# Patient Record
Sex: MALE | HISPANIC OR LATINO | Employment: UNEMPLOYED | URBAN - METROPOLITAN AREA
[De-identification: names, ages, dates, MRNs, and addresses within clinical notes are randomized per-mention and may not be internally consistent; named-entity substitution may affect disease eponyms.]

---

## 2019-03-11 ENCOUNTER — HOSPITAL ENCOUNTER (OUTPATIENT)
Dept: LAB | Facility: MEDICAL CENTER | Age: 54
End: 2019-03-11
Attending: NURSE PRACTITIONER
Payer: COMMERCIAL

## 2019-03-11 LAB
ALBUMIN SERPL BCP-MCNC: 4.6 G/DL (ref 3.2–4.9)
ALBUMIN/GLOB SERPL: 1.5 G/DL
ALP SERPL-CCNC: 116 U/L (ref 30–99)
ALT SERPL-CCNC: 36 U/L (ref 2–50)
ANION GAP SERPL CALC-SCNC: 7 MMOL/L (ref 0–11.9)
AST SERPL-CCNC: 26 U/L (ref 12–45)
BASOPHILS # BLD AUTO: 1 % (ref 0–1.8)
BASOPHILS # BLD: 0.1 K/UL (ref 0–0.12)
BILIRUB SERPL-MCNC: 0.5 MG/DL (ref 0.1–1.5)
BUN SERPL-MCNC: 15 MG/DL (ref 8–22)
CALCIUM SERPL-MCNC: 9.6 MG/DL (ref 8.5–10.5)
CHLORIDE SERPL-SCNC: 103 MMOL/L (ref 96–112)
CHOLEST SERPL-MCNC: 273 MG/DL (ref 100–199)
CO2 SERPL-SCNC: 28 MMOL/L (ref 20–33)
CREAT SERPL-MCNC: 1.16 MG/DL (ref 0.5–1.4)
EOSINOPHIL # BLD AUTO: 0.18 K/UL (ref 0–0.51)
EOSINOPHIL NFR BLD: 1.9 % (ref 0–6.9)
ERYTHROCYTE [DISTWIDTH] IN BLOOD BY AUTOMATED COUNT: 43.2 FL (ref 35.9–50)
GLOBULIN SER CALC-MCNC: 3.1 G/DL (ref 1.9–3.5)
GLUCOSE SERPL-MCNC: 132 MG/DL (ref 65–99)
HCT VFR BLD AUTO: 52.2 % (ref 42–52)
HDLC SERPL-MCNC: 40 MG/DL
HGB BLD-MCNC: 17.6 G/DL (ref 14–18)
IMM GRANULOCYTES # BLD AUTO: 0.03 K/UL (ref 0–0.11)
IMM GRANULOCYTES NFR BLD AUTO: 0.3 % (ref 0–0.9)
LDLC SERPL CALC-MCNC: 165 MG/DL
LYMPHOCYTES # BLD AUTO: 3.33 K/UL (ref 1–4.8)
LYMPHOCYTES NFR BLD: 34.7 % (ref 22–41)
MCH RBC QN AUTO: 30.3 PG (ref 27–33)
MCHC RBC AUTO-ENTMCNC: 33.7 G/DL (ref 33.7–35.3)
MCV RBC AUTO: 89.8 FL (ref 81.4–97.8)
MONOCYTES # BLD AUTO: 0.8 K/UL (ref 0–0.85)
MONOCYTES NFR BLD AUTO: 8.3 % (ref 0–13.4)
NEUTROPHILS # BLD AUTO: 5.17 K/UL (ref 1.82–7.42)
NEUTROPHILS NFR BLD: 53.8 % (ref 44–72)
NRBC # BLD AUTO: 0 K/UL
NRBC BLD-RTO: 0 /100 WBC
PLATELET # BLD AUTO: 263 K/UL (ref 164–446)
PMV BLD AUTO: 9.7 FL (ref 9–12.9)
POTASSIUM SERPL-SCNC: 3.7 MMOL/L (ref 3.6–5.5)
PROT SERPL-MCNC: 7.7 G/DL (ref 6–8.2)
RBC # BLD AUTO: 5.81 M/UL (ref 4.7–6.1)
SODIUM SERPL-SCNC: 138 MMOL/L (ref 135–145)
TRIGL SERPL-MCNC: 338 MG/DL (ref 0–149)
WBC # BLD AUTO: 9.6 K/UL (ref 4.8–10.8)

## 2019-03-11 PROCEDURE — 85025 COMPLETE CBC W/AUTO DIFF WBC: CPT

## 2019-03-11 PROCEDURE — 80053 COMPREHEN METABOLIC PANEL: CPT

## 2019-03-11 PROCEDURE — 80061 LIPID PANEL: CPT

## 2019-03-11 PROCEDURE — 36415 COLL VENOUS BLD VENIPUNCTURE: CPT

## 2019-06-24 ENCOUNTER — HOSPITAL ENCOUNTER (OUTPATIENT)
Dept: LAB | Facility: MEDICAL CENTER | Age: 54
End: 2019-06-24
Attending: NURSE PRACTITIONER
Payer: COMMERCIAL

## 2019-06-24 LAB
CHOLEST SERPL-MCNC: 128 MG/DL (ref 100–199)
EST. AVERAGE GLUCOSE BLD GHB EST-MCNC: 131 MG/DL
HBA1C MFR BLD: 6.2 % (ref 0–5.6)
HDLC SERPL-MCNC: 43 MG/DL
LDLC SERPL CALC-MCNC: 72 MG/DL
TRIGL SERPL-MCNC: 66 MG/DL (ref 0–149)

## 2019-06-24 PROCEDURE — 80061 LIPID PANEL: CPT

## 2019-06-24 PROCEDURE — 83036 HEMOGLOBIN GLYCOSYLATED A1C: CPT

## 2019-06-24 PROCEDURE — 84080 ASSAY ALKALINE PHOSPHATASES: CPT

## 2019-06-24 PROCEDURE — 84075 ASSAY ALKALINE PHOSPHATASE: CPT

## 2019-06-24 PROCEDURE — 36415 COLL VENOUS BLD VENIPUNCTURE: CPT

## 2019-06-26 LAB
ALP BONE SERPL-CCNC: 32 U/L (ref 0–55)
ALP ISOS SERPL HS-CCNC: 0 U/L
ALP LIVER SERPL-CCNC: 147 U/L (ref 0–94)
ALP SERPL-CCNC: 179 U/L (ref 40–120)

## 2019-09-23 ENCOUNTER — HOSPITAL ENCOUNTER (OUTPATIENT)
Dept: RADIOLOGY | Facility: MEDICAL CENTER | Age: 54
End: 2019-09-23
Attending: NURSE PRACTITIONER
Payer: COMMERCIAL

## 2019-09-23 ENCOUNTER — HOSPITAL ENCOUNTER (OUTPATIENT)
Dept: LAB | Facility: MEDICAL CENTER | Age: 54
End: 2019-09-23
Payer: COMMERCIAL

## 2019-09-23 DIAGNOSIS — R74.8 ABNORMAL LEVELS OF OTHER SERUM ENZYMES: ICD-10-CM

## 2019-09-23 LAB
ALBUMIN SERPL BCP-MCNC: 4.7 G/DL (ref 3.2–4.9)
ALP SERPL-CCNC: 103 U/L (ref 30–99)
ALT SERPL-CCNC: 32 U/L (ref 2–50)
AST SERPL-CCNC: 25 U/L (ref 12–45)
BILIRUB CONJ SERPL-MCNC: <0.1 MG/DL (ref 0.1–0.5)
BILIRUB INDIRECT SERPL-MCNC: ABNORMAL MG/DL (ref 0–1)
BILIRUB SERPL-MCNC: 0.6 MG/DL (ref 0.1–1.5)
CHOLEST SERPL-MCNC: 271 MG/DL (ref 100–199)
HDLC SERPL-MCNC: 57 MG/DL
LDLC SERPL CALC-MCNC: 188 MG/DL
PROT SERPL-MCNC: 7.6 G/DL (ref 6–8.2)
TRIGL SERPL-MCNC: 128 MG/DL (ref 0–149)

## 2019-09-23 PROCEDURE — 36415 COLL VENOUS BLD VENIPUNCTURE: CPT

## 2019-09-23 PROCEDURE — 83036 HEMOGLOBIN GLYCOSYLATED A1C: CPT

## 2019-09-23 PROCEDURE — 80076 HEPATIC FUNCTION PANEL: CPT

## 2019-09-23 PROCEDURE — 80061 LIPID PANEL: CPT

## 2019-09-23 PROCEDURE — 76705 ECHO EXAM OF ABDOMEN: CPT

## 2019-09-24 LAB
EST. AVERAGE GLUCOSE BLD GHB EST-MCNC: 117 MG/DL
HBA1C MFR BLD: 5.7 % (ref 0–5.6)

## 2019-12-24 ENCOUNTER — HOSPITAL ENCOUNTER (OUTPATIENT)
Dept: LAB | Facility: MEDICAL CENTER | Age: 54
End: 2019-12-24
Attending: NURSE PRACTITIONER
Payer: COMMERCIAL

## 2019-12-24 LAB
ALBUMIN SERPL BCP-MCNC: 4.7 G/DL (ref 3.2–4.9)
ALP SERPL-CCNC: 115 U/L (ref 30–99)
ALT SERPL-CCNC: 52 U/L (ref 2–50)
AST SERPL-CCNC: 36 U/L (ref 12–45)
BILIRUB CONJ SERPL-MCNC: <0.1 MG/DL (ref 0.1–0.5)
BILIRUB INDIRECT SERPL-MCNC: ABNORMAL MG/DL (ref 0–1)
BILIRUB SERPL-MCNC: 0.5 MG/DL (ref 0.1–1.5)
CHOLEST SERPL-MCNC: 139 MG/DL (ref 100–199)
FASTING STATUS PATIENT QL REPORTED: NORMAL
GGT SERPL-CCNC: 220 U/L (ref 7–51)
HDLC SERPL-MCNC: 55 MG/DL
LDLC SERPL CALC-MCNC: 64 MG/DL
PROT SERPL-MCNC: 7.7 G/DL (ref 6–8.2)
TRIGL SERPL-MCNC: 102 MG/DL (ref 0–149)

## 2019-12-24 PROCEDURE — 82977 ASSAY OF GGT: CPT

## 2019-12-24 PROCEDURE — 36415 COLL VENOUS BLD VENIPUNCTURE: CPT

## 2019-12-24 PROCEDURE — 80061 LIPID PANEL: CPT

## 2019-12-24 PROCEDURE — 83036 HEMOGLOBIN GLYCOSYLATED A1C: CPT

## 2019-12-24 PROCEDURE — 80076 HEPATIC FUNCTION PANEL: CPT

## 2019-12-25 LAB
EST. AVERAGE GLUCOSE BLD GHB EST-MCNC: 114 MG/DL
HBA1C MFR BLD: 5.6 % (ref 0–5.6)

## 2020-12-08 ENCOUNTER — HOSPITAL ENCOUNTER (INPATIENT)
Facility: MEDICAL CENTER | Age: 55
LOS: 32 days | DRG: 871 | End: 2021-01-09
Attending: EMERGENCY MEDICINE | Admitting: HOSPITALIST
Payer: COMMERCIAL

## 2020-12-08 ENCOUNTER — APPOINTMENT (OUTPATIENT)
Dept: RADIOLOGY | Facility: MEDICAL CENTER | Age: 55
DRG: 871 | End: 2020-12-08
Attending: EMERGENCY MEDICINE
Payer: COMMERCIAL

## 2020-12-08 DIAGNOSIS — J96.01 ACUTE HYPOXEMIC RESPIRATORY FAILURE (HCC): ICD-10-CM

## 2020-12-08 DIAGNOSIS — U07.1 COVID-19: ICD-10-CM

## 2020-12-08 PROBLEM — A41.9 SEPSIS (HCC): Status: ACTIVE | Noted: 2020-12-08

## 2020-12-08 PROBLEM — E87.1 HYPONATREMIA: Status: ACTIVE | Noted: 2020-12-08

## 2020-12-08 PROBLEM — R79.89 ABNORMAL LFTS: Status: ACTIVE | Noted: 2020-12-08

## 2020-12-08 PROBLEM — J12.82 PNEUMONIA DUE TO COVID-19 VIRUS: Status: ACTIVE | Noted: 2020-12-08

## 2020-12-08 PROBLEM — E87.20 LACTIC ACIDOSIS: Status: ACTIVE | Noted: 2020-12-08

## 2020-12-08 LAB
ALBUMIN SERPL BCP-MCNC: 3.7 G/DL (ref 3.2–4.9)
ALBUMIN/GLOB SERPL: 1.1 G/DL
ALP SERPL-CCNC: 189 U/L (ref 30–99)
ALT SERPL-CCNC: 91 U/L (ref 2–50)
ANION GAP SERPL CALC-SCNC: 12 MMOL/L (ref 7–16)
ANION GAP SERPL CALC-SCNC: 22 MMOL/L (ref 7–16)
APPEARANCE UR: CLEAR
AST SERPL-CCNC: 109 U/L (ref 12–45)
BACTERIA #/AREA URNS HPF: ABNORMAL /HPF
BASOPHILS # BLD AUTO: 0.1 % (ref 0–1.8)
BASOPHILS # BLD: 0.02 K/UL (ref 0–0.12)
BILIRUB SERPL-MCNC: 1.3 MG/DL (ref 0.1–1.5)
BILIRUB UR QL STRIP.AUTO: NEGATIVE
BUN SERPL-MCNC: 11 MG/DL (ref 8–22)
BUN SERPL-MCNC: 11 MG/DL (ref 8–22)
CALCIUM SERPL-MCNC: 8.3 MG/DL (ref 8.5–10.5)
CALCIUM SERPL-MCNC: 8.5 MG/DL (ref 8.5–10.5)
CHLORIDE SERPL-SCNC: 82 MMOL/L (ref 96–112)
CHLORIDE SERPL-SCNC: 91 MMOL/L (ref 96–112)
CO2 SERPL-SCNC: 17 MMOL/L (ref 20–33)
CO2 SERPL-SCNC: 21 MMOL/L (ref 20–33)
COLOR UR: YELLOW
COVID ORDER STATUS COVID19: NORMAL
CREAT SERPL-MCNC: 0.87 MG/DL (ref 0.5–1.4)
CREAT SERPL-MCNC: 1.21 MG/DL (ref 0.5–1.4)
CREAT UR-MCNC: 44.22 MG/DL
CRP SERPL HS-MCNC: 23.15 MG/DL (ref 0–0.75)
D DIMER PPP IA.FEU-MCNC: 1.51 UG/ML (FEU) (ref 0–0.5)
D DIMER PPP IA.FEU-MCNC: 1.91 UG/ML (FEU) (ref 0–0.5)
EKG IMPRESSION: NORMAL
EOSINOPHIL # BLD AUTO: 0 K/UL (ref 0–0.51)
EOSINOPHIL NFR BLD: 0 % (ref 0–6.9)
EPI CELLS #/AREA URNS HPF: NEGATIVE /HPF
ERYTHROCYTE [DISTWIDTH] IN BLOOD BY AUTOMATED COUNT: 40.3 FL (ref 35.9–50)
FLUAV RNA SPEC QL NAA+PROBE: NEGATIVE
FLUBV RNA SPEC QL NAA+PROBE: NEGATIVE
GLOBULIN SER CALC-MCNC: 3.4 G/DL (ref 1.9–3.5)
GLUCOSE SERPL-MCNC: 184 MG/DL (ref 65–99)
GLUCOSE SERPL-MCNC: 202 MG/DL (ref 65–99)
GLUCOSE UR STRIP.AUTO-MCNC: NEGATIVE MG/DL
HCT VFR BLD AUTO: 39.1 % (ref 42–52)
HGB BLD-MCNC: 13.5 G/DL (ref 14–18)
IMM GRANULOCYTES # BLD AUTO: 0.25 K/UL (ref 0–0.11)
IMM GRANULOCYTES NFR BLD AUTO: 1.1 % (ref 0–0.9)
INR PPP: 0.94 (ref 0.87–1.13)
KETONES UR STRIP.AUTO-MCNC: NEGATIVE MG/DL
LACTATE BLD-SCNC: 1.3 MMOL/L (ref 0.5–2)
LACTATE BLD-SCNC: 1.6 MMOL/L (ref 0.5–2)
LACTATE BLD-SCNC: 8.5 MMOL/L (ref 0.5–2)
LEUKOCYTE ESTERASE UR QL STRIP.AUTO: NEGATIVE
LYMPHOCYTES # BLD AUTO: 1.59 K/UL (ref 1–4.8)
LYMPHOCYTES NFR BLD: 7.1 % (ref 22–41)
MCH RBC QN AUTO: 29.8 PG (ref 27–33)
MCHC RBC AUTO-ENTMCNC: 34.5 G/DL (ref 33.7–35.3)
MCV RBC AUTO: 86.3 FL (ref 81.4–97.8)
MICRO URNS: ABNORMAL
MONOCYTES # BLD AUTO: 1.16 K/UL (ref 0–0.85)
MONOCYTES NFR BLD AUTO: 5.2 % (ref 0–13.4)
NEUTROPHILS # BLD AUTO: 19.22 K/UL (ref 1.82–7.42)
NEUTROPHILS NFR BLD: 86.5 % (ref 44–72)
NITRITE UR QL STRIP.AUTO: NEGATIVE
NRBC # BLD AUTO: 0 K/UL
NRBC BLD-RTO: 0 /100 WBC
OSMOLALITY SERPL: 266 MOSM/KG H2O (ref 278–298)
OSMOLALITY UR: 173 MOSM/KG H2O (ref 300–900)
PH UR STRIP.AUTO: 6 [PH] (ref 5–8)
PLATELET # BLD AUTO: 278 K/UL (ref 164–446)
PMV BLD AUTO: 9.2 FL (ref 9–12.9)
POTASSIUM SERPL-SCNC: 4.1 MMOL/L (ref 3.6–5.5)
POTASSIUM SERPL-SCNC: 4.2 MMOL/L (ref 3.6–5.5)
PROCALCITONIN SERPL-MCNC: 2.12 NG/ML
PROT SERPL-MCNC: 7.1 G/DL (ref 6–8.2)
PROT UR QL STRIP: NEGATIVE MG/DL
PROTHROMBIN TIME: 12.8 SEC (ref 12–14.6)
RBC # BLD AUTO: 4.53 M/UL (ref 4.7–6.1)
RBC # URNS HPF: ABNORMAL /HPF
RBC UR QL AUTO: ABNORMAL
RSV RNA SPEC QL NAA+PROBE: NEGATIVE
SARS-COV-2 RNA RESP QL NAA+PROBE: DETECTED
SODIUM SERPL-SCNC: 121 MMOL/L (ref 135–145)
SODIUM SERPL-SCNC: 124 MMOL/L (ref 135–145)
SODIUM UR-SCNC: <20 MMOL/L
SP GR UR STRIP.AUTO: 1.02
SPECIMEN SOURCE: ABNORMAL
UROBILINOGEN UR STRIP.AUTO-MCNC: 0.2 MG/DL
WBC # BLD AUTO: 22.2 K/UL (ref 4.8–10.8)
WBC #/AREA URNS HPF: ABNORMAL /HPF

## 2020-12-08 PROCEDURE — 80053 COMPREHEN METABOLIC PANEL: CPT

## 2020-12-08 PROCEDURE — 84300 ASSAY OF URINE SODIUM: CPT

## 2020-12-08 PROCEDURE — 71045 X-RAY EXAM CHEST 1 VIEW: CPT

## 2020-12-08 PROCEDURE — 94640 AIRWAY INHALATION TREATMENT: CPT

## 2020-12-08 PROCEDURE — 85610 PROTHROMBIN TIME: CPT

## 2020-12-08 PROCEDURE — 82570 ASSAY OF URINE CREATININE: CPT

## 2020-12-08 PROCEDURE — 83520 IMMUNOASSAY QUANT NOS NONAB: CPT

## 2020-12-08 PROCEDURE — 36415 COLL VENOUS BLD VENIPUNCTURE: CPT

## 2020-12-08 PROCEDURE — 0241U HCHG SARS-COV-2 COVID-19 NFCT DS RESP RNA 4 TRGT MIC: CPT

## 2020-12-08 PROCEDURE — 99223 1ST HOSP IP/OBS HIGH 75: CPT | Performed by: HOSPITALIST

## 2020-12-08 PROCEDURE — 81001 URINALYSIS AUTO W/SCOPE: CPT

## 2020-12-08 PROCEDURE — C9803 HOPD COVID-19 SPEC COLLECT: HCPCS | Performed by: EMERGENCY MEDICINE

## 2020-12-08 PROCEDURE — 84145 PROCALCITONIN (PCT): CPT

## 2020-12-08 PROCEDURE — 87040 BLOOD CULTURE FOR BACTERIA: CPT

## 2020-12-08 PROCEDURE — 99285 EMERGENCY DEPT VISIT HI MDM: CPT

## 2020-12-08 PROCEDURE — 96367 TX/PROPH/DG ADDL SEQ IV INF: CPT

## 2020-12-08 PROCEDURE — 85379 FIBRIN DEGRADATION QUANT: CPT

## 2020-12-08 PROCEDURE — 83605 ASSAY OF LACTIC ACID: CPT

## 2020-12-08 PROCEDURE — 93005 ELECTROCARDIOGRAM TRACING: CPT | Performed by: EMERGENCY MEDICINE

## 2020-12-08 PROCEDURE — 96375 TX/PRO/DX INJ NEW DRUG ADDON: CPT

## 2020-12-08 PROCEDURE — 96365 THER/PROPH/DIAG IV INF INIT: CPT

## 2020-12-08 PROCEDURE — 770020 HCHG ROOM/CARE - TELE (206)

## 2020-12-08 PROCEDURE — 80048 BASIC METABOLIC PNL TOTAL CA: CPT

## 2020-12-08 PROCEDURE — 700105 HCHG RX REV CODE 258: Performed by: EMERGENCY MEDICINE

## 2020-12-08 PROCEDURE — 700105 HCHG RX REV CODE 258: Performed by: HOSPITALIST

## 2020-12-08 PROCEDURE — 85025 COMPLETE CBC W/AUTO DIFF WBC: CPT

## 2020-12-08 PROCEDURE — 700111 HCHG RX REV CODE 636 W/ 250 OVERRIDE (IP): Performed by: EMERGENCY MEDICINE

## 2020-12-08 PROCEDURE — 83935 ASSAY OF URINE OSMOLALITY: CPT

## 2020-12-08 PROCEDURE — 83930 ASSAY OF BLOOD OSMOLALITY: CPT

## 2020-12-08 PROCEDURE — 86140 C-REACTIVE PROTEIN: CPT

## 2020-12-08 RX ORDER — DEXAMETHASONE 4 MG/1
6 TABLET ORAL DAILY
Status: DISCONTINUED | OUTPATIENT
Start: 2020-12-08 | End: 2020-12-08

## 2020-12-08 RX ORDER — DULOXETIN HYDROCHLORIDE 30 MG/1
30 CAPSULE, DELAYED RELEASE ORAL DAILY
Status: DISCONTINUED | OUTPATIENT
Start: 2020-12-09 | End: 2020-12-08

## 2020-12-08 RX ORDER — ALPRAZOLAM 0.25 MG/1
0.25 TABLET ORAL NIGHTLY PRN
Status: DISCONTINUED | OUTPATIENT
Start: 2020-12-08 | End: 2020-12-09

## 2020-12-08 RX ORDER — GABAPENTIN 100 MG/1
100 CAPSULE ORAL 3 TIMES DAILY
Status: DISCONTINUED | OUTPATIENT
Start: 2020-12-08 | End: 2020-12-08

## 2020-12-08 RX ORDER — SODIUM CHLORIDE 9 MG/ML
500 INJECTION, SOLUTION INTRAVENOUS ONCE
Status: COMPLETED | OUTPATIENT
Start: 2020-12-08 | End: 2020-12-08

## 2020-12-08 RX ORDER — TRAMADOL HYDROCHLORIDE 50 MG/1
50-100 TABLET ORAL EVERY 4 HOURS PRN
Status: DISCONTINUED | OUTPATIENT
Start: 2020-12-08 | End: 2020-12-10

## 2020-12-08 RX ORDER — ONDANSETRON 2 MG/ML
4 INJECTION INTRAMUSCULAR; INTRAVENOUS EVERY 6 HOURS PRN
Status: DISCONTINUED | OUTPATIENT
Start: 2020-12-08 | End: 2021-01-07

## 2020-12-08 RX ORDER — SODIUM CHLORIDE 9 MG/ML
1000 INJECTION, SOLUTION INTRAVENOUS ONCE
Status: DISCONTINUED | OUTPATIENT
Start: 2020-12-08 | End: 2020-12-08

## 2020-12-08 RX ORDER — DEXAMETHASONE 6 MG/1
6 TABLET ORAL DAILY
Status: COMPLETED | OUTPATIENT
Start: 2020-12-09 | End: 2020-12-17

## 2020-12-08 RX ORDER — AZITHROMYCIN 500 MG/5ML
500 INJECTION, POWDER, LYOPHILIZED, FOR SOLUTION INTRAVENOUS EVERY 24 HOURS
Status: DISCONTINUED | OUTPATIENT
Start: 2020-12-09 | End: 2020-12-08

## 2020-12-08 RX ORDER — ONDANSETRON 4 MG/1
4 TABLET, ORALLY DISINTEGRATING ORAL EVERY 6 HOURS PRN
Status: DISCONTINUED | OUTPATIENT
Start: 2020-12-08 | End: 2021-01-07

## 2020-12-08 RX ORDER — SODIUM CHLORIDE 9 MG/ML
INJECTION, SOLUTION INTRAVENOUS CONTINUOUS
Status: DISCONTINUED | OUTPATIENT
Start: 2020-12-08 | End: 2020-12-09

## 2020-12-08 RX ORDER — DEXAMETHASONE SODIUM PHOSPHATE 4 MG/ML
6 INJECTION, SOLUTION INTRA-ARTICULAR; INTRALESIONAL; INTRAMUSCULAR; INTRAVENOUS; SOFT TISSUE ONCE
Status: COMPLETED | OUTPATIENT
Start: 2020-12-08 | End: 2020-12-08

## 2020-12-08 RX ORDER — AZITHROMYCIN 250 MG/1
500 TABLET, FILM COATED ORAL DAILY
Status: COMPLETED | OUTPATIENT
Start: 2020-12-09 | End: 2020-12-10

## 2020-12-08 RX ORDER — ACETAMINOPHEN 325 MG/1
650 TABLET ORAL EVERY 6 HOURS PRN
Status: DISCONTINUED | OUTPATIENT
Start: 2020-12-08 | End: 2021-01-09 | Stop reason: HOSPADM

## 2020-12-08 RX ORDER — AZITHROMYCIN 500 MG/1
500 INJECTION, POWDER, LYOPHILIZED, FOR SOLUTION INTRAVENOUS ONCE
Status: COMPLETED | OUTPATIENT
Start: 2020-12-08 | End: 2020-12-08

## 2020-12-08 RX ADMIN — AZITHROMYCIN MONOHYDRATE 500 MG: 500 INJECTION, POWDER, LYOPHILIZED, FOR SOLUTION INTRAVENOUS at 16:51

## 2020-12-08 RX ADMIN — SODIUM CHLORIDE 3 G: 900 INJECTION INTRAVENOUS at 16:47

## 2020-12-08 RX ADMIN — DEXAMETHASONE SODIUM PHOSPHATE 6 MG: 4 INJECTION, SOLUTION INTRAMUSCULAR; INTRAVENOUS at 16:47

## 2020-12-08 RX ADMIN — SODIUM CHLORIDE: 9 INJECTION, SOLUTION INTRAVENOUS at 21:21

## 2020-12-08 RX ADMIN — SODIUM CHLORIDE 500 ML: 9 INJECTION, SOLUTION INTRAVENOUS at 17:03

## 2020-12-08 ASSESSMENT — COGNITIVE AND FUNCTIONAL STATUS - GENERAL
DRESSING REGULAR UPPER BODY CLOTHING: A LITTLE
WALKING IN HOSPITAL ROOM: A LITTLE
SUGGESTED CMS G CODE MODIFIER DAILY ACTIVITY: CJ
HELP NEEDED FOR BATHING: A LITTLE
DRESSING REGULAR LOWER BODY CLOTHING: A LITTLE
DAILY ACTIVITIY SCORE: 21
TURNING FROM BACK TO SIDE WHILE IN FLAT BAD: A LITTLE
MOVING TO AND FROM BED TO CHAIR: A LITTLE
MOBILITY SCORE: 18
MOVING FROM LYING ON BACK TO SITTING ON SIDE OF FLAT BED: A LITTLE
STANDING UP FROM CHAIR USING ARMS: A LITTLE
CLIMB 3 TO 5 STEPS WITH RAILING: A LITTLE
SUGGESTED CMS G CODE MODIFIER MOBILITY: CK

## 2020-12-08 ASSESSMENT — ENCOUNTER SYMPTOMS
FOCAL WEAKNESS: 0
VOMITING: 1
CHILLS: 1
FEVER: 1
DOUBLE VISION: 0
SHORTNESS OF BREATH: 1
HALLUCINATIONS: 0
DIZZINESS: 0
MYALGIAS: 1
SPEECH CHANGE: 0
SENSORY CHANGE: 0
PALPITATIONS: 0
HEMOPTYSIS: 0
ABDOMINAL PAIN: 0
DIARRHEA: 1
FLANK PAIN: 0
WEAKNESS: 0
COUGH: 1
EYE DISCHARGE: 0
NAUSEA: 0
BRUISES/BLEEDS EASILY: 0
DEPRESSION: 0
HEARTBURN: 0
BLURRED VISION: 0

## 2020-12-08 ASSESSMENT — PATIENT HEALTH QUESTIONNAIRE - PHQ9
2. FEELING DOWN, DEPRESSED, IRRITABLE, OR HOPELESS: NOT AT ALL
1. LITTLE INTEREST OR PLEASURE IN DOING THINGS: NOT AT ALL
SUM OF ALL RESPONSES TO PHQ9 QUESTIONS 1 AND 2: 0

## 2020-12-08 ASSESSMENT — FIBROSIS 4 INDEX
FIB4 SCORE: 1.01
FIB4 SCORE: 2.18

## 2020-12-08 ASSESSMENT — LIFESTYLE VARIABLES: SUBSTANCE_ABUSE: 0

## 2020-12-08 ASSESSMENT — PAIN DESCRIPTION - PAIN TYPE: TYPE: ACUTE PAIN

## 2020-12-08 NOTE — ED NOTES
Lab called with critical result of Lactic 0f 8.5 at 1555. Critical lab result read back to Lab.   Dr. Gonzalez notified of critical lab result at 1556.  Critical lab result read back by Dr. Gonzalez.

## 2020-12-08 NOTE — ED TRIAGE NOTES
Chief Complaint   Patient presents with   • Shortness of Breath     pt has diarrhea, vomiting, sob, cough the last two weeks.                       Pt is on 15L non rebreather, 85% now, was a walk into triage on 50% RA. Unable to communicate well, with non rebreather, anxious. Urdu speaking only. Coughing frequently.

## 2020-12-09 PROBLEM — E11.9 TYPE 2 DIABETES MELLITUS WITHOUT COMPLICATION (HCC): Status: ACTIVE | Noted: 2020-12-09

## 2020-12-09 LAB
ALBUMIN SERPL BCP-MCNC: 3.2 G/DL (ref 3.2–4.9)
ALBUMIN SERPL BCP-MCNC: 3.3 G/DL (ref 3.2–4.9)
ALBUMIN/GLOB SERPL: 1 G/DL
ALP SERPL-CCNC: 162 U/L (ref 30–99)
ALP SERPL-CCNC: 167 U/L (ref 30–99)
ALT SERPL-CCNC: 72 U/L (ref 2–50)
ALT SERPL-CCNC: 74 U/L (ref 2–50)
ANION GAP SERPL CALC-SCNC: 11 MMOL/L (ref 7–16)
ANION GAP SERPL CALC-SCNC: 8 MMOL/L (ref 7–16)
AST SERPL-CCNC: 64 U/L (ref 12–45)
AST SERPL-CCNC: 77 U/L (ref 12–45)
BASOPHILS # BLD AUTO: 0.1 % (ref 0–1.8)
BASOPHILS # BLD: 0.02 K/UL (ref 0–0.12)
BILIRUB CONJ SERPL-MCNC: 0.3 MG/DL (ref 0.1–0.5)
BILIRUB INDIRECT SERPL-MCNC: 0.3 MG/DL (ref 0–1)
BILIRUB SERPL-MCNC: 0.6 MG/DL (ref 0.1–1.5)
BILIRUB SERPL-MCNC: 0.7 MG/DL (ref 0.1–1.5)
BUN SERPL-MCNC: 12 MG/DL (ref 8–22)
BUN SERPL-MCNC: 13 MG/DL (ref 8–22)
CALCIUM SERPL-MCNC: 8.5 MG/DL (ref 8.5–10.5)
CALCIUM SERPL-MCNC: 8.5 MG/DL (ref 8.5–10.5)
CHLORIDE SERPL-SCNC: 94 MMOL/L (ref 96–112)
CHLORIDE SERPL-SCNC: 97 MMOL/L (ref 96–112)
CO2 SERPL-SCNC: 24 MMOL/L (ref 20–33)
CO2 SERPL-SCNC: 25 MMOL/L (ref 20–33)
CREAT SERPL-MCNC: 0.84 MG/DL (ref 0.5–1.4)
CREAT SERPL-MCNC: 0.86 MG/DL (ref 0.5–1.4)
EOSINOPHIL # BLD AUTO: 0 K/UL (ref 0–0.51)
EOSINOPHIL NFR BLD: 0 % (ref 0–6.9)
ERYTHROCYTE [DISTWIDTH] IN BLOOD BY AUTOMATED COUNT: 40.5 FL (ref 35.9–50)
EST. AVERAGE GLUCOSE BLD GHB EST-MCNC: 163 MG/DL
GLOBULIN SER CALC-MCNC: 3.1 G/DL (ref 1.9–3.5)
GLUCOSE BLD-MCNC: 184 MG/DL (ref 65–99)
GLUCOSE BLD-MCNC: 278 MG/DL (ref 65–99)
GLUCOSE SERPL-MCNC: 221 MG/DL (ref 65–99)
GLUCOSE SERPL-MCNC: 221 MG/DL (ref 65–99)
HBA1C MFR BLD: 7.3 % (ref 0–5.6)
HCT VFR BLD AUTO: 37.6 % (ref 42–52)
HGB BLD-MCNC: 13 G/DL (ref 14–18)
IMM GRANULOCYTES # BLD AUTO: 0.1 K/UL (ref 0–0.11)
IMM GRANULOCYTES NFR BLD AUTO: 0.7 % (ref 0–0.9)
LACTATE BLD-SCNC: 1.4 MMOL/L (ref 0.5–2)
LACTATE BLD-SCNC: 1.7 MMOL/L (ref 0.5–2)
LACTATE BLD-SCNC: 1.7 MMOL/L (ref 0.5–2)
LACTATE BLD-SCNC: 1.8 MMOL/L (ref 0.5–2)
LACTATE BLD-SCNC: 2.1 MMOL/L (ref 0.5–2)
LACTATE BLD-SCNC: 3.4 MMOL/L (ref 0.5–2)
LYMPHOCYTES # BLD AUTO: 0.7 K/UL (ref 1–4.8)
LYMPHOCYTES NFR BLD: 4.6 % (ref 22–41)
MCH RBC QN AUTO: 29.6 PG (ref 27–33)
MCHC RBC AUTO-ENTMCNC: 34.6 G/DL (ref 33.7–35.3)
MCV RBC AUTO: 85.6 FL (ref 81.4–97.8)
MONOCYTES # BLD AUTO: 0.77 K/UL (ref 0–0.85)
MONOCYTES NFR BLD AUTO: 5.1 % (ref 0–13.4)
NEUTROPHILS # BLD AUTO: 13.63 K/UL (ref 1.82–7.42)
NEUTROPHILS NFR BLD: 89.5 % (ref 44–72)
NRBC # BLD AUTO: 0 K/UL
NRBC BLD-RTO: 0 /100 WBC
PLATELET # BLD AUTO: 261 K/UL (ref 164–446)
PMV BLD AUTO: 9.2 FL (ref 9–12.9)
POTASSIUM SERPL-SCNC: 3.8 MMOL/L (ref 3.6–5.5)
POTASSIUM SERPL-SCNC: 4.1 MMOL/L (ref 3.6–5.5)
PROT SERPL-MCNC: 6.3 G/DL (ref 6–8.2)
PROT SERPL-MCNC: 6.6 G/DL (ref 6–8.2)
RBC # BLD AUTO: 4.39 M/UL (ref 4.7–6.1)
SODIUM SERPL-SCNC: 129 MMOL/L (ref 135–145)
SODIUM SERPL-SCNC: 130 MMOL/L (ref 135–145)
WBC # BLD AUTO: 15.2 K/UL (ref 4.8–10.8)

## 2020-12-09 PROCEDURE — 700102 HCHG RX REV CODE 250 W/ 637 OVERRIDE(OP): Performed by: HOSPITALIST

## 2020-12-09 PROCEDURE — 80048 BASIC METABOLIC PNL TOTAL CA: CPT

## 2020-12-09 PROCEDURE — 94640 AIRWAY INHALATION TREATMENT: CPT

## 2020-12-09 PROCEDURE — 700105 HCHG RX REV CODE 258: Performed by: HOSPITALIST

## 2020-12-09 PROCEDURE — 82962 GLUCOSE BLOOD TEST: CPT

## 2020-12-09 PROCEDURE — XW033E5 INTRODUCTION OF REMDESIVIR ANTI-INFECTIVE INTO PERIPHERAL VEIN, PERCUTANEOUS APPROACH, NEW TECHNOLOGY GROUP 5: ICD-10-PCS | Performed by: INTERNAL MEDICINE

## 2020-12-09 PROCEDURE — A9270 NON-COVERED ITEM OR SERVICE: HCPCS | Performed by: HOSPITALIST

## 2020-12-09 PROCEDURE — 99233 SBSQ HOSP IP/OBS HIGH 50: CPT | Performed by: HOSPITALIST

## 2020-12-09 PROCEDURE — 770020 HCHG ROOM/CARE - TELE (206)

## 2020-12-09 PROCEDURE — 83036 HEMOGLOBIN GLYCOSYLATED A1C: CPT

## 2020-12-09 PROCEDURE — 36415 COLL VENOUS BLD VENIPUNCTURE: CPT

## 2020-12-09 PROCEDURE — 85025 COMPLETE CBC W/AUTO DIFF WBC: CPT

## 2020-12-09 PROCEDURE — 94760 N-INVAS EAR/PLS OXIMETRY 1: CPT

## 2020-12-09 PROCEDURE — 80053 COMPREHEN METABOLIC PANEL: CPT

## 2020-12-09 PROCEDURE — 83605 ASSAY OF LACTIC ACID: CPT | Mod: 91

## 2020-12-09 PROCEDURE — 700111 HCHG RX REV CODE 636 W/ 250 OVERRIDE (IP): Performed by: HOSPITALIST

## 2020-12-09 PROCEDURE — 700105 HCHG RX REV CODE 258: Performed by: INTERNAL MEDICINE

## 2020-12-09 PROCEDURE — 700101 HCHG RX REV CODE 250: Performed by: INTERNAL MEDICINE

## 2020-12-09 PROCEDURE — 80076 HEPATIC FUNCTION PANEL: CPT

## 2020-12-09 RX ORDER — LORAZEPAM 0.5 MG/1
0.5 TABLET ORAL 3 TIMES DAILY PRN
Status: DISCONTINUED | OUTPATIENT
Start: 2020-12-09 | End: 2020-12-11

## 2020-12-09 RX ORDER — DEXTROSE MONOHYDRATE 25 G/50ML
50 INJECTION, SOLUTION INTRAVENOUS
Status: DISCONTINUED | OUTPATIENT
Start: 2020-12-09 | End: 2020-12-17

## 2020-12-09 RX ORDER — ALPRAZOLAM 0.5 MG/1
0.5 TABLET ORAL EVERY 6 HOURS PRN
Status: DISCONTINUED | OUTPATIENT
Start: 2020-12-09 | End: 2020-12-11

## 2020-12-09 RX ADMIN — INSULIN HUMAN 3 UNITS: 100 INJECTION, SOLUTION PARENTERAL at 18:22

## 2020-12-09 RX ADMIN — AMPICILLIN AND SULBACTAM 3 G: 2; 1 INJECTION, POWDER, FOR SOLUTION INTRAVENOUS at 00:21

## 2020-12-09 RX ADMIN — DEXAMETHASONE 6 MG: 4 TABLET ORAL at 05:11

## 2020-12-09 RX ADMIN — AMPICILLIN AND SULBACTAM 3 G: 2; 1 INJECTION, POWDER, FOR SOLUTION INTRAVENOUS at 18:09

## 2020-12-09 RX ADMIN — ACETAMINOPHEN 650 MG: 325 TABLET, FILM COATED ORAL at 14:16

## 2020-12-09 RX ADMIN — ALPRAZOLAM 0.25 MG: 0.25 TABLET ORAL at 20:22

## 2020-12-09 RX ADMIN — INSULIN HUMAN 1 UNITS: 100 INJECTION, SOLUTION PARENTERAL at 21:19

## 2020-12-09 RX ADMIN — LORAZEPAM 0.5 MG: 0.5 TABLET ORAL at 19:58

## 2020-12-09 RX ADMIN — AMPICILLIN AND SULBACTAM 3 G: 2; 1 INJECTION, POWDER, FOR SOLUTION INTRAVENOUS at 05:11

## 2020-12-09 RX ADMIN — REMDESIVIR 200 MG: 100 INJECTION, POWDER, LYOPHILIZED, FOR SOLUTION INTRAVENOUS at 10:13

## 2020-12-09 RX ADMIN — LORAZEPAM 0.5 MG: 0.5 TABLET ORAL at 14:16

## 2020-12-09 RX ADMIN — LORAZEPAM 0.5 MG: 0.5 TABLET ORAL at 08:23

## 2020-12-09 RX ADMIN — ENOXAPARIN SODIUM 40 MG: 40 INJECTION SUBCUTANEOUS at 05:11

## 2020-12-09 RX ADMIN — AMPICILLIN AND SULBACTAM 3 G: 2; 1 INJECTION, POWDER, FOR SOLUTION INTRAVENOUS at 12:27

## 2020-12-09 RX ADMIN — AZITHROMYCIN MONOHYDRATE 500 MG: 250 TABLET ORAL at 05:11

## 2020-12-09 ASSESSMENT — ENCOUNTER SYMPTOMS
PALPITATIONS: 1
HEMOPTYSIS: 0
DIZZINESS: 0
HEADACHES: 0
NECK PAIN: 0
ORTHOPNEA: 0
SPUTUM PRODUCTION: 0
HEARTBURN: 0
FEVER: 1
DOUBLE VISION: 0
DIAPHORESIS: 0
SORE THROAT: 0
COUGH: 1
NAUSEA: 0
CHILLS: 1
NERVOUS/ANXIOUS: 1
SHORTNESS OF BREATH: 1
DIARRHEA: 0
BLURRED VISION: 0
VOMITING: 0
MYALGIAS: 1
ABDOMINAL PAIN: 0

## 2020-12-09 NOTE — ED PROVIDER NOTES
ED Provider Note    CHIEF COMPLAINT  Chief Complaint   Patient presents with   • Shortness of Breath     pt has diarrhea, vomiting, sob, cough the last two weeks.        HPI  Singh Hall is a 55 y.o. male who presents with shortness of breath.  Patient started getting sick about 2 weeks ago.  He had vomiting and diarrhea initially.  Then developed generalized body aches and cough congestion.  Tested for Covid but did not get the results.  Shortness of breath is gradually worsened and is now severe.  Constant no modifying factors.  No leg swelling or leg pain.    REVIEW OF SYSTEMS  As per HPI, otherwise a 10 point review of systems is negative    PAST MEDICAL HISTORY  No past medical history on file.    SOCIAL HISTORY  Social History     Tobacco Use   • Smoking status: Never Smoker   Substance Use Topics   • Alcohol use: No   • Drug use: No       SURGICAL HISTORY  No past surgical history on file.    CURRENT MEDICATIONS  Home Medications    **Home medications have not yet been reviewed for this encounter**         ALLERGIES  No Known Allergies    PHYSICAL EXAM  VITAL SIGNS: /62   Pulse (!) 111   Temp 37.4 °C (99.4 °F) (Temporal)   Resp (!) 46   Ht 1.524 m (5')   Wt 63.5 kg (140 lb)   SpO2 94%   BMI 27.34 kg/m²    Constitutional: Awake and alert.  Obvious respiratory distress  HENT:  Atraumatic, Normocephalic.Oropharynx dry mucus membranes, Nose normal inspection.   Eyes: Normal inspection  Neck: Supple  Cardiovascular: Evaded heart rate, Normal rhythm.  Symmetric peripheral pulses.   Thorax & Lungs: Tachypneic and gasping for air.  Few scattered crackles.  No focal wheezing or rhonchi  Abdomen: Bowel sounds normal, soft, non-distended, nontender, no mass  Skin: Warm, Dry, No rash.   Back: No tenderness, No CVA tenderness.   Extremities: No asymmetric swelling  Neurologic: Grossly normal   Psychiatric: Anxious appearing    RADIOLOGY/PROCEDURES  DX-CHEST-PORTABLE (1 VIEW)   Final Result      Bilateral  peripheral pulmonary airspace process highly suspicious for Covid pneumonia           Imaging is interpreted by radiologist    Labs:  Results for orders placed or performed during the hospital encounter of 12/08/20   CBC WITH DIFFERENTIAL   Result Value Ref Range    WBC 22.2 (H) 4.8 - 10.8 K/uL    RBC 4.53 (L) 4.70 - 6.10 M/uL    Hemoglobin 13.5 (L) 14.0 - 18.0 g/dL    Hematocrit 39.1 (L) 42.0 - 52.0 %    MCV 86.3 81.4 - 97.8 fL    MCH 29.8 27.0 - 33.0 pg    MCHC 34.5 33.7 - 35.3 g/dL    RDW 40.3 35.9 - 50.0 fL    Platelet Count 278 164 - 446 K/uL    MPV 9.2 9.0 - 12.9 fL    Neutrophils-Polys 86.50 (H) 44.00 - 72.00 %    Lymphocytes 7.10 (L) 22.00 - 41.00 %    Monocytes 5.20 0.00 - 13.40 %    Eosinophils 0.00 0.00 - 6.90 %    Basophils 0.10 0.00 - 1.80 %    Immature Granulocytes 1.10 (H) 0.00 - 0.90 %    Nucleated RBC 0.00 /100 WBC    Neutrophils (Absolute) 19.22 (H) 1.82 - 7.42 K/uL    Lymphs (Absolute) 1.59 1.00 - 4.80 K/uL    Monos (Absolute) 1.16 (H) 0.00 - 0.85 K/uL    Eos (Absolute) 0.00 0.00 - 0.51 K/uL    Baso (Absolute) 0.02 0.00 - 0.12 K/uL    Immature Granulocytes (abs) 0.25 (H) 0.00 - 0.11 K/uL    NRBC (Absolute) 0.00 K/uL   COMP METABOLIC PANEL   Result Value Ref Range    Sodium 121 (L) 135 - 145 mmol/L    Potassium 4.2 3.6 - 5.5 mmol/L    Chloride 82 (L) 96 - 112 mmol/L    Co2 17 (L) 20 - 33 mmol/L    Anion Gap 22.0 (H) 7.0 - 16.0    Glucose 184 (H) 65 - 99 mg/dL    Bun 11 8 - 22 mg/dL    Creatinine 1.21 0.50 - 1.40 mg/dL    Calcium 8.5 8.5 - 10.5 mg/dL    AST(SGOT) 109 (H) 12 - 45 U/L    ALT(SGPT) 91 (H) 2 - 50 U/L    Alkaline Phosphatase 189 (H) 30 - 99 U/L    Total Bilirubin 1.3 0.1 - 1.5 mg/dL    Albumin 3.7 3.2 - 4.9 g/dL    Total Protein 7.1 6.0 - 8.2 g/dL    Globulin 3.4 1.9 - 3.5 g/dL    A-G Ratio 1.1 g/dL   LACTIC ACID   Result Value Ref Range    Lactic Acid 8.5 (HH) 0.5 - 2.0 mmol/L   COVID/SARS CoV-2 PCR    Specimen: Nasopharyngeal; Respirate   Result Value Ref Range    COVID Order Status  Received    D-Dimer   Result Value Ref Range    D-Dimer Screen 1.91 (H) 0.00 - 0.50 ug/mL (FEU)   CRP QUANTITIVE (NON-CARDIAC)   Result Value Ref Range    Stat C-Reactive Protein 23.15 (H) 0.00 - 0.75 mg/dL   CoV-2, Flu A/B, And RSV by PCR   Result Value Ref Range    SARS-CoV-2 Source NP Swab    ESTIMATED GFR   Result Value Ref Range    GFR If African American >60 >60 mL/min/1.73 m 2    GFR If Non African American >60 >60 mL/min/1.73 m 2   EKG   Result Value Ref Range    Report       [facility]    Test Date:  2020  Pt Name:    WARD EUBANKS                Department: ER  MRN:        [id number]                      Room:       [facility]  Gender:     Male                         Technician: [id number]  :        1965                   Requested By:ER TRIAGE PROTOCOL  Order #:    [id number]                    Reading MD: AMY RAJPUT MD    Measurements  Intervals                                Axis  Rate:       111                          P:          16  LA:         123                          QRS:        30  QRSD:       74                           T:          19  QT:         329  QTc:        447    Interpretive Statements  Sinus tachycardia  No previous ECG available for comparison  Electronically Signed On 2020 16:45:00 PST by AMY RAJPUT MD         Medications   ampicillin/sulbactam (UNASYN) 3 g in  mL IVPB (3 g Intravenous New Bag 20)   azithromycin (ZITHROMAX) injection 500 mg (has no administration in time range)   NS infusion 500 mL (has no administration in time range)   dexamethasone (DECADRON) injection 6 mg (6 mg Intravenous Given 20)       HYDRATION: Based on the patient's presentation of Tachycardia the patient was given IV fluids. IV Hydration was used because oral hydration was not as rapid as required. Upon recheck following hydration, the patient was Improved.    COURSE & MEDICAL DECISION MAKING  Patient presents to the ER  critically ill with hypoxic respiratory failure.  Given empiric antibiotics for pneumonia.  Suspect Covid and was given Decadron IV.  Still hypoxic on 15 L nonrebreather mask.  Transition to high flow nasal cannula.    Laboratory data returns as above.  X-ray demonstrates findings compatible with COVID-19.  Patient's respiratory status appears significantly improved although he still tachypneic on high flow.  Did give him 500 cc of saline because of suspected dehydration.  Will be judicious with IV fluids.  We will recheck his lactic acid.  He will need to be admitted to the hospital.    Glo intensivist to discuss.  I spoke with Dr. Greenberg.  Felice for admission to the hospitalist.  Advised placement of OxiMax over high flow if needed.  Continue steroids.  Use procalcitonin to determine antibiotics.  Glo hospitalist for admission.  Discussed with Dr. Hardin    FINAL IMPRESSION  1.  COVID-19 pneumonia  2.  Acute hypoxic respiratory failure  3.  Leukocytosis  4.  Elevated liver function tests  5.  Lactic acidosis    CRITICAL CARE TIME 30 minutes  There was a very real possibility of deterioration of the patient's condition.  This patient required the highest level of care.  I provided critical care services which included: review of the medical record, treatment orders, ordering and reviewing test results, frequent reevaluation of the patient's condition and response to treatment, as well as discussing the case with appropriate personnel and various consultants. The critical care time associated with the care of this patient is exclusive of any procedures or specific interventions.        This dictation was created using voice recognition software. The accuracy of the dictation is limited to the abilities of the software.  The nursing notes were reviewed and certain aspects of this information were incorporated into this note.      Electronically signed by: Anthony Gonzalez M.D., 12/8/2020 4:49 PM

## 2020-12-09 NOTE — CONSULTS
RCC    Asked to see the patient by emergency room physician re COVID PNA/RF  EHR reviewed  Patient seen and examined  Symptoms times several weeks, more systemic and GI symptoms than respiratory although now has cough and is very short of breath and hypoxic  Exam reveals patient to be cachectic w/bilateral rales  Abdomen benign  Chest x-ray consistent with Covid  Leukocytosis noted  Lactic acidosis secondary to hypoxemia  Improved with high flow nasal cannula, heart rate, respiratory rate and oxygenation status normalizing-both RN and ERP recognize significant improvement    Recommend  Add nonrebreather mask to HFNC for exertion  Repeat lactic acid and send blood cultures out of completeness  Hemodynamics acceptable, would not fluid bolus at this time, monitor  Decadron 6 mg IV or p.o. daily x10 days, start in ER  Self proning, start in the ER, patient encouraged  Run fluid neutral  Consider 5 day course C3/Azithro if Procal elevated or ROS consistent with CAP  If D-dimer > 3 consider NIVT to R/o DVT  Repeat LFTs tomorrow and if trending down consider remdesivir  If clinically deteriorates reconsult RCC    Reviewed with patient, RN and Dr. Gonzalez

## 2020-12-09 NOTE — ASSESSMENT & PLAN NOTE
Suspect due to covid 19   Monitoring CMP (last checked 12/13)  12/17 LFTs showing improvement.  Likely viral etiology

## 2020-12-09 NOTE — PROGRESS NOTES
Received Bedside report. Assumed care at 0700. This pt is AOx4, ambulatory with x1 assist, voiding adequately, reports no pain. Patient and RN discussed plan of care: questions answered. Labs noted, assessment complete, patient tolerating regualr diet. Tele box in place. Pt is on 60L 100% FiO2 of O2 via HFNC. Call light in place, fall precautions in place, patient educated on importance of calling for assistance.     Patient's respiration rate at 45, Dr. Wyatt updated at bedside. Non-rebreather placed for exertion. Patient refusing to prone, accepted to lay on his R side.

## 2020-12-09 NOTE — PROGRESS NOTES
Hospital Medicine Daily Progress Note    Date of Service  12/9/2020    Chief Complaint  55 y.o. male admitted 12/8/2020 with   Chief Complaint   Patient presents with   • Shortness of Breath     pt has diarrhea, vomiting, sob, cough the last two weeks.          Hospital Course  This 55-year-old male with a past medical history significant for chronic neuropathy presented to ER on 12/8/920 with a complaint of worsening shortness of breath.  He stated he had loose bowel movement, nausea, vomiting with generalized body ache.    (In ER he is found to be COVID-19 positive, noted to be in respiratory distress, placed on high flow.  Evaluated by intensivist Dr. Greenberg, need monitoring patient in telemetry.    To have lactic acidosis of 8.5 likely secondary to hypoxia versus hypovolemia, continue to trend.  He is also noted to have elevated procalcitonin at 2.12, WBC at 22.2; he was empirically treated with IV antibiotics for superimposed bacterial pneumonia in addition to Covid pneumonia.    Also noted to have elevated liver enzyme with AST/ALT 77/74 trending down to 66/72  He is noted to have Glyco at 7.3    Interval Problem Update  No acute events overnight, patient noted to be respiratory distress, did not want to do self proning, explained the benefit of self proning.   --Currently laying on the side, on high flow, continue.  ID consulted for remdesivir, will continue for total of 5 days.   --Continue Decadron 6 mg p.o. daily    Consultants/Specialty  Intensivist    Code Status  Full Code    Disposition  To be Determined    Review of Systems  Review of Systems   Constitutional: Positive for chills, fever and malaise/fatigue. Negative for diaphoresis.   HENT: Negative for congestion, hearing loss and sore throat.    Eyes: Negative for blurred vision and double vision.   Respiratory: Positive for cough and shortness of breath. Negative for hemoptysis and sputum production.    Cardiovascular: Positive for palpitations.  Negative for chest pain and orthopnea.   Gastrointestinal: Negative for abdominal pain, diarrhea, heartburn, nausea and vomiting.   Genitourinary: Negative for dysuria.   Musculoskeletal: Positive for myalgias. Negative for neck pain.   Neurological: Negative for dizziness and headaches.   Psychiatric/Behavioral: The patient is nervous/anxious.         Physical Exam  Temp:  [36.3 °C (97.4 °F)-37.4 °C (99.4 °F)] 36.9 °C (98.5 °F)  Pulse:  [] 88  Resp:  [16-48] 44  BP: ()/(58-90) 125/72  SpO2:  [85 %-97 %] 96 %    Physical Exam  Vitals signs and nursing note reviewed.   Constitutional:       Appearance: Normal appearance.   HENT:      Head: Normocephalic and atraumatic.   Eyes:      Extraocular Movements: Extraocular movements intact.   Neck:      Musculoskeletal: Neck supple.   Cardiovascular:      Rate and Rhythm: Normal rate.      Pulses: Normal pulses.      Heart sounds: No murmur.   Pulmonary:      Effort: Respiratory distress present.      Breath sounds: Rales present.      Comments:  Poor and shallow breath sounds  Abdominal:      General: Abdomen is flat. There is no distension.      Palpations: Abdomen is soft.   Musculoskeletal:      Right lower leg: No edema.      Left lower leg: No edema.   Skin:     General: Skin is warm.   Neurological:      Mental Status: He is alert and oriented to person, place, and time.      Cranial Nerves: No cranial nerve deficit.   Psychiatric:         Mood and Affect: Mood normal.         Fluids    Intake/Output Summary (Last 24 hours) at 12/9/2020 1407  Last data filed at 12/9/2020 0900  Gross per 24 hour   Intake 1100 ml   Output 2750 ml   Net -1650 ml       Laboratory  Recent Labs     12/08/20  1532 12/09/20  0127   WBC 22.2* 15.2*   RBC 4.53* 4.39*   HEMOGLOBIN 13.5* 13.0*   HEMATOCRIT 39.1* 37.6*   MCV 86.3 85.6   MCH 29.8 29.6   MCHC 34.5 34.6   RDW 40.3 40.5   PLATELETCT 278 261   MPV 9.2 9.2     Recent Labs     12/08/20  2107 12/09/20  0127 12/09/20  0519    SODIUM 124* 129* 130*   POTASSIUM 4.1 4.1 3.8   CHLORIDE 91* 94* 97   CO2 21 24 25   GLUCOSE 202* 221* 221*   BUN 11 12 13   CREATININE 0.87 0.86 0.84   CALCIUM 8.3* 8.5 8.5     Recent Labs     12/08/20  1532   INR 0.94               Imaging  DX-CHEST-PORTABLE (1 VIEW)   Final Result      Bilateral peripheral pulmonary airspace process highly suspicious for Covid pneumonia           Assessment/Plan  * Pneumonia due to COVID-19 virus  Assessment & Plan  Patient is on high flow, will continue Decadron 6 mg p.o. daily, continue remdesivir.   -- continue incentive spirometer, provide Zn and ascorbic acid   --Encourage proning    Patient does have superimposed bacterial pneumonia, will continue Unasyn plus azithromycin   -Dimer less than three, no DVT study indicated      Type 2 diabetes mellitus without complication (HCC)  Assessment & Plan  Glyco at 7.2, continue hypoglycemia protocol, accu-checks AC and HS    -- diabetic diet    Hyponatremia  Assessment & Plan  Hypovolemic hyponatremia, today sodium 130, repeat BMP at a.m.    Abnormal LFTs  Assessment & Plan  Suspect due to covid 19   Cont to trend    Sepsis (HCC)  Assessment & Plan  This is Severe Sepsis Present on admission  SIRS criteria identified on my evaluation include: Tachycardia, with heart rate greater than 90 BPM, Tachypnea, with respirations greater than 20 per minute and Leukocytosis, with WBC greater than 12,000  Source of infection is pulmonary  Clinical indicators of end organ dysfunction include Lactate >2 mmol/L (18.0 mg/dL)  Sepsis protocol initiated  Fluid resuscitation ordered per protocol  IV antibiotics as appropriate for source of sepsis  Reassessment: I have reassessed the patient's hemodynamic status  End organ dysfunction include(s):  Acute respiratory failure       Suspect likely pulmonary due to covid 19    -Empiric IV abx   -- Titarte O2 as needed    Lactic acidosis  Assessment & Plan  Severe at 8.5 suspect likely due to hypoxia and  mild hypovolemia    ---Trend and now resolved    Acute hypoxemic respiratory failure (HCC)  Assessment & Plan  Likely  secondary to Covid pneumonia superimposed to the patient with pneumonia   --- We will continue Decadron 6 mg p.o. daily, remdesivir   --- Encourage incentive spirometer, self proning, titrate oxygen as needed    Neuropathic pain- (present on admission)  Assessment & Plan  Chronic hx of, has been without medications for over a year   - continue gabapentin       VTE prophylaxis: Lovenox

## 2020-12-09 NOTE — PROGRESS NOTES
Notified Dr. Wyatt of patient's oral temp at 100.3 F and RR at 40. Orders to give ativan and tylenol.

## 2020-12-09 NOTE — ASSESSMENT & PLAN NOTE
This is Severe Sepsis Present on admission  SIRS criteria identified on my evaluation include: Tachycardia, with heart rate greater than 90 BPM, Tachypnea, with respirations greater than 20 per minute and Leukocytosis, with WBC greater than 12,000  Source of infection is pulmonary  Clinical indicators of end organ dysfunction include Lactate >2 mmol/L (18.0 mg/dL)  Sepsis protocol initiated  Fluid resuscitation ordered per protocol  IV antibiotics as appropriate for source of sepsis  Reassessment: I have reassessed the patient's hemodynamic status  End organ dysfunction include(s):  Acute respiratory failure

## 2020-12-09 NOTE — PROGRESS NOTES
ID evaluation for remdesivir  COVID + 12/8/2020  O2 60L now on 45L  Renal function acceptable  LFTs improved  CXR bilateral interstitial infiltrates, poor insp  Continue supportive care with oxygen supplementation, proning, judicious use of IV fluids  Monitor inflammatory markers every 48 hours as indicated  Prophylactic anticoagulation not recommended unless high risk   Agree with dexamethasone 6 mg PO daily   Start Remdesivir for 5 days IV  Please obtain daily CMP to evaluate kidney and liver function while on Remdesivir   Discontinue remdesivir if creatinine clearance less than 30 or significant increase in LFTs  Agree woth CAP treatment as elevated procalcitonin and cannot r/o concomitant pneumonia  Please call if we can be of further assistance

## 2020-12-09 NOTE — ASSESSMENT & PLAN NOTE
Secondary to COVID pneumonia.  Oxygen PRN; wean as tolerated.  Has completed course of Decadron and remdesivir.  Titrate oxygen as tolerated  Frequent pronation incentive spirometer  Up to chair for all meals

## 2020-12-09 NOTE — ED NOTES
Urine specimen sent.  Pt able to stand up to side of bed while on high flow NC and  desaturated to 89%.  He recovered once at rest.

## 2020-12-09 NOTE — ED NOTES
Med rec complete per pt's pharmacy- they haven't filled medications for pt since 4/2019. Pt reports taking a medications, but is unable to state what it's for and where it's been filled. NKDA.

## 2020-12-09 NOTE — ASSESSMENT & PLAN NOTE
Lab Results   Component Value Date/Time    HBA1C 7.3 (H) 12/09/2020 0828    HBA1C 5.6 12/24/2019 0810    HBA1C 5.7 (H) 09/23/2019 1036     Results from last 7 days   Lab Units 01/06/21  0513 01/05/21  2138 01/05/21  1751 01/05/21  1222 01/05/21  0540 01/04/21  2034 01/04/21  1730 01/04/21  1140   ACCU CHECK GLUCOSE 788 mg/dL 115* 151* 151* 159* 129* 148* 165* 130*     I have ordered insulin sliding scale with D50 and glucagon for hypoglycemia per protocol.  Diabetic diet  Diabetic education

## 2020-12-09 NOTE — H&P
Hospital Medicine History & Physical Note    Date of Service  12/8/2020    Primary Care Physician  ROMAN Hassan    Consultants  ID    Code Status  Full Code    Chief Complaint  Chief Complaint   Patient presents with   • Shortness of Breath     pt has diarrhea, vomiting, sob, cough the last two weeks.        History of Presenting Illness  55 y.o. male who presented 12/8/2020 with past medical history of chronic neuropathy who presents to the hospital for shortness of breath.  This patient states that his symptoms started about 9 days ago.  He initially started with symptoms of diarrhea and vomiting.  Also with some generalized body aches.  Subsequently started developing shortness of breath with cough congestion and sputum production.  Otherwise no known alleviating or exacerbating factors to his symptoms.  He did get tested for COVID-19 however did not follow-up the test results.  Shortness of breath has gradually worsened over the last several days and now is severe.      In the emergency department is found to be in respiratory distress and placed on high flow nasal cannula with improvement of his symptoms.  Patient was evaluated by ICU did not meet ICU criteria.  Will be admitted to the hospital service for further management of his symptoms.    Review of Systems  Review of Systems   Constitutional: Positive for chills, fever and malaise/fatigue.   HENT: Negative for congestion, hearing loss and tinnitus.    Eyes: Negative for blurred vision, double vision and discharge.   Respiratory: Positive for cough and shortness of breath. Negative for hemoptysis.    Cardiovascular: Negative for chest pain, palpitations and leg swelling.   Gastrointestinal: Positive for diarrhea and vomiting. Negative for abdominal pain, heartburn and nausea.   Genitourinary: Negative for dysuria and flank pain.   Musculoskeletal: Positive for myalgias. Negative for joint pain.   Skin: Negative for rash.   Neurological:  Negative for dizziness, sensory change, speech change, focal weakness and weakness.   Endo/Heme/Allergies: Negative for environmental allergies. Does not bruise/bleed easily.   Psychiatric/Behavioral: Negative for depression, hallucinations and substance abuse.       Past Medical History  Neuropathy      Surgical History  Reviewed and not pertinent    Family History  Reviewed and not pertinent    Social History   reports that he has never smoked. He does not have any smokeless tobacco history on file. He reports that he does not drink alcohol or use drugs.    Allergies  No Known Allergies    Medications  Prior to Admission Medications   Prescriptions Last Dose Informant Patient Reported? Taking?   B Complex Vitamins (VITAMIN-B COMPLEX PO)   Yes No   Sig: Take 1 Tab by mouth every day.   DULoxetine HCl (CYMBALTA PO)   Yes No   Sig: Take  by mouth.   MELATONIN   Yes No   Sig: by Does not apply route.   MIRTAZAPINE PO   Yes No   Sig: Take  by mouth.   alprazolam (XANAX) 0.25 MG TABS   Yes No   Sig: Take 0.25 mg by mouth at bedtime as needed.   gabapentin (NEURONTIN) 100 MG CAPS   No No   Sig: Take 1 Cap by mouth 3 times a day.   guaifenesin DM sugar free (DIABETIC TUSSIN DM)  MG/5ML LIQD   Yes No   Sig: Take 10 mL by mouth every four hours as needed.   naproxen (ALEVE) 220 MG tablet   Yes No   Sig: Take 220 mg by mouth 2 times a day, with meals.   tramadol (ULTRAM) 50 MG TABS   No No   Sig: Take 1-2 Tabs by mouth every four hours as needed for Mild Pain.      Facility-Administered Medications: None       Physical Exam  Temp:  [37.4 °C (99.4 °F)] 37.4 °C (99.4 °F)  Pulse:  [111-118] 111  Resp:  [30-48] 38  BP: (108-140)/(62-75) 108/67  SpO2:  [85 %-97 %] 97 %    Physical Exam  Vitals signs reviewed.   Constitutional:       General: He is not in acute distress.     Appearance: He is ill-appearing.   HENT:      Head: Normocephalic and atraumatic.      Nose: No congestion.      Mouth/Throat:      Mouth: Mucous  membranes are dry.   Eyes:      Extraocular Movements: Extraocular movements intact.      Pupils: Pupils are equal, round, and reactive to light.   Neck:      Musculoskeletal: Neck supple.   Cardiovascular:      Rate and Rhythm: Regular rhythm. Tachycardia present.      Pulses: Normal pulses.      Heart sounds: Normal heart sounds.   Pulmonary:      Effort: Respiratory distress present.      Breath sounds: Wheezing and rhonchi present.   Abdominal:      General: Bowel sounds are normal. There is no distension.      Palpations: Abdomen is soft.      Tenderness: There is no abdominal tenderness.   Musculoskeletal:         General: No swelling.   Skin:     General: Skin is warm and dry.      Capillary Refill: Capillary refill takes 2 to 3 seconds.   Neurological:      General: No focal deficit present.      Mental Status: He is alert and oriented to person, place, and time. Mental status is at baseline.   Psychiatric:         Mood and Affect: Mood normal.         Behavior: Behavior normal.         Thought Content: Thought content normal.         Judgment: Judgment normal.         Laboratory:  Recent Labs     12/08/20  1532   WBC 22.2*   RBC 4.53*   HEMOGLOBIN 13.5*   HEMATOCRIT 39.1*   MCV 86.3   MCH 29.8   MCHC 34.5   RDW 40.3   PLATELETCT 278   MPV 9.2     Recent Labs     12/08/20  1532   SODIUM 121*   POTASSIUM 4.2   CHLORIDE 82*   CO2 17*   GLUCOSE 184*   BUN 11   CREATININE 1.21   CALCIUM 8.5     Recent Labs     12/08/20  1532   ALTSGPT 91*   ASTSGOT 109*   ALKPHOSPHAT 189*   TBILIRUBIN 1.3   GLUCOSE 184*         No results for input(s): NTPROBNP in the last 72 hours.      No results for input(s): TROPONINT in the last 72 hours.    Imaging:  DX-CHEST-PORTABLE (1 VIEW)   Final Result      Bilateral peripheral pulmonary airspace process highly suspicious for Covid pneumonia            Assessment/Plan:  I anticipate this patient will require at least two midnights for appropriate medical management, necessitating  inpatient admission.    * Pneumonia due to COVID-19 virus  Assessment & Plan  Admit to telemetry on high flow nasal cannula   Attempt to keep patient net negative to reduce risk for ards  Start on decadron 6 mg daily x 10 days   ID consulted for RDV and potential CP with Dr. Henry   Encourage IS, self prone position  Trend labs as appropriate  lovenox for DVT prophylaxis   If dimer >3 check r/o dvt    Hyponatremia  Assessment & Plan  Moderate and appears hypovolemic   Check urine and serum osmo   Urine lytes  Cont with ns at 50 cc/hr   Recheck labs    Abnormal LFTs  Assessment & Plan  Suspect due to covid 19   Cont to trend    Sepsis (HCC)  Assessment & Plan  This is Severe Sepsis Present on admission  SIRS criteria identified on my evaluation include: Tachycardia, with heart rate greater than 90 BPM, Tachypnea, with respirations greater than 20 per minute and Leukocytosis, with WBC greater than 12,000  Source of infection is pulmonary  Clinical indicators of end organ dysfunction include Lactate >2 mmol/L (18.0 mg/dL)  Sepsis protocol initiated  Fluid resuscitation ordered per protocol  IV antibiotics as appropriate for source of sepsis  Reassessment: I have reassessed the patient's hemodynamic status  End organ dysfunction include(s):  Acute respiratory failure       Suspect likely pulmonary due to covid 19   Empiric IV abx with rocephin and azithro for now   Check procal   Trend lactic   Cultures   descilate therapy as appropriate      Lactic acidosis  Assessment & Plan  Severe at 8.5 suspect likely due to hypoxia and mild hypovolemia   Cont with mild IVF 50 cc/hr, will need to reassess in am as patient with covid 19 high risk for ards development  Trend lactic     Acute hypoxemic respiratory failure (HCC)  Assessment & Plan  Due to covid 19   Wean high flow as tolerated   Encourage IS and self prone   Consider transfer to ICU for intubation as patient clinically high risk     Neuropathic pain- (present on  admission)  Assessment & Plan  Chronic hx of, has been without medications for over a year

## 2020-12-09 NOTE — PROGRESS NOTES
Pt is A&Ox4 and on 60 L of high flow O2 at 90%. No reports of pain. VS stable. Tele monitor in place.    Pt is resting in bed. Call light is in reach and bed is locked in its lowest position. Hourly rounding in place. Assessment complete and all needs are attended to.

## 2020-12-10 ENCOUNTER — APPOINTMENT (OUTPATIENT)
Dept: RADIOLOGY | Facility: MEDICAL CENTER | Age: 55
DRG: 871 | End: 2020-12-10
Attending: STUDENT IN AN ORGANIZED HEALTH CARE EDUCATION/TRAINING PROGRAM
Payer: COMMERCIAL

## 2020-12-10 ENCOUNTER — APPOINTMENT (OUTPATIENT)
Dept: CARDIOLOGY | Facility: MEDICAL CENTER | Age: 55
DRG: 871 | End: 2020-12-10
Attending: HOSPITALIST
Payer: COMMERCIAL

## 2020-12-10 PROBLEM — D72.829 LEUKOCYTOSIS: Status: ACTIVE | Noted: 2020-12-10

## 2020-12-10 PROBLEM — R45.1 AGITATION: Status: ACTIVE | Noted: 2020-12-10

## 2020-12-10 LAB
ALBUMIN SERPL BCP-MCNC: 3 G/DL (ref 3.2–4.9)
ALBUMIN/GLOB SERPL: 1 G/DL
ALP SERPL-CCNC: 167 U/L (ref 30–99)
ALT SERPL-CCNC: 66 U/L (ref 2–50)
ANION GAP SERPL CALC-SCNC: 11 MMOL/L (ref 7–16)
AST SERPL-CCNC: 62 U/L (ref 12–45)
BASE EXCESS BLDA CALC-SCNC: 1 MMOL/L (ref -4–3)
BASOPHILS # BLD AUTO: 0.1 % (ref 0–1.8)
BASOPHILS # BLD: 0.02 K/UL (ref 0–0.12)
BILIRUB SERPL-MCNC: 0.6 MG/DL (ref 0.1–1.5)
BODY TEMPERATURE: ABNORMAL CENTIGRADE
BUN SERPL-MCNC: 15 MG/DL (ref 8–22)
CALCIUM SERPL-MCNC: 8.3 MG/DL (ref 8.5–10.5)
CHLORIDE SERPL-SCNC: 98 MMOL/L (ref 96–112)
CO2 SERPL-SCNC: 25 MMOL/L (ref 20–33)
CREAT SERPL-MCNC: 0.86 MG/DL (ref 0.5–1.4)
EKG IMPRESSION: NORMAL
EOSINOPHIL # BLD AUTO: 0 K/UL (ref 0–0.51)
EOSINOPHIL NFR BLD: 0 % (ref 0–6.9)
ERYTHROCYTE [DISTWIDTH] IN BLOOD BY AUTOMATED COUNT: 44 FL (ref 35.9–50)
GLOBULIN SER CALC-MCNC: 3.1 G/DL (ref 1.9–3.5)
GLUCOSE BLD-MCNC: 173 MG/DL (ref 65–99)
GLUCOSE BLD-MCNC: 185 MG/DL (ref 65–99)
GLUCOSE BLD-MCNC: 260 MG/DL (ref 65–99)
GLUCOSE SERPL-MCNC: 166 MG/DL (ref 65–99)
HCO3 BLDA-SCNC: 23 MMOL/L (ref 17–25)
HCT VFR BLD AUTO: 35.7 % (ref 42–52)
HGB BLD-MCNC: 12.2 G/DL (ref 14–18)
IL6 SERPL-MCNC: 55.4 PG/ML
IMM GRANULOCYTES # BLD AUTO: 0.18 K/UL (ref 0–0.11)
IMM GRANULOCYTES NFR BLD AUTO: 0.9 % (ref 0–0.9)
LYMPHOCYTES # BLD AUTO: 0.75 K/UL (ref 1–4.8)
LYMPHOCYTES NFR BLD: 3.7 % (ref 22–41)
MAGNESIUM SERPL-MCNC: 2.1 MG/DL (ref 1.5–2.5)
MCH RBC QN AUTO: 30 PG (ref 27–33)
MCHC RBC AUTO-ENTMCNC: 34.2 G/DL (ref 33.7–35.3)
MCV RBC AUTO: 87.9 FL (ref 81.4–97.8)
MONOCYTES # BLD AUTO: 1.18 K/UL (ref 0–0.85)
MONOCYTES NFR BLD AUTO: 5.9 % (ref 0–13.4)
NEUTROPHILS # BLD AUTO: 17.91 K/UL (ref 1.82–7.42)
NEUTROPHILS NFR BLD: 89.4 % (ref 44–72)
NRBC # BLD AUTO: 0 K/UL
NRBC BLD-RTO: 0 /100 WBC
O2/TOTAL GAS SETTING VFR VENT: 100 % (ref 30–60)
PCO2 BLDA: 30.2 MMHG (ref 26–37)
PH BLDA: 7.5 [PH] (ref 7.4–7.5)
PLATELET # BLD AUTO: 336 K/UL (ref 164–446)
PMV BLD AUTO: 8.7 FL (ref 9–12.9)
PO2 BLDA: 56.4 MMHG (ref 64–87)
POTASSIUM SERPL-SCNC: 3.5 MMOL/L (ref 3.6–5.5)
PROCALCITONIN SERPL-MCNC: 5.77 NG/ML
PROT SERPL-MCNC: 6.1 G/DL (ref 6–8.2)
RBC # BLD AUTO: 4.06 M/UL (ref 4.7–6.1)
SAO2 % BLDA: 89.9 % (ref 93–99)
SCCMEC + MECA PNL NOSE NAA+PROBE: NEGATIVE
SCCMEC + MECA PNL NOSE NAA+PROBE: NEGATIVE
SODIUM SERPL-SCNC: 134 MMOL/L (ref 135–145)
TROPONIN T SERPL-MCNC: 23 NG/L (ref 6–19)
WBC # BLD AUTO: 20 K/UL (ref 4.8–10.8)

## 2020-12-10 PROCEDURE — 82962 GLUCOSE BLOOD TEST: CPT

## 2020-12-10 PROCEDURE — 700111 HCHG RX REV CODE 636 W/ 250 OVERRIDE (IP): Performed by: STUDENT IN AN ORGANIZED HEALTH CARE EDUCATION/TRAINING PROGRAM

## 2020-12-10 PROCEDURE — 93005 ELECTROCARDIOGRAM TRACING: CPT | Performed by: HOSPITALIST

## 2020-12-10 PROCEDURE — A9270 NON-COVERED ITEM OR SERVICE: HCPCS | Performed by: HOSPITALIST

## 2020-12-10 PROCEDURE — 87641 MR-STAPH DNA AMP PROBE: CPT

## 2020-12-10 PROCEDURE — 94640 AIRWAY INHALATION TREATMENT: CPT

## 2020-12-10 PROCEDURE — 700111 HCHG RX REV CODE 636 W/ 250 OVERRIDE (IP): Performed by: HOSPITALIST

## 2020-12-10 PROCEDURE — 93010 ELECTROCARDIOGRAM REPORT: CPT | Performed by: INTERNAL MEDICINE

## 2020-12-10 PROCEDURE — 85025 COMPLETE CBC W/AUTO DIFF WBC: CPT

## 2020-12-10 PROCEDURE — 84145 PROCALCITONIN (PCT): CPT

## 2020-12-10 PROCEDURE — 87640 STAPH A DNA AMP PROBE: CPT

## 2020-12-10 PROCEDURE — 83735 ASSAY OF MAGNESIUM: CPT

## 2020-12-10 PROCEDURE — 770020 HCHG ROOM/CARE - TELE (206)

## 2020-12-10 PROCEDURE — 80053 COMPREHEN METABOLIC PANEL: CPT

## 2020-12-10 PROCEDURE — 71045 X-RAY EXAM CHEST 1 VIEW: CPT

## 2020-12-10 PROCEDURE — 700105 HCHG RX REV CODE 258: Performed by: INTERNAL MEDICINE

## 2020-12-10 PROCEDURE — 700101 HCHG RX REV CODE 250: Performed by: INTERNAL MEDICINE

## 2020-12-10 PROCEDURE — 94760 N-INVAS EAR/PLS OXIMETRY 1: CPT

## 2020-12-10 PROCEDURE — 84484 ASSAY OF TROPONIN QUANT: CPT

## 2020-12-10 PROCEDURE — 700102 HCHG RX REV CODE 250 W/ 637 OVERRIDE(OP): Performed by: HOSPITALIST

## 2020-12-10 PROCEDURE — 99233 SBSQ HOSP IP/OBS HIGH 50: CPT | Performed by: HOSPITALIST

## 2020-12-10 PROCEDURE — 700105 HCHG RX REV CODE 258: Performed by: HOSPITALIST

## 2020-12-10 PROCEDURE — 82803 BLOOD GASES ANY COMBINATION: CPT

## 2020-12-10 RX ORDER — GABAPENTIN 400 MG/1
400 CAPSULE ORAL 2 TIMES DAILY
Status: DISCONTINUED | OUTPATIENT
Start: 2020-12-10 | End: 2021-01-09 | Stop reason: HOSPADM

## 2020-12-10 RX ORDER — OXYCODONE HYDROCHLORIDE 5 MG/1
5 TABLET ORAL EVERY 4 HOURS PRN
Status: DISCONTINUED | OUTPATIENT
Start: 2020-12-10 | End: 2021-01-07

## 2020-12-10 RX ORDER — ESCITALOPRAM OXALATE 10 MG/1
10 TABLET ORAL
Status: DISCONTINUED | OUTPATIENT
Start: 2020-12-10 | End: 2021-01-09 | Stop reason: HOSPADM

## 2020-12-10 RX ORDER — HALOPERIDOL 5 MG/ML
2-5 INJECTION INTRAMUSCULAR EVERY 4 HOURS PRN
Status: DISCONTINUED | OUTPATIENT
Start: 2020-12-10 | End: 2021-01-02

## 2020-12-10 RX ORDER — LINEZOLID 600 MG/1
600 TABLET, FILM COATED ORAL EVERY 12 HOURS
Status: DISCONTINUED | OUTPATIENT
Start: 2020-12-10 | End: 2020-12-11

## 2020-12-10 RX ORDER — OLANZAPINE 5 MG/1
5 TABLET ORAL EVERY EVENING
Status: DISCONTINUED | OUTPATIENT
Start: 2020-12-10 | End: 2020-12-11

## 2020-12-10 RX ORDER — QUETIAPINE FUMARATE 25 MG/1
50 TABLET, FILM COATED ORAL 2 TIMES DAILY
Status: DISCONTINUED | OUTPATIENT
Start: 2020-12-10 | End: 2020-12-10

## 2020-12-10 RX ORDER — FUROSEMIDE 10 MG/ML
20 INJECTION INTRAMUSCULAR; INTRAVENOUS
Status: DISCONTINUED | OUTPATIENT
Start: 2020-12-10 | End: 2020-12-22

## 2020-12-10 RX ADMIN — ALPRAZOLAM 0.5 MG: 0.5 TABLET ORAL at 00:23

## 2020-12-10 RX ADMIN — HALOPERIDOL LACTATE 2 MG: 5 INJECTION, SOLUTION INTRAMUSCULAR at 05:22

## 2020-12-10 RX ADMIN — GABAPENTIN 400 MG: 400 CAPSULE ORAL at 17:09

## 2020-12-10 RX ADMIN — DEXAMETHASONE 6 MG: 4 TABLET ORAL at 06:12

## 2020-12-10 RX ADMIN — AMPICILLIN AND SULBACTAM 3 G: 2; 1 INJECTION, POWDER, FOR SOLUTION INTRAVENOUS at 06:12

## 2020-12-10 RX ADMIN — AMPICILLIN AND SULBACTAM 3 G: 2; 1 INJECTION, POWDER, FOR SOLUTION INTRAVENOUS at 12:00

## 2020-12-10 RX ADMIN — AZITHROMYCIN MONOHYDRATE 500 MG: 250 TABLET ORAL at 06:12

## 2020-12-10 RX ADMIN — HALOPERIDOL LACTATE 5 MG: 5 INJECTION, SOLUTION INTRAMUSCULAR at 16:21

## 2020-12-10 RX ADMIN — REMDESIVIR 100 MG: 100 INJECTION, POWDER, LYOPHILIZED, FOR SOLUTION INTRAVENOUS at 18:42

## 2020-12-10 RX ADMIN — FUROSEMIDE 20 MG: 10 INJECTION, SOLUTION INTRAMUSCULAR; INTRAVENOUS at 17:23

## 2020-12-10 RX ADMIN — FUROSEMIDE 20 MG: 10 INJECTION, SOLUTION INTRAMUSCULAR; INTRAVENOUS at 09:57

## 2020-12-10 RX ADMIN — QUETIAPINE FUMARATE 50 MG: 25 TABLET ORAL at 03:57

## 2020-12-10 RX ADMIN — AMPICILLIN AND SULBACTAM 3 G: 2; 1 INJECTION, POWDER, FOR SOLUTION INTRAVENOUS at 17:23

## 2020-12-10 RX ADMIN — AMPICILLIN AND SULBACTAM 3 G: 2; 1 INJECTION, POWDER, FOR SOLUTION INTRAVENOUS at 00:19

## 2020-12-10 RX ADMIN — INSULIN HUMAN 3 UNITS: 100 INJECTION, SOLUTION PARENTERAL at 17:23

## 2020-12-10 RX ADMIN — ALPRAZOLAM 0.5 MG: 0.5 TABLET ORAL at 23:56

## 2020-12-10 RX ADMIN — ENOXAPARIN SODIUM 40 MG: 40 INJECTION SUBCUTANEOUS at 06:12

## 2020-12-10 RX ADMIN — LINEZOLID 600 MG: 600 TABLET, FILM COATED ORAL at 09:57

## 2020-12-10 RX ADMIN — INSULIN HUMAN 1 UNITS: 100 INJECTION, SOLUTION PARENTERAL at 20:58

## 2020-12-10 RX ADMIN — AMPICILLIN AND SULBACTAM 3 G: 2; 1 INJECTION, POWDER, FOR SOLUTION INTRAVENOUS at 23:30

## 2020-12-10 RX ADMIN — LORAZEPAM 0.5 MG: 0.5 TABLET ORAL at 14:00

## 2020-12-10 RX ADMIN — OLANZAPINE 5 MG: 5 TABLET, FILM COATED ORAL at 17:09

## 2020-12-10 RX ADMIN — LINEZOLID 600 MG: 600 TABLET, FILM COATED ORAL at 17:09

## 2020-12-10 RX ADMIN — INSULIN HUMAN 1 UNITS: 100 INJECTION, SOLUTION PARENTERAL at 07:40

## 2020-12-10 RX ADMIN — HALOPERIDOL LACTATE 5 MG: 5 INJECTION, SOLUTION INTRAMUSCULAR at 05:46

## 2020-12-10 RX ADMIN — ESCITALOPRAM OXALATE 10 MG: 10 TABLET ORAL at 17:09

## 2020-12-10 ASSESSMENT — ENCOUNTER SYMPTOMS
NAUSEA: 0
SHORTNESS OF BREATH: 1
PALPITATIONS: 1
ORTHOPNEA: 0
DIZZINESS: 0
HEARTBURN: 0
MYALGIAS: 1
SORE THROAT: 0
ABDOMINAL PAIN: 0
BLURRED VISION: 0
DIAPHORESIS: 0
COUGH: 1
FEVER: 1
SPUTUM PRODUCTION: 0
CHILLS: 1
WEAKNESS: 0
DIARRHEA: 0
NERVOUS/ANXIOUS: 1
NECK PAIN: 0
HEMOPTYSIS: 0
EYE DISCHARGE: 0

## 2020-12-10 NOTE — PROGRESS NOTES
"Assumed care of patient at 0700. Received bedside report. Patient on 60 L of O2 100% FiO2 with non rebreather mask at 88%, respiration rate at 60, paged MD to bedside. ABGs ordered.     Restraints removed because patient so agitated by them, agreed to stop pulling at mask. Patient refusing to prone, refusing anxiety medication, tele sitter in place.  in use, patient stating \"he's not getting better so he needs to go home and see a specialist\" reassured patient that we are giving him medication for his illness and he needs to stay at the hospital. Family called to update and left a voicemail.   "

## 2020-12-10 NOTE — PROGRESS NOTES
Bedside report received and care assumed at start of shift.    Pt is A&Ox4 and on 50 L of high flow at 90%No reports of pain. VS stable. Tele monitor in place.    Pt is resting in bed. Call light is in reach and bed is locked in its lowest position. Hourly rounding in place. Assessment complete and all needs are attended to.

## 2020-12-10 NOTE — PROGRESS NOTES
Pt continually removes oxygen delivery devices. A&O 1 to self. Order for soft wrist restraints placed. Order for haldol 5mg also placed.    Tele sitter placed in room.    Pt continually breaks free from restraints. Security called due to pts combativeness. Haldol given.

## 2020-12-10 NOTE — PROGRESS NOTES
Pt jumped out of bed, removed high flow and oxymask and attempted to leave room. Pt O2 sat 53%. Mentation is altered as pt does not know where he is, why he is here or what time it is.     Pt placed back in bed and proned. O2 sat 89%.

## 2020-12-10 NOTE — PROGRESS NOTES
"Pts labored breathing was heard outside of room. Upon assessment, pts O2 sat was 47% and he had removed his high flow device. Oxygen delivery device was replaced and pts sats were between 60 and 70. Pt refused to prone stating \"it is too painful because I have neuropathy in my neck\". Pt complained of anxiety, ativan .5mg given. RT called and rapid response was initiated.     Nonrebreather placed over pts high flow and pt o2 sat is now 87%. Pt became agitated and began removing oxygen delivery devices. Pt placed in restraints and xanax .25mg given.    Doctor notified. Order placed for restraints. Xanax .5mg q6 PRN prescribed.  "

## 2020-12-10 NOTE — PROGRESS NOTES
Patient's sister called this RN stating he has prescription anxiety medications and gets them filled at [facility]. This RN called pharmacy at [facility] and received a med rec. Dr. Wyatt updated

## 2020-12-10 NOTE — RESPIRATORY CARE
Respiratory Rapid Response Note    Symptoms SOB, desaturation, anxiety     Breath Sounds  RUL Breath Sounds: Diminished (12/09/20 1850)  RML Breath Sounds: Diminished (12/09/20 1850)  RLL Breath Sounds: Diminished (12/09/20 1850)  RAMON Breath Sounds: Diminished (12/09/20 1850)  LLL Breath Sounds: Diminished (12/09/20 1850)  Cough: Dry (12/08/20 2000)          FiO2%: 100%  O2 (LPM): 60L     Events/Summary/Plan: Initiated HHFD per MD order (12/08/20 2588)  Transferred to ICU no

## 2020-12-11 ENCOUNTER — APPOINTMENT (OUTPATIENT)
Dept: CARDIOLOGY | Facility: MEDICAL CENTER | Age: 55
DRG: 871 | End: 2020-12-11
Attending: HOSPITALIST
Payer: COMMERCIAL

## 2020-12-11 ENCOUNTER — APPOINTMENT (OUTPATIENT)
Dept: RADIOLOGY | Facility: MEDICAL CENTER | Age: 55
DRG: 871 | End: 2020-12-11
Attending: HOSPITALIST
Payer: COMMERCIAL

## 2020-12-11 PROBLEM — R45.1 AGITATION: Status: RESOLVED | Noted: 2020-12-10 | Resolved: 2020-12-11

## 2020-12-11 PROBLEM — E11.9 TYPE 2 DIABETES MELLITUS WITHOUT COMPLICATION (HCC): Status: RESOLVED | Noted: 2020-12-09 | Resolved: 2020-12-11

## 2020-12-11 PROBLEM — R79.89 ELEVATED D-DIMER: Status: ACTIVE | Noted: 2020-12-11

## 2020-12-11 PROBLEM — K92.1 MELENA: Status: ACTIVE | Noted: 2020-12-11

## 2020-12-11 PROBLEM — E87.1 HYPONATREMIA: Status: RESOLVED | Noted: 2020-12-08 | Resolved: 2020-12-11

## 2020-12-11 LAB
ACTION RANGE TRIGGERED IACRT: YES
ALBUMIN SERPL BCP-MCNC: 3.2 G/DL (ref 3.2–4.9)
ALBUMIN/GLOB SERPL: 0.9 G/DL
ALP SERPL-CCNC: 166 U/L (ref 30–99)
ALT SERPL-CCNC: 60 U/L (ref 2–50)
ANION GAP SERPL CALC-SCNC: 12 MMOL/L (ref 7–16)
AST SERPL-CCNC: 51 U/L (ref 12–45)
BASE EXCESS BLDA CALC-SCNC: 3 MMOL/L (ref -4–3)
BASE EXCESS BLDA CALC-SCNC: 5 MMOL/L (ref -4–3)
BASOPHILS # BLD AUTO: 0.2 % (ref 0–1.8)
BASOPHILS # BLD: 0.03 K/UL (ref 0–0.12)
BILIRUB SERPL-MCNC: 0.7 MG/DL (ref 0.1–1.5)
BODY TEMPERATURE: ABNORMAL CENTIGRADE
BODY TEMPERATURE: ABNORMAL DEGREES
BUN SERPL-MCNC: 21 MG/DL (ref 8–22)
CALCIUM SERPL-MCNC: 8.5 MG/DL (ref 8.5–10.5)
CHLORIDE SERPL-SCNC: 98 MMOL/L (ref 96–112)
CO2 BLDA-SCNC: 29 MMOL/L (ref 20–33)
CO2 SERPL-SCNC: 27 MMOL/L (ref 20–33)
CREAT SERPL-MCNC: 1.01 MG/DL (ref 0.5–1.4)
CRP SERPL HS-MCNC: 6.97 MG/DL (ref 0–0.75)
D DIMER PPP IA.FEU-MCNC: >20 UG/ML (FEU) (ref 0–0.5)
EOSINOPHIL # BLD AUTO: 0 K/UL (ref 0–0.51)
EOSINOPHIL NFR BLD: 0 % (ref 0–6.9)
ERYTHROCYTE [DISTWIDTH] IN BLOOD BY AUTOMATED COUNT: 44.6 FL (ref 35.9–50)
GLOBULIN SER CALC-MCNC: 3.4 G/DL (ref 1.9–3.5)
GLUCOSE BLD-MCNC: 173 MG/DL (ref 65–99)
GLUCOSE BLD-MCNC: 190 MG/DL (ref 65–99)
GLUCOSE BLD-MCNC: 196 MG/DL (ref 65–99)
GLUCOSE BLD-MCNC: 206 MG/DL (ref 65–99)
GLUCOSE SERPL-MCNC: 189 MG/DL (ref 65–99)
HCO3 BLDA-SCNC: 25 MMOL/L (ref 17–25)
HCO3 BLDA-SCNC: 28.1 MMOL/L (ref 17–25)
HCT VFR BLD AUTO: 40.4 % (ref 42–52)
HGB BLD-MCNC: 13.7 G/DL (ref 14–18)
HGB BLD-MCNC: 13.7 G/DL (ref 14–18)
HGB BLD-MCNC: 14 G/DL (ref 14–18)
HOROWITZ INDEX BLDA+IHG-RTO: 50 MM[HG]
IMM GRANULOCYTES # BLD AUTO: 0.25 K/UL (ref 0–0.11)
IMM GRANULOCYTES NFR BLD AUTO: 1.3 % (ref 0–0.9)
INST. QUALIFIED PATIENT IIQPT: YES
LACTATE BLD-SCNC: 2.6 MMOL/L (ref 0.5–2)
LACTATE BLD-SCNC: 3.1 MMOL/L (ref 0.5–2)
LV EJECT FRACT  99904: 70
LV EJECT FRACT MOD 2C 99903: 76.94
LV EJECT FRACT MOD 4C 99902: 75.58
LV EJECT FRACT MOD BP 99901: 76.24
LYMPHOCYTES # BLD AUTO: 0.89 K/UL (ref 1–4.8)
LYMPHOCYTES NFR BLD: 4.7 % (ref 22–41)
MCH RBC QN AUTO: 30.4 PG (ref 27–33)
MCHC RBC AUTO-ENTMCNC: 33.9 G/DL (ref 33.7–35.3)
MCV RBC AUTO: 89.6 FL (ref 81.4–97.8)
MONOCYTES # BLD AUTO: 1.19 K/UL (ref 0–0.85)
MONOCYTES NFR BLD AUTO: 6.3 % (ref 0–13.4)
NEUTROPHILS # BLD AUTO: 16.42 K/UL (ref 1.82–7.42)
NEUTROPHILS NFR BLD: 87.5 % (ref 44–72)
NRBC # BLD AUTO: 0 K/UL
NRBC BLD-RTO: 0 /100 WBC
O2/TOTAL GAS SETTING VFR VENT: 100 %
O2/TOTAL GAS SETTING VFR VENT: 50 % (ref 30–60)
PCO2 BLDA: 33.2 MMHG (ref 26–37)
PCO2 BLDA: 35.9 MMHG (ref 26–37)
PCO2 TEMP ADJ BLDA: 34.4 MMHG (ref 26–37)
PH BLDA: 7.5 [PH] (ref 7.4–7.5)
PH BLDA: 7.5 [PH] (ref 7.4–7.5)
PH TEMP ADJ BLDA: 7.52 [PH] (ref 7.4–7.5)
PLATELET # BLD AUTO: 380 K/UL (ref 164–446)
PMV BLD AUTO: 9 FL (ref 9–12.9)
PO2 BLDA: 49 MMHG (ref 64–87)
PO2 BLDA: 50 MMHG (ref 64–87)
PO2 TEMP ADJ BLDA: 47 MMHG (ref 64–87)
POTASSIUM SERPL-SCNC: 3.7 MMOL/L (ref 3.6–5.5)
PROCALCITONIN SERPL-MCNC: 3.42 NG/ML
PROT SERPL-MCNC: 6.6 G/DL (ref 6–8.2)
RBC # BLD AUTO: 4.51 M/UL (ref 4.7–6.1)
SAO2 % BLDA: 84.9 % (ref 93–99)
SAO2 % BLDA: 88 % (ref 93–99)
SODIUM SERPL-SCNC: 137 MMOL/L (ref 135–145)
SPECIMEN DRAWN FROM PATIENT: ABNORMAL
WBC # BLD AUTO: 18.8 K/UL (ref 4.8–10.8)

## 2020-12-11 PROCEDURE — 82962 GLUCOSE BLOOD TEST: CPT | Mod: 91

## 2020-12-11 PROCEDURE — 94640 AIRWAY INHALATION TREATMENT: CPT

## 2020-12-11 PROCEDURE — 93970 EXTREMITY STUDY: CPT

## 2020-12-11 PROCEDURE — 93308 TTE F-UP OR LMTD: CPT | Mod: 26 | Performed by: INTERNAL MEDICINE

## 2020-12-11 PROCEDURE — 85025 COMPLETE CBC W/AUTO DIFF WBC: CPT

## 2020-12-11 PROCEDURE — 700111 HCHG RX REV CODE 636 W/ 250 OVERRIDE (IP): Performed by: HOSPITALIST

## 2020-12-11 PROCEDURE — 700105 HCHG RX REV CODE 258: Performed by: HOSPITALIST

## 2020-12-11 PROCEDURE — 86140 C-REACTIVE PROTEIN: CPT

## 2020-12-11 PROCEDURE — 99233 SBSQ HOSP IP/OBS HIGH 50: CPT | Performed by: HOSPITALIST

## 2020-12-11 PROCEDURE — 700102 HCHG RX REV CODE 250 W/ 637 OVERRIDE(OP): Performed by: HOSPITALIST

## 2020-12-11 PROCEDURE — 770022 HCHG ROOM/CARE - ICU (200)

## 2020-12-11 PROCEDURE — 82803 BLOOD GASES ANY COMBINATION: CPT | Mod: 91

## 2020-12-11 PROCEDURE — A9270 NON-COVERED ITEM OR SERVICE: HCPCS | Performed by: HOSPITALIST

## 2020-12-11 PROCEDURE — 99291 CRITICAL CARE FIRST HOUR: CPT | Performed by: INTERNAL MEDICINE

## 2020-12-11 PROCEDURE — 71045 X-RAY EXAM CHEST 1 VIEW: CPT

## 2020-12-11 PROCEDURE — 93321 DOPPLER ECHO F-UP/LMTD STD: CPT | Mod: 26 | Performed by: INTERNAL MEDICINE

## 2020-12-11 PROCEDURE — 84145 PROCALCITONIN (PCT): CPT

## 2020-12-11 PROCEDURE — 700102 HCHG RX REV CODE 250 W/ 637 OVERRIDE(OP): Performed by: INTERNAL MEDICINE

## 2020-12-11 PROCEDURE — A9270 NON-COVERED ITEM OR SERVICE: HCPCS | Performed by: INTERNAL MEDICINE

## 2020-12-11 PROCEDURE — 94760 N-INVAS EAR/PLS OXIMETRY 1: CPT

## 2020-12-11 PROCEDURE — 85379 FIBRIN DEGRADATION QUANT: CPT

## 2020-12-11 PROCEDURE — 85018 HEMOGLOBIN: CPT

## 2020-12-11 PROCEDURE — 700105 HCHG RX REV CODE 258: Performed by: INTERNAL MEDICINE

## 2020-12-11 PROCEDURE — 36600 WITHDRAWAL OF ARTERIAL BLOOD: CPT

## 2020-12-11 PROCEDURE — 700101 HCHG RX REV CODE 250: Performed by: INTERNAL MEDICINE

## 2020-12-11 PROCEDURE — 80053 COMPREHEN METABOLIC PANEL: CPT

## 2020-12-11 PROCEDURE — 99292 CRITICAL CARE ADDL 30 MIN: CPT | Performed by: EMERGENCY MEDICINE

## 2020-12-11 PROCEDURE — 83605 ASSAY OF LACTIC ACID: CPT | Mod: 91

## 2020-12-11 PROCEDURE — 93325 DOPPLER ECHO COLOR FLOW MAPG: CPT

## 2020-12-11 RX ORDER — OMEPRAZOLE 20 MG/1
20 CAPSULE, DELAYED RELEASE ORAL DAILY
Status: DISCONTINUED | OUTPATIENT
Start: 2020-12-11 | End: 2021-01-09 | Stop reason: HOSPADM

## 2020-12-11 RX ORDER — GABAPENTIN 100 MG/1
400 CAPSULE ORAL 2 TIMES DAILY
COMMUNITY

## 2020-12-11 RX ORDER — ESCITALOPRAM OXALATE 5 MG/1
10 TABLET ORAL DAILY
Status: ON HOLD | COMMUNITY
End: 2021-01-08

## 2020-12-11 RX ORDER — OLANZAPINE 2.5 MG/1
5 TABLET, FILM COATED ORAL DAILY
Status: ON HOLD | COMMUNITY
End: 2021-01-08

## 2020-12-11 RX ADMIN — ESCITALOPRAM OXALATE 10 MG: 10 TABLET ORAL at 05:07

## 2020-12-11 RX ADMIN — DEXAMETHASONE 6 MG: 4 TABLET ORAL at 05:07

## 2020-12-11 RX ADMIN — ENOXAPARIN SODIUM 40 MG: 40 INJECTION SUBCUTANEOUS at 05:06

## 2020-12-11 RX ADMIN — GABAPENTIN 400 MG: 400 CAPSULE ORAL at 05:07

## 2020-12-11 RX ADMIN — FUROSEMIDE 20 MG: 10 INJECTION, SOLUTION INTRAMUSCULAR; INTRAVENOUS at 16:43

## 2020-12-11 RX ADMIN — LINEZOLID 600 MG: 600 TABLET, FILM COATED ORAL at 05:07

## 2020-12-11 RX ADMIN — FUROSEMIDE 20 MG: 10 INJECTION, SOLUTION INTRAMUSCULAR; INTRAVENOUS at 05:07

## 2020-12-11 RX ADMIN — INSULIN HUMAN 1 UNITS: 100 INJECTION, SOLUTION PARENTERAL at 07:47

## 2020-12-11 RX ADMIN — AMPICILLIN AND SULBACTAM 3 G: 2; 1 INJECTION, POWDER, FOR SOLUTION INTRAVENOUS at 11:39

## 2020-12-11 RX ADMIN — AMPICILLIN AND SULBACTAM 3 G: 2; 1 INJECTION, POWDER, FOR SOLUTION INTRAVENOUS at 17:38

## 2020-12-11 RX ADMIN — AMPICILLIN AND SULBACTAM 3 G: 2; 1 INJECTION, POWDER, FOR SOLUTION INTRAVENOUS at 23:31

## 2020-12-11 RX ADMIN — INSULIN HUMAN 2 UNITS: 100 INJECTION, SOLUTION PARENTERAL at 16:45

## 2020-12-11 RX ADMIN — AMPICILLIN AND SULBACTAM 3 G: 2; 1 INJECTION, POWDER, FOR SOLUTION INTRAVENOUS at 05:06

## 2020-12-11 RX ADMIN — GABAPENTIN 400 MG: 400 CAPSULE ORAL at 18:00

## 2020-12-11 RX ADMIN — REMDESIVIR 100 MG: 100 INJECTION, POWDER, LYOPHILIZED, FOR SOLUTION INTRAVENOUS at 20:53

## 2020-12-11 RX ADMIN — INSULIN HUMAN 1 UNITS: 100 INJECTION, SOLUTION PARENTERAL at 11:49

## 2020-12-11 RX ADMIN — OMEPRAZOLE 20 MG: 20 CAPSULE, DELAYED RELEASE ORAL at 11:42

## 2020-12-11 RX ADMIN — INSULIN HUMAN 1 UNITS: 100 INJECTION, SOLUTION PARENTERAL at 20:54

## 2020-12-11 ASSESSMENT — ENCOUNTER SYMPTOMS
HEARTBURN: 0
MYALGIAS: 0
PALPITATIONS: 1
BLURRED VISION: 0
DIARRHEA: 0
FOCAL WEAKNESS: 0
SORE THROAT: 0
NECK PAIN: 0
ORTHOPNEA: 0
HEADACHES: 0
HEMOPTYSIS: 0
STRIDOR: 0
CONSTIPATION: 0
SPUTUM PRODUCTION: 0
SENSORY CHANGE: 0
DIAPHORESIS: 0
MYALGIAS: 1
DIZZINESS: 0
ABDOMINAL PAIN: 0
NAUSEA: 0
FEVER: 0
WEAKNESS: 0
VOMITING: 0
EYE DISCHARGE: 0
COUGH: 1
SHORTNESS OF BREATH: 1
NERVOUS/ANXIOUS: 1
CHILLS: 0

## 2020-12-11 NOTE — CARE PLAN
Problem: Nutritional:  Goal: Achieve adequate nutritional intake  Description: Patient will consume 50% of meals + supplements  Outcome: PROGRESSING SLOWER THAN EXPECTED     See RD note.

## 2020-12-11 NOTE — DISCHARGE PLANNING
Care Transition Team Assessment  Attempted to call sister Nadia [phone], no answer. Called other sister Aleyda [phone] who is Kyrgyz speaking. Spoke w/ Aleyda w/ translation from language line. Aleyda states sister Nadia speaks English. Pt lives w/ father (who is also currently hospitalized at [facility] per Aleyda). Independent w/ ADL/IADL's, no hx of DME. Verified facesheet info w/ Aleyda. Marital status single. Aleyda states pt has no SSN, was working PT job but received very little income and not sure exact amount. Aleyda states Nadia should have more info. Aleyda requests assistance w/ emergency Medicaid. Emailed PFA. Pt has Access to Healthcare insurance. PCP Ira Saba at [facility] [phone].     Information Source  Orientation : Disoriented to Place, Disoriented to Time, Disoriented to Event  Information Given By: Other (Comments)  Informant's Name: Aleyda Hall  Who is responsible for making decisions for patient? : Patient         Elopement Risk  Legal Hold: No    Interdisciplinary Discharge Planning  Primary Care Physician: Ira Saba  Lives with - Patient's Self Care Capacity: Parents  Support Systems: Family Member(s), Parent  Able to Return to Previous ADL's: Yes  Mobility Issues: No  Prior Services: Home-Independent  Assistance Needed: Unknown at this Time    Discharge Preparedness  What is your plan after discharge?: Uncertain - pending medical team collaboration  What are your discharge supports?: Parent, Sibling  Prior Functional Level: Independent with Activities of Daily Living    Functional Assesment  Prior Functional Level: Independent with Activities of Daily Living    Finances  Financial Barriers to Discharge: Yes    Vision / Hearing Impairment  Vision Impairment : No  Hearing Impairment : No         Advance Directive  Advance Directive?: None    Domestic Abuse  Have you ever been the victim of abuse or violence?: No  Physical Abuse or Sexual Abuse: No  Verbal  Abuse or Emotional Abuse: No  Possible Abuse/Neglect Reported to:: Not Applicable         Discharge Risks or Barriers  Discharge risks or barriers?: Uninsured / underinsured  Patient risk factors: Uninsured or underinsured    Anticipated Discharge Information  Discharge Address: [address], [place] [address]  Discharge Contact Phone Number: [phone]

## 2020-12-11 NOTE — PROGRESS NOTES
Rapid team notified of nurse concern PaO2 49. Per RN patient to received ICU orders. Rapid team to bedside to assist with transfer.

## 2020-12-11 NOTE — ASSESSMENT & PLAN NOTE
This is Severe Sepsis Present on admission  SIRS criteria identified on my evaluation include: Fever, with temperature greater than 101 deg F, Tachycardia, with heart rate greater than 90 BPM, Tachypnea, with respirations greater than 20 per minute and Leukocytosis, with WBC greater than 12,000  Source of infection is pulmonary  Clinical indicators of end organ dysfunction include Acute respiratory failure as evidenced by a new need for invasive or non-invasive mechanical ventilation (Ventilator or BiPap)   Sepsis protocol initiated  Fluid resuscitation ordered per protocol  IV antibiotics as appropriate for source of sepsis  Reassessment: I have reassessed the patient's hemodynamic status  End organ dysfunction include(s):  Acute respiratory failure    Fluid resuscitation - judicious fluids given COVID-19  Antimicrobials -ampicillin-sulbactam

## 2020-12-11 NOTE — PROGRESS NOTES
Dr. Wyatt aware of lactic acid of 2.6. Due to respiratory status no fluids at this time. RN will continue to monitor.

## 2020-12-11 NOTE — PROGRESS NOTES
Notified Dr. Wyatt of respirations of 44 with SpO2 of 79. Stat ABG ordered per Dr. Wyatt. RN will continue to monitor.

## 2020-12-11 NOTE — PROGRESS NOTES
Patient continues to pull at IV and taking off non-rebreather mask and HFNC, patient reoriented multiple times but continues to not comply. Nonviolent soft wrist restraints applied to R and L wrist. Dr. Wyatt notified at 1815.  Patient tachypnic, labored breathing, O2 saturation in the 80%, Rapid Reponse RN and MD aware.

## 2020-12-11 NOTE — DIETARY
Nutrition Services: Screen    · Admit day 3, rapid called and pt transferred to ICU today.   · Pt is currently on Consistent CHO (Diabetic) diet. PO on 12/9 was 50 - 75% x 1 meal however, noted with significant decline in the last day (0 - < 25% x 3) + 75 - 100% x 1 snack.   · Meds: decadron, lasix, SSI, and remdesivir.   · + HFNC.   · Trial Boost Glucose Control with meals to optimize PO.     Malnutrition Risk: Unable to assess @ this time.     Recommendations/Plan:  1. Continue diet as ordered. Add Boost GC with meals.    2. Encourage intake of meals. Document intake of all meals as % taken in ADL's to provide interdisciplinary communication across all shifts.   3. Monitor weight.    RD following

## 2020-12-11 NOTE — ASSESSMENT & PLAN NOTE
12/21 WBC: 18.1  12/20 WBC:21.8  12/19 WBC:22  12/17 WBC:17.6  monitor vitals.  Continue to monitor

## 2020-12-11 NOTE — PROGRESS NOTES
Hospital Medicine Daily Progress Note    Date of Service  12/11/2020    Chief Complaint  55 y.o. male admitted 12/8/2020 with   Chief Complaint   Patient presents with   • Shortness of Breath     pt has diarrhea, vomiting, sob, cough the last two weeks.          Hospital Course  This 55-year-old male with a past medical history significant for chronic neuropathy, type 2 diabetes mellitus with glycol 7.3 presented to ER on 12/8/920 with a complaint of worsening shortness of breath.  He stated he had loose bowel movement, nausea, vomiting with generalized body ache.    In ER he is found to be COVID-19 positive, noted to be in respiratory distress, placed on high flow.  Evaluated by intensivist Dr. Greenberg, recs monitoring patient in tele.    He is noted to have lactic acidosis of 8.5 likely secondary to hypoxia versus hypovolemia, continue to trend.  He is also noted to have elevated procalcitonin at 2.12, WBC at 22.2; he was empirically treated with IV antibiotics for superimposed bacterial pneumonia in addition to Covid pneumonia.    Also noted to have elevated liver enzyme with AST/ALT 77/74 trending down to 66/72  He is noted to have Glyco at 7.3    Today a.m., patient is noted to be in respiratory distress, Using accessory muscles; desaturating in spite of being on the maximum high flow plus nonrebreather, ABG obtained showed PO2 of 49, intensivist contacted and patient was transferred to ICU for higher level of care.    Interval Problem Update  No acute events overnight, patient noted to be respiratory distress, did not want to do self proning, explained the benefit of self/turning into the left side.   --Currently laying on the side, on high flow, continue.  ID consulted for remdesivir, will continue for total of 5 days.   --Continue Decadron 6 mg p.o. daily    12/10:    Patient is noted to be agitated, confused, started on Haldol.  Patient required restraint intermittently.  He has been febrile with a  temperature of 100.7.  His psych medication has been restarted.   --- Patient is currently requiring maximum high flow, continue Decadron 6 mg p.o. daily, self proning / turing to his side   Started on restrains   --considering patient oxygen demand, he was started on IV Lasix, antibiotics broadened to Zyvox plus Unasyn.  Monitor    12/11:  --No acute events overnight.  Patient noted to be afebrile.  In the morning, patient is noted to be in respiratory distress, noted to be hypoxic even on 60 L at 100% along with nonrebreather, ABG was obtained which showed PO2 49.   --Also noted to have elevated D-dimer > 20; started on weight-based Lovenox.  --He is noted to have elevated procalcitonin but MRSA nasal swab negative, thus stopped  Zyvox  --AST/ALT 51/60  -- Lactic acidosis     --Continue Decadron and remdesivir     Consultants/Specialty  Intensivist    Code Status  Full Code    Disposition  To be Determined    Review of Systems  Review of Systems   Constitutional: Positive for malaise/fatigue. Negative for chills and diaphoresis.   HENT: Negative for congestion and sore throat.    Eyes: Negative for blurred vision and discharge.   Respiratory: Positive for cough and shortness of breath. Negative for hemoptysis and sputum production.    Cardiovascular: Positive for palpitations. Negative for chest pain and orthopnea.   Gastrointestinal: Negative for abdominal pain, constipation, diarrhea and heartburn.   Genitourinary: Negative for dysuria.   Musculoskeletal: Positive for myalgias. Negative for neck pain.   Neurological: Negative for dizziness and weakness.   Psychiatric/Behavioral: The patient is nervous/anxious.         Agitated        Physical Exam  Temp:  [36.1 °C (96.9 °F)-38 °C (100.4 °F)] 36.1 °C (96.9 °F)  Pulse:  [] 65  Resp:  [19-31] 31  BP: ()/(58-78) 104/58  SpO2:  [84 %-92 %] 87 %    Physical Exam  Vitals signs and nursing note reviewed.   Constitutional:       Appearance: Normal appearance.    HENT:      Head: Normocephalic and atraumatic.   Eyes:      Extraocular Movements: Extraocular movements intact.   Neck:      Musculoskeletal: Neck supple.   Cardiovascular:      Rate and Rhythm: Normal rate.      Pulses: Normal pulses.      Heart sounds: No murmur.   Pulmonary:      Effort: Respiratory distress present.      Breath sounds: Rales present.      Comments:  Poor and shallow breath sounds  Abdominal:      General: Abdomen is flat. There is no distension.      Palpations: Abdomen is soft.      Tenderness: There is no abdominal tenderness.   Musculoskeletal:         General: No swelling.      Right lower leg: No edema.      Left lower leg: No edema.   Skin:     General: Skin is warm.   Neurological:      Mental Status: He is alert and oriented to person, place, and time.      Cranial Nerves: No cranial nerve deficit.   Psychiatric:         Mood and Affect: Mood normal.      Comments: Agitated and restless         Fluids    Intake/Output Summary (Last 24 hours) at 12/11/2020 1210  Last data filed at 12/11/2020 1000  Gross per 24 hour   Intake 1170 ml   Output 2150 ml   Net -980 ml       Laboratory  Recent Labs     12/09/20  0127 12/10/20  0459 12/11/20  0748   WBC 15.2* 20.0* 18.8*   RBC 4.39* 4.06* 4.51*   HEMOGLOBIN 13.0* 12.2* 13.7*   HEMATOCRIT 37.6* 35.7* 40.4*   MCV 85.6 87.9 89.6   MCH 29.6 30.0 30.4   MCHC 34.6 34.2 33.9   RDW 40.5 44.0 44.6   PLATELETCT 261 336 380   MPV 9.2 8.7* 9.0     Recent Labs     12/09/20  0519 12/10/20  0459 12/11/20  0748   SODIUM 130* 134* 137   POTASSIUM 3.8 3.5* 3.7   CHLORIDE 97 98 98   CO2 25 25 27   GLUCOSE 221* 166* 189*   BUN 13 15 21   CREATININE 0.84 0.86 1.01   CALCIUM 8.5 8.3* 8.5     Recent Labs     12/08/20  1532   INR 0.94               Imaging  EC-ECHOCARDIOGRAM LTD W/O CONT   Final Result      DX-CHEST-LIMITED (1 VIEW)   Final Result      No significant change in bilateral pneumonia.      DX-CHEST-LIMITED (1 VIEW)   Final Result         Persistent  bilateral pulmonary opacities consistent with Covid 19 pneumonia.      DX-CHEST-PORTABLE (1 VIEW)   Final Result      Bilateral peripheral pulmonary airspace process highly suspicious for Covid pneumonia      US-EXTREMITY VENOUS LOWER BILAT    (Results Pending)        Assessment/Plan  * Pneumonia due to COVID-19 virus  Assessment & Plan  Patient is on high flow, will continue Decadron 6 mg p.o. daily, continue remdesivir.   -- continue incentive spirometer, provide Zn and ascorbic acid   --Encourage proning    Patient does have superimposed bacterial pneumonia, will continue Unasyn; stop Zyvox as MRSA nasal swab negative  Obtain DVT study, echocardiogram  Will be started on weight-based Lovenox    Agitation- (present on admission)  Assessment & Plan  Patient noted to be agitated, on restrains    Leukocytosis  Assessment & Plan  At 18ss ,  Likely 2/2 steroid  Vs superimposed bacterial infection    Type 2 diabetes mellitus without complication (HCC)- (present on admission)  Assessment & Plan  Glyco at 7.2, continue hypoglycemia protocol, accu-checks AC and HS    -- diabetic diet    Hyponatremia- (present on admission)  Assessment & Plan  Resolved, Na at 137    Abnormal LFTs  Assessment & Plan  Suspect due to covid 19   Cont to trend    Sepsis (HCC)- (present on admission)  Assessment & Plan  This is Severe Sepsis Present on admission  SIRS criteria identified on my evaluation include: Tachycardia, with heart rate greater than 90 BPM, Tachypnea, with respirations greater than 20 per minute and Leukocytosis, with WBC greater than 12,000  Source of infection is pulmonary  Clinical indicators of end organ dysfunction include Lactate >2 mmol/L (18.0 mg/dL)  Sepsis protocol initiated  Fluid resuscitation ordered per protocol  IV antibiotics as appropriate for source of sepsis  Reassessment: I have reassessed the patient's hemodynamic status  End organ dysfunction include(s):  Acute respiratory failure       Suspect likely  pulmonary due to covid 19    -Empiric IV abx   -- Titarte O2 as needed    Lactic acidosis- (present on admission)  Assessment & Plan  At 2.6, continue to trend    Acute hypoxemic respiratory failure (HCC)- (present on admission)  Assessment & Plan  Likely  secondary to Covid pneumonia superimposed to the patient with pneumonia   --- We will continue Decadron 6 mg p.o. daily, remdesivir   --- Encourage incentive spirometer, self proning, titrate oxygen as needed      Continues to remain hypoxic despite being on 6 L 100% along with Non-rebreather--> discussed with Dr Schroeder and transferred to ICU    Neuropathic pain- (present on admission)  Assessment & Plan  Chronic hx of, has been without medications for over a year   - continue gabapentin       VTE prophylaxis: Lovenox

## 2020-12-11 NOTE — CONSULTS
Critical Care Consultation    Date of consult: 12/11/2020    Referring Physician  Mireille Wyatt M.D.    Reason for Consultation  Acute hypoxic respiratory failure 2/2 COVID-19    History of Presenting Illness  55 y.o. male with a pmhx of LE neuropathy who presented 12/8/2020 with dyspnea. These symptoms started 9 days prior to presentation and were associated with N/V/D and malaise. He was found to have COVID-19 and started on HFNC, he was evaluated and determined to be best suited for telemetry. He was started on remdesivir and decadron. Today his dyspnea worsened as did his hypoxia. Repeat labs were obtained which showed an improving CRP and PCT but a worsened D-dimer. The patient has no signs of VTE, LE US and echo have been ordered. ABG showed a worsened P:F ratio. At this time the patient denies pain or severe dyspnea. He was noted to have a melenotic BM prior to transfer but denies emesis.    Code Status  Full Code    Review of Systems  Review of Systems   Constitutional: Positive for malaise/fatigue. Negative for chills and fever.   Eyes: Negative for blurred vision.   Respiratory: Positive for cough and shortness of breath. Negative for stridor.    Cardiovascular: Negative for chest pain.   Gastrointestinal: Positive for melena. Negative for abdominal pain, nausea and vomiting.   Genitourinary: Negative for dysuria.   Musculoskeletal: Negative for myalgias.   Skin: Negative for rash.   Neurological: Negative for dizziness, sensory change and focal weakness.       Past Medical History   has a past medical history of Pneumonia due to COVID-19 virus (12/8/2020). He also has no past medical history of Angina, Arrhythmia, Arthritis, ASTHMA, Backpain, Bronchitis, Cancer (HCC), CATARACT, Congestive heart failure (HCC), COPD, Diabetes, Dialysis, Fall, Glaucoma, Heart murmur, Heart valve disease, Hypertension, Indigestion, Jaundice, Myocardial infarct (HCC), Other specified symptom associated with female genital  organs, Pacemaker, Personal history of venous thrombosis and embolism, Psychiatric problem, Renal disorder, Rheumatic fever, Seizure (HCC), Stroke (HCC), Unspecified disorder of thyroid, Unspecified hemorrhagic conditions, or Unspecified urinary incontinence.    Surgical History   has no past surgical history on file.    Family History  family history is not on file.    Social History   reports that he has never smoked. He does not have any smokeless tobacco history on file. He reports that he does not drink alcohol or use drugs.    Medications  Home Medications     Reviewed by Neela Argueta ([facility]) on 12/08/20 at 1743  Med List Status: Complete   Medication Last Dose Status   gabapentin (NEURONTIN) 100 MG CAPS Not Taking Active   tramadol (ULTRAM) 50 MG TABS Not Taking Active              Current Facility-Administered Medications   Medication Dose Route Frequency Provider Last Rate Last Admin   • enoxaparin (LOVENOX) inj 60 mg  60 mg Subcutaneous Q12HRS Mireille Wyatt M.D.       • haloperidol lactate (HALDOL) injection 2-5 mg  2-5 mg Intravenous Q4HRS PRN Rylie Darden M.D.   5 mg at 12/10/20 1621   • furosemide (LASIX) injection 20 mg  20 mg Intravenous BID DIURETIC KRZYSZTOF NgDSrinivasan   20 mg at 12/11/20 0507   • linezolid (ZYVOX) tablet 600 mg  600 mg Oral Q12HRS KRZYSZTOF NgDSrinivasan   600 mg at 12/11/20 0507   • oxyCODONE immediate-release (ROXICODONE) tablet 5 mg  5 mg Oral Q4HRS PRN Mireille Wyatt M.D.       • escitalopram (Lexapro) tablet 10 mg  10 mg Oral Daily-0600 KRZYSZTOF NgDSrinivasan   10 mg at 12/11/20 0507   • OLANZapine (ZYPREXA) tablet 5 mg  5 mg Oral Q EVENING KRZYSZTOF NgDSrinivasan   5 mg at 12/10/20 1709   • gabapentin (NEURONTIN) capsule 400 mg  400 mg Oral BID KRZYSZTOF NgDSrinivasan   400 mg at 12/11/20 0507   • LORazepam (ATIVAN) tablet 0.5 mg  0.5 mg Oral TID PRN Mireille Wyatt M.D.   0.5 mg at 12/10/20 1400   • remdesivir (Veklury) 100 mg in  mL IVPB  100 mg Intravenous DAILY  AT 1800 Nicole Henry M.D.   Stopped at 12/10/20 1942   • insulin regular (HumuLIN R,NovoLIN R) injection  1-6 Units Subcutaneous 4X/DAY KAREN Wyatt M.D.   1 Units at 12/11/20 0747    And   • glucose 4 g chewable tablet 16 g  16 g Oral Q15 MIN PRN Mireille Wyatt M.D.        And   • dextrose 50% (D50W) injection 50 mL  50 mL Intravenous Q15 MIN PRN Mireille Wyatt M.D.       • ALPRAZolam (XANAX) tablet 0.5 mg  0.5 mg Oral Q6HRS PRN Romain Pro M.D.   0.5 mg at 12/10/20 2356   • ondansetron (ZOFRAN) syringe/vial injection 4 mg  4 mg Intravenous Q6HRS PRN Asif Villafuerte M.D.       • ondansetron (ZOFRAN ODT) dispertab 4 mg  4 mg Oral Q6HRS PRN Asif Villafuerte M.D.       • acetaminophen (Tylenol) tablet 650 mg  650 mg Oral Q6HRS PRN Aisf Villafuerte M.D.   650 mg at 12/09/20 1416   • Respiratory Therapy Consult   Nebulization Continuous RT Asif Villafuerte M.D.       • ampicillin/sulbactam (UNASYN) 3 g in  mL IVPB  3 g Intravenous Q6HRS Asif Villafuerte M.D.   Stopped at 12/11/20 0536   • dexamethasone (DECADRON) tablet 6 mg  6 mg Oral DAILY Asif Villafuerte M.D.   6 mg at 12/11/20 0507       Allergies  No Known Allergies    Vital Signs last 24 hours  Temp:  [36.1 °C (97 °F)-38 °C (100.4 °F)] 36.4 °C (97.6 °F)  Pulse:  [] 85  Resp:  [20-28] 26  BP: ()/(62-78) 96/68  SpO2:  [85 %-92 %] 85 %    Physical Exam  Physical Exam  Vitals signs and nursing note reviewed.   Constitutional:       General: He is in acute distress.      Appearance: Normal appearance. He is well-developed and normal weight. He is ill-appearing.      Comments: HFNC in place   HENT:      Head: Normocephalic and atraumatic.      Right Ear: External ear normal.      Left Ear: External ear normal.      Nose: Nose normal.      Mouth/Throat:      Mouth: Mucous membranes are moist.   Eyes:      Extraocular Movements: Extraocular movements intact.      Conjunctiva/sclera: Conjunctivae normal.   Neck:       Musculoskeletal: Neck supple.      Vascular: No JVD.      Trachea: No tracheal deviation.   Cardiovascular:      Rate and Rhythm: Normal rate and regular rhythm.      Pulses: Normal pulses.   Pulmonary:      Effort: Pulmonary effort is normal. Tachypnea present.      Breath sounds: Rales present.   Abdominal:      General: Bowel sounds are normal. There is no distension.      Palpations: Abdomen is soft.   Musculoskeletal:         General: No tenderness.      Right lower leg: No edema.      Left lower leg: No edema.      Comments: Negative Aureliano's   Skin:     General: Skin is warm and dry.      Capillary Refill: Capillary refill takes less than 2 seconds.      Findings: No rash.   Neurological:      General: No focal deficit present.      Mental Status: He is alert and oriented to person, place, and time.      Cranial Nerves: No cranial nerve deficit.      Sensory: No sensory deficit.      Motor: No weakness.   Psychiatric:         Mood and Affect: Mood normal.         Behavior: Behavior normal.         Fluids    Intake/Output Summary (Last 24 hours) at 12/11/2020 0957  Last data filed at 12/11/2020 0941  Gross per 24 hour   Intake 1220 ml   Output 2550 ml   Net -1330 ml       Laboratory  Recent Results (from the past 48 hour(s))   LACTIC ACID    Collection Time: 12/09/20 12:36 PM   Result Value Ref Range    Lactic Acid 1.8 0.5 - 2.0 mmol/L   LACTIC ACID    Collection Time: 12/09/20  4:24 PM   Result Value Ref Range    Lactic Acid 1.7 0.5 - 2.0 mmol/L   ACCU-CHEK GLUCOSE    Collection Time: 12/09/20  6:17 PM   Result Value Ref Range    Glucose - Accu-Ck 278 (H) 65 - 99 mg/dL   LACTIC ACID    Collection Time: 12/09/20  8:32 PM   Result Value Ref Range    Lactic Acid 3.4 (H) 0.5 - 2.0 mmol/L   ACCU-CHEK GLUCOSE    Collection Time: 12/09/20  9:18 PM   Result Value Ref Range    Glucose - Accu-Ck 184 (H) 65 - 99 mg/dL   CBC WITH DIFFERENTIAL    Collection Time: 12/10/20  4:59 AM   Result Value Ref Range    WBC 20.0 (H)  4.8 - 10.8 K/uL    RBC 4.06 (L) 4.70 - 6.10 M/uL    Hemoglobin 12.2 (L) 14.0 - 18.0 g/dL    Hematocrit 35.7 (L) 42.0 - 52.0 %    MCV 87.9 81.4 - 97.8 fL    MCH 30.0 27.0 - 33.0 pg    MCHC 34.2 33.7 - 35.3 g/dL    RDW 44.0 35.9 - 50.0 fL    Platelet Count 336 164 - 446 K/uL    MPV 8.7 (L) 9.0 - 12.9 fL    Neutrophils-Polys 89.40 (H) 44.00 - 72.00 %    Lymphocytes 3.70 (L) 22.00 - 41.00 %    Monocytes 5.90 0.00 - 13.40 %    Eosinophils 0.00 0.00 - 6.90 %    Basophils 0.10 0.00 - 1.80 %    Immature Granulocytes 0.90 0.00 - 0.90 %    Nucleated RBC 0.00 /100 WBC    Neutrophils (Absolute) 17.91 (H) 1.82 - 7.42 K/uL    Lymphs (Absolute) 0.75 (L) 1.00 - 4.80 K/uL    Monos (Absolute) 1.18 (H) 0.00 - 0.85 K/uL    Eos (Absolute) 0.00 0.00 - 0.51 K/uL    Baso (Absolute) 0.02 0.00 - 0.12 K/uL    Immature Granulocytes (abs) 0.18 (H) 0.00 - 0.11 K/uL    NRBC (Absolute) 0.00 K/uL   Comp Metabolic Panel    Collection Time: 12/10/20  4:59 AM   Result Value Ref Range    Sodium 134 (L) 135 - 145 mmol/L    Potassium 3.5 (L) 3.6 - 5.5 mmol/L    Chloride 98 96 - 112 mmol/L    Co2 25 20 - 33 mmol/L    Anion Gap 11.0 7.0 - 16.0    Glucose 166 (H) 65 - 99 mg/dL    Bun 15 8 - 22 mg/dL    Creatinine 0.86 0.50 - 1.40 mg/dL    Calcium 8.3 (L) 8.5 - 10.5 mg/dL    AST(SGOT) 62 (H) 12 - 45 U/L    ALT(SGPT) 66 (H) 2 - 50 U/L    Alkaline Phosphatase 167 (H) 30 - 99 U/L    Total Bilirubin 0.6 0.1 - 1.5 mg/dL    Albumin 3.0 (L) 3.2 - 4.9 g/dL    Total Protein 6.1 6.0 - 8.2 g/dL    Globulin 3.1 1.9 - 3.5 g/dL    A-G Ratio 1.0 g/dL   PROCALCITONIN    Collection Time: 12/10/20  4:59 AM   Result Value Ref Range    Procalcitonin 5.77 (H) <0.25 ng/mL   MAGNESIUM    Collection Time: 12/10/20  4:59 AM   Result Value Ref Range    Magnesium 2.1 1.5 - 2.5 mg/dL   ESTIMATED GFR    Collection Time: 12/10/20  4:59 AM   Result Value Ref Range    GFR If African American >60 >60 mL/min/1.73 m 2    GFR If Non African American >60 >60 mL/min/1.73 m 2   ACCU-CHEK GLUCOSE     Collection Time: 12/10/20  7:36 AM   Result Value Ref Range    Glucose - Accu-Ck 173 (H) 65 - 99 mg/dL   ABG - LAB    Collection Time: 12/10/20  7:48 AM   Result Value Ref Range    Ph 7.50 7.40 - 7.50    Pco2 30.2 26.0 - 37.0 mmHg    Po2 56.4 (L) 64.0 - 87.0 mmHg    O2 Saturation 89.9 (L) 93.0 - 99.0 %    Hco3 23 17 - 25 mmol/L    Base Excess 1 -4 - 3 mmol/L    Body Temp see below Centigrade    FIO2 100 (H) 30 - 60 %   S. Aureus By PCR, Nasal Complete    Collection Time: 12/10/20 10:13 AM    Specimen: Respiratory; Respirate   Result Value Ref Range    Staph aureus by PCR Negative Negative    MRSA by PCR Negative Negative   EKG    Collection Time: 12/10/20  3:21 PM   Result Value Ref Range    Report       [facility]    Test Date:  2020-12-10  Pt Name:    WARD EUBANKS                Department: [facility]  MRN:        [id number]                      Room:       [facility]  Gender:     Male                         Technician: [facility]  :        1965                   Requested By:PORTILLO YUSUF  Order #:    [id number]                    Reading MD: Krish Veloz MD    Measurements  Intervals                                Axis  Rate:       96                           P:          14  PA:         128                          QRS:        12  QRSD:       72                           T:          39  QT:         384  QTc:        486    Interpretive Statements  SINUS RHYTHM  BORDERLINE PROLONGED QT INTERVAL  Compared to ECG 2020 15:49:01  Sinus tachycardia no longer present  Electronically Signed On 12- 17:28:46 PST by Krish Veloz MD     TROPONIN    Collection Time: 12/10/20  3:25 PM   Result Value Ref Range    Troponin T 23 (H) 6 - 19 ng/L   ACCU-CHEK GLUCOSE    Collection Time: 12/10/20  5:19 PM   Result Value Ref Range    Glucose - Accu-Ck 260 (H) 65 - 99 mg/dL   ACCU-CHEK GLUCOSE    Collection Time: 12/10/20  8:49 PM   Result Value Ref Range    Glucose - Accu-Ck 185 (H) 65 - 99 mg/dL    PROCALCITONIN    Collection Time: 12/11/20  7:48 AM   Result Value Ref Range    Procalcitonin 3.42 (H) <0.25 ng/mL   CBC WITH DIFFERENTIAL    Collection Time: 12/11/20  7:48 AM   Result Value Ref Range    WBC 18.8 (H) 4.8 - 10.8 K/uL    RBC 4.51 (L) 4.70 - 6.10 M/uL    Hemoglobin 13.7 (L) 14.0 - 18.0 g/dL    Hematocrit 40.4 (L) 42.0 - 52.0 %    MCV 89.6 81.4 - 97.8 fL    MCH 30.4 27.0 - 33.0 pg    MCHC 33.9 33.7 - 35.3 g/dL    RDW 44.6 35.9 - 50.0 fL    Platelet Count 380 164 - 446 K/uL    MPV 9.0 9.0 - 12.9 fL    Neutrophils-Polys 87.50 (H) 44.00 - 72.00 %    Lymphocytes 4.70 (L) 22.00 - 41.00 %    Monocytes 6.30 0.00 - 13.40 %    Eosinophils 0.00 0.00 - 6.90 %    Basophils 0.20 0.00 - 1.80 %    Immature Granulocytes 1.30 (H) 0.00 - 0.90 %    Nucleated RBC 0.00 /100 WBC    Neutrophils (Absolute) 16.42 (H) 1.82 - 7.42 K/uL    Lymphs (Absolute) 0.89 (L) 1.00 - 4.80 K/uL    Monos (Absolute) 1.19 (H) 0.00 - 0.85 K/uL    Eos (Absolute) 0.00 0.00 - 0.51 K/uL    Baso (Absolute) 0.03 0.00 - 0.12 K/uL    Immature Granulocytes (abs) 0.25 (H) 0.00 - 0.11 K/uL    NRBC (Absolute) 0.00 K/uL   Comp Metabolic Panel    Collection Time: 12/11/20  7:48 AM   Result Value Ref Range    Sodium 137 135 - 145 mmol/L    Potassium 3.7 3.6 - 5.5 mmol/L    Chloride 98 96 - 112 mmol/L    Co2 27 20 - 33 mmol/L    Anion Gap 12.0 7.0 - 16.0    Glucose 189 (H) 65 - 99 mg/dL    Bun 21 8 - 22 mg/dL    Creatinine 1.01 0.50 - 1.40 mg/dL    Calcium 8.5 8.5 - 10.5 mg/dL    AST(SGOT) 51 (H) 12 - 45 U/L    ALT(SGPT) 60 (H) 2 - 50 U/L    Alkaline Phosphatase 166 (H) 30 - 99 U/L    Total Bilirubin 0.7 0.1 - 1.5 mg/dL    Albumin 3.2 3.2 - 4.9 g/dL    Total Protein 6.6 6.0 - 8.2 g/dL    Globulin 3.4 1.9 - 3.5 g/dL    A-G Ratio 0.9 g/dL   CRP QUANTITIVE (NON-CARDIAC)    Collection Time: 12/11/20  7:48 AM   Result Value Ref Range    Stat C-Reactive Protein 6.97 (H) 0.00 - 0.75 mg/dL   D-DIMER    Collection Time: 12/11/20  7:48 AM   Result Value Ref Range     D-Dimer Screen >20.00 (H) 0.00 - 0.50 ug/mL (FEU)   LACTIC ACID    Collection Time: 12/11/20  7:48 AM   Result Value Ref Range    Lactic Acid 2.6 (H) 0.5 - 2.0 mmol/L   ESTIMATED GFR    Collection Time: 12/11/20  7:48 AM   Result Value Ref Range    GFR If African American >60 >60 mL/min/1.73 m 2    GFR If Non African American >60 >60 mL/min/1.73 m 2   ABG - LAB    Collection Time: 12/11/20  8:33 AM   Result Value Ref Range    Ph 7.50 7.40 - 7.50    Pco2 33.2 26.0 - 37.0 mmHg    Po2 49.0 (LL) 64.0 - 87.0 mmHg    O2 Saturation 84.9 (L) 93.0 - 99.0 %    Hco3 25 17 - 25 mmol/L    Base Excess 3 -4 - 3 mmol/L    Body Temp see below Centigrade    FIO2 50 30 - 60 %       Imaging  EC-ECHOCARDIOGRAM LTD W/O CONT   Final Result      DX-CHEST-LIMITED (1 VIEW)   Final Result      No significant change in bilateral pneumonia.      DX-CHEST-LIMITED (1 VIEW)   Final Result         Persistent bilateral pulmonary opacities consistent with Covid 19 pneumonia.      DX-CHEST-PORTABLE (1 VIEW)   Final Result      Bilateral peripheral pulmonary airspace process highly suspicious for Covid pneumonia      US-EXTREMITY VENOUS LOWER BILAT    (Results Pending)       Assessment/Plan  * Pneumonia due to COVID-19 virus  Assessment & Plan  Isolation  Supportive care with HFNC and intubation if needed  Decadron 6 mg qday x10 day  Veklury day 2 of 5  Encourage self-proning    Melena- (present on admission)  Assessment & Plan  Melena x1, H/H stable  PPI  Trend Hg    Elevated d-dimer- (present on admission)  Assessment & Plan  Suspicion for VTE low  Check LE US and echo  Consider anticoagulation if positive, may be difficult with GIB    Agitation- (present on admission)  Assessment & Plan  resolved    Type 2 diabetes mellitus without complication (HCC)- (present on admission)  Assessment & Plan  Goal blood glucose 120-180  sliding scale insulin, accuchecks  hypoglycemia protocol  A1c 7.3      Hyponatremia- (present on admission)  Assessment &  Plan  resolved    Abnormal LFTs  Assessment & Plan  Slowly improving, monitor while on remdesivir    Sepsis (HCC)- (present on admission)  Assessment & Plan  This is Severe Sepsis Present on admission  SIRS criteria identified on my evaluation include: Fever, with temperature greater than 101 deg F, Tachycardia, with heart rate greater than 90 BPM, Tachypnea, with respirations greater than 20 per minute and Leukocytosis, with WBC greater than 12,000  Source of infection is pulmonary  Clinical indicators of end organ dysfunction include Acute respiratory failure as evidenced by a new need for invasive or non-invasive mechanical ventilation (Ventilator or BiPap)   Sepsis protocol initiated  Fluid resuscitation ordered per protocol  IV antibiotics as appropriate for source of sepsis  Reassessment: I have reassessed the patient's hemodynamic status  End organ dysfunction include(s):  Acute respiratory failure    Cover for CAP with Unasyn given elevated procal and inability to r/o concomitant bacterial pathogen  Stop Zyvox, MRSA nares negative    Lactic acidosis- (present on admission)  Assessment & Plan  Mild, likely 2/2 sepsis and WOB  Judicious IVF use given COVID-19    Acute hypoxemic respiratory failure (HCC)- (present on admission)  Assessment & Plan  Secondary to COVID-19 ARDS  HFNC  RT/O2 Protocols  Titrate supplemental FiO2 to maintain SpO2 >85%  Encourage self-proning  Closely monitor for need for MV    Neuropathic pain- (present on admission)  Assessment & Plan  Consider Neurontin if needed      Discussed patient condition and risk of morbidity and/or mortality with Hospitalist, RN, RT, Pharmacy, Code status disscussed, Charge nurse / hot rounds and Patient.    The patient remains critically ill.  Critical care time = 43 minutes in directly providing and coordinating critical care and extensive data review.  No time overlap and excludes procedures.    Please note that this dictation was created using voice  recognition software. I have made every reasonable attempt to correct obvious errors, but I expect that there are errors of grammar and possibly content that I did not discover before finalizing the note.     This patient was seen under COVID 19 pandemic disaster response conditions.  During a disaster, the provisions of care is subject to the Crisis Standard of Care

## 2020-12-11 NOTE — ASSESSMENT & PLAN NOTE
Secondary to COVID-19 ARDS  HOB > 30  Early mobility  Titrate supplemental FiO2 to maintain SpO2 >85%  Encourage self-proning  Aggressive pulmonary hygiene

## 2020-12-11 NOTE — ASSESSMENT & PLAN NOTE
COVID-19 pneumonia  Dexamethasone - Day 4 of 10  Remdesivir - Day 4 of 5  Fluid balance - keep euvolemic  Encourage self-proning  Ampicillin-sulbactam for possible cobacterial infection (procalcitonin 5.77 --> 3.42 --> 1.12) so will complete 5 day course

## 2020-12-11 NOTE — PROGRESS NOTES
Bedside report received and patient care assumed. Pt is sleeping in bed, unable to assess orientation, and is on 60L @ 100% FiO2 with SpO2 88-90%. Breathing is even and unlabored.Tele box on. KRISS wrist restraints on. Bed in lowest position, bed alarm on, and call light within reach. All fall precautions are in place, belongings at bedside table. Hourly rounding initiated.

## 2020-12-11 NOTE — PROGRESS NOTES
Hospital Medicine Daily Progress Note    Date of Service  12/10/2020    Chief Complaint  55 y.o. male admitted 12/8/2020 with   Chief Complaint   Patient presents with   • Shortness of Breath     pt has diarrhea, vomiting, sob, cough the last two weeks.          Hospital Course  This 55-year-old male with a past medical history significant for chronic neuropathy presented to ER on 12/8/920 with a complaint of worsening shortness of breath.  He stated he had loose bowel movement, nausea, vomiting with generalized body ache.    (In ER he is found to be COVID-19 positive, noted to be in respiratory distress, placed on high flow.  Evaluated by intensivist Dr. Greenberg, need monitoring patient in telemetry.    To have lactic acidosis of 8.5 likely secondary to hypoxia versus hypovolemia, continue to trend.  He is also noted to have elevated procalcitonin at 2.12, WBC at 22.2; he was empirically treated with IV antibiotics for superimposed bacterial pneumonia in addition to Covid pneumonia.    Also noted to have elevated liver enzyme with AST/ALT 77/74 trending down to 66/72  He is noted to have Glyco at 7.3    Interval Problem Update  No acute events overnight, patient noted to be respiratory distress, did not want to do self proning, explained the benefit of self/turning into the left side.   --Currently laying on the side, on high flow, continue.  ID consulted for remdesivir, will continue for total of 5 days.   --Continue Decadron 6 mg p.o. daily    12/10:    Patient is noted to be agitated, confused, started on Haldol.  Patient required restraint intermittently.  He has been febrile with a temperature of 100.7.  His psych medication has been restarted.   --- Patient is currently requiring maximum high flow, continue Decadron 6 mg p.o. daily, self proning / turing to his side   Started on restrains   --considering patient oxygen demand, he was started on IV Lasix, antibiotics broadened to Zyvox plus Unasyn.   Monitor    Consultants/Specialty  Intensivist    Code Status  Full Code    Disposition  To be Determined    Review of Systems  Review of Systems   Constitutional: Positive for chills, fever and malaise/fatigue. Negative for diaphoresis.   HENT: Positive for hearing loss. Negative for congestion and sore throat.    Eyes: Negative for blurred vision and discharge.   Respiratory: Positive for cough and shortness of breath. Negative for hemoptysis and sputum production.    Cardiovascular: Positive for palpitations. Negative for chest pain and orthopnea.   Gastrointestinal: Negative for abdominal pain, diarrhea, heartburn and nausea.   Genitourinary: Negative for dysuria.   Musculoskeletal: Positive for myalgias. Negative for neck pain.   Neurological: Negative for dizziness and weakness.   Psychiatric/Behavioral: The patient is nervous/anxious.         Physical Exam  Temp:  [36.9 °C (98.5 °F)-38.2 °C (100.7 °F)] 38 °C (100.4 °F)  Pulse:  [] 90  Resp:  [22-60] 22  BP: ()/(58-96) 120/71  SpO2:  [82 %-95 %] 88 %    Physical Exam  Vitals signs and nursing note reviewed.   Constitutional:       Appearance: Normal appearance.   HENT:      Head: Normocephalic and atraumatic.   Eyes:      Extraocular Movements: Extraocular movements intact.   Neck:      Musculoskeletal: Neck supple.   Cardiovascular:      Rate and Rhythm: Normal rate.      Pulses: Normal pulses.      Heart sounds: No murmur.   Pulmonary:      Effort: Respiratory distress present.      Breath sounds: Rales present.      Comments:  Poor and shallow breath sounds  Abdominal:      General: Abdomen is flat. There is no distension.      Palpations: Abdomen is soft.   Musculoskeletal:         General: No swelling.      Right lower leg: No edema.      Left lower leg: No edema.   Skin:     General: Skin is warm.   Neurological:      Mental Status: He is alert and oriented to person, place, and time.      Cranial Nerves: No cranial nerve deficit.    Psychiatric:         Mood and Affect: Mood normal.      Comments: Agitated and restless         Fluids    Intake/Output Summary (Last 24 hours) at 12/10/2020 1855  Last data filed at 12/10/2020 1842  Gross per 24 hour   Intake 1000 ml   Output 1500 ml   Net -500 ml       Laboratory  Recent Labs     12/08/20  1532 12/09/20  0127 12/10/20  0459   WBC 22.2* 15.2* 20.0*   RBC 4.53* 4.39* 4.06*   HEMOGLOBIN 13.5* 13.0* 12.2*   HEMATOCRIT 39.1* 37.6* 35.7*   MCV 86.3 85.6 87.9   MCH 29.8 29.6 30.0   MCHC 34.5 34.6 34.2   RDW 40.3 40.5 44.0   PLATELETCT 278 261 336   MPV 9.2 9.2 8.7*     Recent Labs     12/09/20  0127 12/09/20  0519 12/10/20  0459   SODIUM 129* 130* 134*   POTASSIUM 4.1 3.8 3.5*   CHLORIDE 94* 97 98   CO2 24 25 25   GLUCOSE 221* 221* 166*   BUN 12 13 15   CREATININE 0.86 0.84 0.86   CALCIUM 8.5 8.5 8.3*     Recent Labs     12/08/20  1532   INR 0.94               Imaging  DX-CHEST-LIMITED (1 VIEW)   Final Result         Persistent bilateral pulmonary opacities consistent with Covid 19 pneumonia.      DX-CHEST-PORTABLE (1 VIEW)   Final Result      Bilateral peripheral pulmonary airspace process highly suspicious for Covid pneumonia      EC-ECHOCARDIOGRAM COMPLETE W/O CONT    (Results Pending)        Assessment/Plan  * Pneumonia due to COVID-19 virus  Assessment & Plan  Patient is on high flow, will continue Decadron 6 mg p.o. daily, continue remdesivir.   -- continue incentive spirometer, provide Zn and ascorbic acid   --Encourage proning    Patient does have superimposed bacterial pneumonia, will continue Unasyn plus azithromycin   -Dimer less than three, no DVT study indicated      Agitation  Assessment & Plan  Patient noted to be agitated, will continue his home psych medication.  Currently patient is in restraints    Leukocytosis  Assessment & Plan  At 20s ,  Likely 2/2 steroid     Type 2 diabetes mellitus without complication (HCC)  Assessment & Plan  Glyco at 7.2, continue hypoglycemia protocol,  accu-checks AC and HS    -- diabetic diet    Hyponatremia  Assessment & Plan  Hypovolemic hyponatremia, today sodium 134, repeat BMP at a.m.    Abnormal LFTs  Assessment & Plan  Suspect due to covid 19   Cont to trend    Sepsis (HCC)  Assessment & Plan  This is Severe Sepsis Present on admission  SIRS criteria identified on my evaluation include: Tachycardia, with heart rate greater than 90 BPM, Tachypnea, with respirations greater than 20 per minute and Leukocytosis, with WBC greater than 12,000  Source of infection is pulmonary  Clinical indicators of end organ dysfunction include Lactate >2 mmol/L (18.0 mg/dL)  Sepsis protocol initiated  Fluid resuscitation ordered per protocol  IV antibiotics as appropriate for source of sepsis  Reassessment: I have reassessed the patient's hemodynamic status  End organ dysfunction include(s):  Acute respiratory failure       Suspect likely pulmonary due to covid 19    -Empiric IV abx   -- Titarte O2 as needed    Lactic acidosis  Assessment & Plan  Severe at 8.5 suspect likely due to hypoxia and mild hypovolemia    ---Trend and now resolved    Acute hypoxemic respiratory failure (HCC)  Assessment & Plan  Likely  secondary to Covid pneumonia superimposed to the patient with pneumonia   --- We will continue Decadron 6 mg p.o. daily, remdesivir   --- Encourage incentive spirometer, self proning, titrate oxygen as needed  Low threshold of transferring to iCU    Neuropathic pain- (present on admission)  Assessment & Plan  Chronic hx of, has been without medications for over a year   - continue gabapentin       VTE prophylaxis: Lovenox

## 2020-12-11 NOTE — HOSPITAL COURSE
Chief Complaint  55 y.o. male with a pmhx of LE neuropathy who presented 12/8/2020 with dyspnea. These symptoms started 9 days prior to presentation and were associated with N/V/D and malaise. He was found to have COVID-19 and started on HFNC, he was evaluated and determined to be best suited for telemetry. He was started on remdesivir and decadron. Today his dyspnea worsened as did his hypoxia. Repeat labs were obtained which showed an improving CRP and PCT but a worsened D-dimer. The patient has no signs of VTE, LE US and echo have been ordered. ABG showed a worsened P:F ratio. At this time the patient denies pain or severe dyspnea. He was noted to have a melenotic BM prior to transfer but denies emesis.    Hospital Course  12/11 transfer to ICU for increasing O2 requirement; ,melanotic stool now on PPI  12/12 continues to require HFNC + NRB; no further melanotic stools; start diet; start enoxaparin

## 2020-12-11 NOTE — PROGRESS NOTES
Monitor summary:      Rhythm: SR/ST  Rate:   Ectopy: none  Measurements: .16/.08/.48      12hr chart check

## 2020-12-12 LAB
ALBUMIN SERPL BCP-MCNC: 3.3 G/DL (ref 3.2–4.9)
ALBUMIN/GLOB SERPL: 1 G/DL
ALP SERPL-CCNC: 177 U/L (ref 30–99)
ALT SERPL-CCNC: 51 U/L (ref 2–50)
ANION GAP SERPL CALC-SCNC: 14 MMOL/L (ref 7–16)
AST SERPL-CCNC: 48 U/L (ref 12–45)
BASOPHILS # BLD AUTO: 0.2 % (ref 0–1.8)
BASOPHILS # BLD: 0.03 K/UL (ref 0–0.12)
BILIRUB SERPL-MCNC: 0.8 MG/DL (ref 0.1–1.5)
BUN SERPL-MCNC: 23 MG/DL (ref 8–22)
CALCIUM SERPL-MCNC: 8.5 MG/DL (ref 8.5–10.5)
CHLORIDE SERPL-SCNC: 100 MMOL/L (ref 96–112)
CO2 SERPL-SCNC: 26 MMOL/L (ref 20–33)
CREAT SERPL-MCNC: 0.86 MG/DL (ref 0.5–1.4)
EOSINOPHIL # BLD AUTO: 0 K/UL (ref 0–0.51)
EOSINOPHIL NFR BLD: 0 % (ref 0–6.9)
ERYTHROCYTE [DISTWIDTH] IN BLOOD BY AUTOMATED COUNT: 43.9 FL (ref 35.9–50)
GLOBULIN SER CALC-MCNC: 3.4 G/DL (ref 1.9–3.5)
GLUCOSE BLD-MCNC: 145 MG/DL (ref 65–99)
GLUCOSE BLD-MCNC: 179 MG/DL (ref 65–99)
GLUCOSE BLD-MCNC: 220 MG/DL (ref 65–99)
GLUCOSE BLD-MCNC: 233 MG/DL (ref 65–99)
GLUCOSE BLD-MCNC: 321 MG/DL (ref 65–99)
GLUCOSE SERPL-MCNC: 144 MG/DL (ref 65–99)
HCT VFR BLD AUTO: 42.6 % (ref 42–52)
HGB BLD-MCNC: 14 G/DL (ref 14–18)
IMM GRANULOCYTES # BLD AUTO: 0.35 K/UL (ref 0–0.11)
IMM GRANULOCYTES NFR BLD AUTO: 1.9 % (ref 0–0.9)
LYMPHOCYTES # BLD AUTO: 0.92 K/UL (ref 1–4.8)
LYMPHOCYTES NFR BLD: 5 % (ref 22–41)
MAGNESIUM SERPL-MCNC: 2.2 MG/DL (ref 1.5–2.5)
MCH RBC QN AUTO: 29.7 PG (ref 27–33)
MCHC RBC AUTO-ENTMCNC: 32.9 G/DL (ref 33.7–35.3)
MCV RBC AUTO: 90.4 FL (ref 81.4–97.8)
MONOCYTES # BLD AUTO: 0.68 K/UL (ref 0–0.85)
MONOCYTES NFR BLD AUTO: 3.7 % (ref 0–13.4)
NEUTROPHILS # BLD AUTO: 16.52 K/UL (ref 1.82–7.42)
NEUTROPHILS NFR BLD: 89.2 % (ref 44–72)
NRBC # BLD AUTO: 0 K/UL
NRBC BLD-RTO: 0 /100 WBC
PHOSPHATE SERPL-MCNC: 3.1 MG/DL (ref 2.5–4.5)
PLATELET # BLD AUTO: 388 K/UL (ref 164–446)
PMV BLD AUTO: 8.9 FL (ref 9–12.9)
POTASSIUM SERPL-SCNC: 3.3 MMOL/L (ref 3.6–5.5)
PROCALCITONIN SERPL-MCNC: 1.12 NG/ML
PROT SERPL-MCNC: 6.7 G/DL (ref 6–8.2)
RBC # BLD AUTO: 4.71 M/UL (ref 4.7–6.1)
SODIUM SERPL-SCNC: 140 MMOL/L (ref 135–145)
WBC # BLD AUTO: 18.5 K/UL (ref 4.8–10.8)

## 2020-12-12 PROCEDURE — 700102 HCHG RX REV CODE 250 W/ 637 OVERRIDE(OP): Performed by: EMERGENCY MEDICINE

## 2020-12-12 PROCEDURE — 99291 CRITICAL CARE FIRST HOUR: CPT | Performed by: EMERGENCY MEDICINE

## 2020-12-12 PROCEDURE — A9270 NON-COVERED ITEM OR SERVICE: HCPCS | Performed by: HOSPITALIST

## 2020-12-12 PROCEDURE — 700102 HCHG RX REV CODE 250 W/ 637 OVERRIDE(OP): Performed by: HOSPITALIST

## 2020-12-12 PROCEDURE — 770022 HCHG ROOM/CARE - ICU (200)

## 2020-12-12 PROCEDURE — 84145 PROCALCITONIN (PCT): CPT

## 2020-12-12 PROCEDURE — 84100 ASSAY OF PHOSPHORUS: CPT

## 2020-12-12 PROCEDURE — 700105 HCHG RX REV CODE 258: Performed by: HOSPITALIST

## 2020-12-12 PROCEDURE — 83735 ASSAY OF MAGNESIUM: CPT

## 2020-12-12 PROCEDURE — 94640 AIRWAY INHALATION TREATMENT: CPT

## 2020-12-12 PROCEDURE — 700111 HCHG RX REV CODE 636 W/ 250 OVERRIDE (IP): Performed by: EMERGENCY MEDICINE

## 2020-12-12 PROCEDURE — 700102 HCHG RX REV CODE 250 W/ 637 OVERRIDE(OP): Performed by: INTERNAL MEDICINE

## 2020-12-12 PROCEDURE — 82962 GLUCOSE BLOOD TEST: CPT | Mod: 91

## 2020-12-12 PROCEDURE — A9270 NON-COVERED ITEM OR SERVICE: HCPCS | Performed by: EMERGENCY MEDICINE

## 2020-12-12 PROCEDURE — 700111 HCHG RX REV CODE 636 W/ 250 OVERRIDE (IP): Performed by: HOSPITALIST

## 2020-12-12 PROCEDURE — 80053 COMPREHEN METABOLIC PANEL: CPT

## 2020-12-12 PROCEDURE — 85025 COMPLETE CBC W/AUTO DIFF WBC: CPT

## 2020-12-12 PROCEDURE — A9270 NON-COVERED ITEM OR SERVICE: HCPCS | Performed by: INTERNAL MEDICINE

## 2020-12-12 PROCEDURE — 700105 HCHG RX REV CODE 258: Performed by: INTERNAL MEDICINE

## 2020-12-12 PROCEDURE — 700101 HCHG RX REV CODE 250: Performed by: INTERNAL MEDICINE

## 2020-12-12 RX ORDER — POTASSIUM CHLORIDE 20 MEQ/1
60 TABLET, EXTENDED RELEASE ORAL ONCE
Status: COMPLETED | OUTPATIENT
Start: 2020-12-12 | End: 2020-12-12

## 2020-12-12 RX ADMIN — FUROSEMIDE 20 MG: 10 INJECTION, SOLUTION INTRAMUSCULAR; INTRAVENOUS at 17:05

## 2020-12-12 RX ADMIN — INSULIN HUMAN 2 UNITS: 100 INJECTION, SOLUTION PARENTERAL at 12:36

## 2020-12-12 RX ADMIN — REMDESIVIR 100 MG: 100 INJECTION, POWDER, LYOPHILIZED, FOR SOLUTION INTRAVENOUS at 18:32

## 2020-12-12 RX ADMIN — OMEPRAZOLE 20 MG: 20 CAPSULE, DELAYED RELEASE ORAL at 06:27

## 2020-12-12 RX ADMIN — GABAPENTIN 400 MG: 400 CAPSULE ORAL at 17:05

## 2020-12-12 RX ADMIN — INSULIN HUMAN 2 UNITS: 100 INJECTION, SOLUTION PARENTERAL at 20:53

## 2020-12-12 RX ADMIN — DEXAMETHASONE 6 MG: 4 TABLET ORAL at 06:27

## 2020-12-12 RX ADMIN — AMPICILLIN AND SULBACTAM 3 G: 2; 1 INJECTION, POWDER, FOR SOLUTION INTRAVENOUS at 17:05

## 2020-12-12 RX ADMIN — FUROSEMIDE 20 MG: 10 INJECTION, SOLUTION INTRAMUSCULAR; INTRAVENOUS at 06:27

## 2020-12-12 RX ADMIN — INSULIN HUMAN 4 UNITS: 100 INJECTION, SOLUTION PARENTERAL at 17:21

## 2020-12-12 RX ADMIN — AMPICILLIN AND SULBACTAM 3 G: 2; 1 INJECTION, POWDER, FOR SOLUTION INTRAVENOUS at 12:18

## 2020-12-12 RX ADMIN — GABAPENTIN 400 MG: 400 CAPSULE ORAL at 06:27

## 2020-12-12 RX ADMIN — POTASSIUM CHLORIDE 60 MEQ: 1500 TABLET, EXTENDED RELEASE ORAL at 10:27

## 2020-12-12 RX ADMIN — AMPICILLIN AND SULBACTAM 3 G: 2; 1 INJECTION, POWDER, FOR SOLUTION INTRAVENOUS at 06:28

## 2020-12-12 RX ADMIN — ENOXAPARIN SODIUM 40 MG: 40 INJECTION SUBCUTANEOUS at 10:27

## 2020-12-12 RX ADMIN — AMPICILLIN AND SULBACTAM 3 G: 2; 1 INJECTION, POWDER, FOR SOLUTION INTRAVENOUS at 23:08

## 2020-12-12 RX ADMIN — ESCITALOPRAM OXALATE 10 MG: 10 TABLET ORAL at 06:27

## 2020-12-12 ASSESSMENT — ENCOUNTER SYMPTOMS
SHORTNESS OF BREATH: 1
HEADACHES: 0
VOMITING: 0
NAUSEA: 0
ABDOMINAL PAIN: 0

## 2020-12-12 ASSESSMENT — PAIN DESCRIPTION - PAIN TYPE
TYPE: ACUTE PAIN

## 2020-12-12 NOTE — PROGRESS NOTES
Critical Care Progress Note  Crisis Standards of Care Documentation    Date of admission  12/8/2020    Chief Complaint  55 y.o. male with a pmhx of LE neuropathy who presented 12/8/2020 with dyspnea. These symptoms started 9 days prior to presentation and were associated with N/V/D and malaise. He was found to have COVID-19 and started on HFNC, he was evaluated and determined to be best suited for telemetry. He was started on remdesivir and decadron. Today his dyspnea worsened as did his hypoxia. Repeat labs were obtained which showed an improving CRP and PCT but a worsened D-dimer. The patient has no signs of VTE, LE US and echo have been ordered. ABG showed a worsened P:F ratio. At this time the patient denies pain or severe dyspnea. He was noted to have a melenotic BM prior to transfer but denies emesis.    Hospital Course  12/11 transfer to ICU for increasing O2 requirement; ,melanotic stool now on PPI    Review of Systems  Review of Systems   Constitutional: Positive for malaise/fatigue.   Respiratory: Positive for shortness of breath.    Cardiovascular: Negative for chest pain.   Gastrointestinal: Negative for abdominal pain, nausea and vomiting.   Skin: Negative.    Neurological: Negative for headaches.   All other systems reviewed and are negative.       Vital Signs for last 24 hours   Temp:  [36.1 °C (96.9 °F)-36.6 °C (97.9 °F)] 36.3 °C (97.4 °F)  Pulse:  [59-92] 71  Resp:  [5-39] 27  BP: ()/(58-87) 131/76  SpO2:  [84 %-92 %] 87 %    Hemodynamic parameters for last 24 hours       Respiratory Information for the last 24 hours       Physical Exam   Physical Exam  Vitals signs and nursing note reviewed.   Constitutional:       Appearance: He is ill-appearing.   HENT:      Head: Normocephalic and atraumatic.      Right Ear: External ear normal.      Left Ear: External ear normal.      Nose: Nose normal.      Mouth/Throat:      Mouth: Mucous membranes are moist.      Pharynx: Oropharynx is clear.   Eyes:       Pupils: Pupils are equal, round, and reactive to light.   Neck:      Musculoskeletal: Normal range of motion and neck supple.   Cardiovascular:      Rate and Rhythm: Normal rate and regular rhythm.      Pulses: Normal pulses.   Pulmonary:      Comments: Coarse bilateral breath sounds with increased work of breathing  Abdominal:      Palpations: Abdomen is soft.   Musculoskeletal: Normal range of motion.   Skin:     General: Skin is warm and dry.      Capillary Refill: Capillary refill takes less than 2 seconds.   Neurological:      General: No focal deficit present.      Mental Status: He is alert.         Medications  Current Facility-Administered Medications   Medication Dose Route Frequency Provider Last Rate Last Admin   • omeprazole (PRILOSEC) capsule 20 mg  20 mg Oral DAILY Jax Schroeder Jr., D.O.   20 mg at 12/11/20 1142   • haloperidol lactate (HALDOL) injection 2-5 mg  2-5 mg Intravenous Q4HRS PRN Rylie Darden M.D.   5 mg at 12/10/20 1621   • furosemide (LASIX) injection 20 mg  20 mg Intravenous BID DIURETIC Mireille Wyatt M.D.   20 mg at 12/11/20 1643   • oxyCODONE immediate-release (ROXICODONE) tablet 5 mg  5 mg Oral Q4HRS PRN Mireille Wyatt M.D.       • escitalopram (Lexapro) tablet 10 mg  10 mg Oral Daily-0600 Mireille Wyatt M.D.   10 mg at 12/11/20 0507   • gabapentin (NEURONTIN) capsule 400 mg  400 mg Oral BID Mireille Wyatt M.D.   400 mg at 12/11/20 0507   • remdesivir (Veklury) 100 mg in  mL IVPB  100 mg Intravenous DAILY AT 1800 Nicole Henry M.D.   Stopped at 12/10/20 1942   • insulin regular (HumuLIN R,NovoLIN R) injection  1-6 Units Subcutaneous 4X/DAY KAREN Wyatt M.D.   2 Units at 12/11/20 1645    And   • glucose 4 g chewable tablet 16 g  16 g Oral Q15 MIN PRN Mireille Wyatt M.D.        And   • dextrose 50% (D50W) injection 50 mL  50 mL Intravenous Q15 MIN PRN Mireille Wyatt M.D.       • ondansetron (ZOFRAN) syringe/vial injection 4 mg  4 mg Intravenous Q6HRS PRN  Asif Villafuerte M.D.       • ondansetron (ZOFRAN ODT) dispertab 4 mg  4 mg Oral Q6HRS PRN Asif Villafuerte M.D.       • acetaminophen (Tylenol) tablet 650 mg  650 mg Oral Q6HRS PRN Asif Villafuerte M.D.   650 mg at 12/09/20 1416   • Respiratory Therapy Consult   Nebulization Continuous RT Asif Villafuerte M.D.       • ampicillin/sulbactam (UNASYN) 3 g in  mL IVPB  3 g Intravenous Q6HRS Asif Villafuerte M.D.   Stopped at 12/11/20 1209   • dexamethasone (DECADRON) tablet 6 mg  6 mg Oral DAILY Asif Villafuerte M.D.   6 mg at 12/11/20 0507       Fluids    Intake/Output Summary (Last 24 hours) at 12/11/2020 1703  Last data filed at 12/11/2020 1600  Gross per 24 hour   Intake 1270 ml   Output 2750 ml   Net -1480 ml       Laboratory  Recent Labs     12/10/20  0748 12/11/20  0833 12/11/20  1014   GDIUK10H 7.50 7.50  --    [id number] 30.2 33.2  --    [id number] 56.4* 49.0*  --    [id number] 89.9* 84.9*  --    ARTHCO3 23 25  --    FIO2 100* 50  --    ARTBE 1 3  --    ISTATAPH  --   --  7.502*   ISTATAPCO2  --   --  35.9   ISTATAPO2  --   --  50*   ISTATATCO2  --   --  29   [id number]  --   --  88*   ISTATARTHCO3  --   --  28.1*   ISTATARTBE  --   --  5*   ISTATTEMP  --   --  96.9 F   ISTATFIO2  --   --  100   ISTATSPEC  --   --  Arterial   ISTATAPHTC  --   --  7.517*   [id number]  --   --  47*         Recent Labs     12/09/20  0519 12/10/20  0459 12/11/20  0748   SODIUM 130* 134* 137   POTASSIUM 3.8 3.5* 3.7   CHLORIDE 97 98 98   CO2 25 25 27   BUN 13 15 21   CREATININE 0.84 0.86 1.01   MAGNESIUM  --  2.1  --    CALCIUM 8.5 8.3* 8.5     Recent Labs     12/09/20  0519 12/09/20  0828 12/10/20  0459 12/11/20  0748   ALTSGPT  --  72* 66* 60*   ASTSGOT  --  64* 62* 51*   ALKPHOSPHAT  --  167* 167* 166*   TBILIRUBIN  --  0.6 0.6 0.7   DBILIRUBIN  --  0.3  --   --    GLUCOSE 221*  --  166* 189*     Recent Labs     12/09/20  0127 12/09/20  0828 12/10/20  0459 12/11/20  0748   WBC 15.2*  --  20.0* 18.8*   NEUTSPOLYS 89.50*   --  89.40* 87.50*   LYMPHOCYTES 4.60*  --  3.70* 4.70*   MONOCYTES 5.10  --  5.90 6.30   EOSINOPHILS 0.00  --  0.00 0.00   BASOPHILS 0.10  --  0.10 0.20   ASTSGOT 77* 64* 62* 51*   ALTSGPT 74* 72* 66* 60*   ALKPHOSPHAT 162* 167* 167* 166*   TBILIRUBIN 0.7 0.6 0.6 0.7     Recent Labs     12/09/20  0127 12/10/20  0459 12/11/20  0748 12/11/20  1030   RBC 4.39* 4.06* 4.51*  --    HEMOGLOBIN 13.0* 12.2* 13.7* 14.0   HEMATOCRIT 37.6* 35.7* 40.4*  --    PLATELETCT 261 336 380  --        Imaging  X-Ray:  I have personally reviewed the images and compared with prior images.    Assessment/Plan  * Pneumonia due to COVID-19 virus  Assessment & Plan  COVID-19 pneumonia  Dexamethasone - Day 3 of 10  Remdesivir - Day 3 of 5  Fluid balance - keep euvolemic  Encourage self-proning  Ampicillin-sulbactam for possible cobacterial infection (procalcitonin 3.42)    Melena- (present on admission)  Assessment & Plan  Melena x1, H/H stable  Omeprazole  Serial CBC    Elevated d-dimer- (present on admission)  Assessment & Plan  D-Dimer > 20  Lower extremity doppler negative for VTE    Abnormal LFTs  Assessment & Plan  Slowly improving, monitor while on remdesivir    Sepsis (HCC)- (present on admission)  Assessment & Plan  This is Severe Sepsis Present on admission  SIRS criteria identified on my evaluation include: Fever, with temperature greater than 101 deg F, Tachycardia, with heart rate greater than 90 BPM, Tachypnea, with respirations greater than 20 per minute and Leukocytosis, with WBC greater than 12,000  Source of infection is pulmonary  Clinical indicators of end organ dysfunction include Acute respiratory failure as evidenced by a new need for invasive or non-invasive mechanical ventilation (Ventilator or BiPap)   Sepsis protocol initiated  Fluid resuscitation ordered per protocol  IV antibiotics as appropriate for source of sepsis  Reassessment: I have reassessed the patient's hemodynamic status  End organ dysfunction include(s):   Acute respiratory failure    Fluid resuscitation - judicious fluids given COVID-19  Antimicrobials -ampicillin-sulbactam    Lactic acidosis- (present on admission)  Assessment & Plan  Mild, likely 2/2 sepsis and WOB  Judicious IVF use given COVID-19    Acute hypoxemic respiratory failure (HCC)- (present on admission)  Assessment & Plan  Secondary to COVID-19 ARDS  HOB > 30  Early mobility  Titrate supplemental FiO2 to maintain SpO2 >85%  Encourage self-proning  Aggressive pulmonary hygiene      Neuropathic pain- (present on admission)  Assessment & Plan  Consider gabapentin if needed     DVT prophylaxis: No chemical DVT prophylaxis secondary to melanotic stool  PUD prophylaxis: Omeprazole  Glycemic control: Sliding scale insulin  Nutrition: NPO  Lines: None  Bennett: None  Mobility: Early mobility as tolerated  Goals of care: Full code  Disposition: ICU    I have performed a physical exam and reviewed and updated ROS and Plan today (12/11/2020). In review of yesterday's note (12/10/2020), there are no changes except as documented above.     Discussed patient condition and risk of morbidity and/or mortality with Hospitalist, RN, RT, Pharmacy, Charge nurse / hot rounds and Patient  The patient remains critically ill.  Critical care time = 45 minutes in directly providing and coordinating critical care and extensive data review.  No time overlap and excludes procedures.

## 2020-12-12 NOTE — PROGRESS NOTES
Critical Care Progress Note  Crisis Standards of Care Documentation    Date of admission  12/8/2020    Chief Complaint  55 y.o. male with a pmhx of LE neuropathy who presented 12/8/2020 with dyspnea. These symptoms started 9 days prior to presentation and were associated with N/V/D and malaise. He was found to have COVID-19 and started on HFNC, he was evaluated and determined to be best suited for telemetry. He was started on remdesivir and decadron. Today his dyspnea worsened as did his hypoxia. Repeat labs were obtained which showed an improving CRP and PCT but a worsened D-dimer. The patient has no signs of VTE, LE US and echo have been ordered. ABG showed a worsened P:F ratio. At this time the patient denies pain or severe dyspnea. He was noted to have a melenotic BM prior to transfer but denies emesis.    Hospital Course  12/11 transfer to ICU for increasing O2 requirement; ,melanotic stool now on PPI  12/12 continues to require HFNC + NRB; no further melanotic stools; start diet; start enoxaparin    Review of Systems  Review of Systems   Constitutional: Positive for malaise/fatigue.   Respiratory: Positive for shortness of breath.    Cardiovascular: Negative for chest pain.   Gastrointestinal: Negative for abdominal pain, nausea and vomiting.   Skin: Negative.    Neurological: Negative for headaches.   All other systems reviewed and are negative.       Vital Signs for last 24 hours   Temp:  [36.3 °C (97.4 °F)-37 °C (98.6 °F)] 36.9 °C (98.5 °F)  Pulse:  [] 104  Resp:  [5-39] 33  BP: (104-153)/() 152/90  SpO2:  [83 %-95 %] 93 %    Hemodynamic parameters for last 24 hours       Respiratory Information for the last 24 hours       Physical Exam   Physical Exam  Vitals signs and nursing note reviewed.   Constitutional:       Appearance: He is ill-appearing.   HENT:      Head: Normocephalic and atraumatic.      Right Ear: External ear normal.      Left Ear: External ear normal.      Nose: Nose normal.       Mouth/Throat:      Mouth: Mucous membranes are moist.      Pharynx: Oropharynx is clear.   Eyes:      Pupils: Pupils are equal, round, and reactive to light.   Neck:      Musculoskeletal: Normal range of motion and neck supple.   Cardiovascular:      Rate and Rhythm: Normal rate and regular rhythm.      Pulses: Normal pulses.   Pulmonary:      Comments: Coarse bilateral breath sounds with increased work of breathing  Abdominal:      Palpations: Abdomen is soft.   Musculoskeletal: Normal range of motion.   Skin:     General: Skin is warm and dry.      Capillary Refill: Capillary refill takes less than 2 seconds.   Neurological:      General: No focal deficit present.      Mental Status: He is alert.         Medications  Current Facility-Administered Medications   Medication Dose Route Frequency Provider Last Rate Last Admin   • MD Alert...ICU Electrolyte Replacement per Pharmacy   Other PHARMACY TO DOSE Dg Grant M.D.       • enoxaparin (LOVENOX) inj 40 mg  40 mg Subcutaneous DAILY Dg Grant M.D.   40 mg at 12/12/20 1027   • omeprazole (PRILOSEC) capsule 20 mg  20 mg Oral DAILY Jax Schroeder Jr., D.O.   20 mg at 12/12/20 0627   • haloperidol lactate (HALDOL) injection 2-5 mg  2-5 mg Intravenous Q4HRS PRN Rylie Darden M.D.   5 mg at 12/10/20 1621   • furosemide (LASIX) injection 20 mg  20 mg Intravenous BID DIURETIC Mireille Wyatt M.D.   20 mg at 12/12/20 0627   • oxyCODONE immediate-release (ROXICODONE) tablet 5 mg  5 mg Oral Q4HRS PRN iMreille Wyatt M.D.       • escitalopram (Lexapro) tablet 10 mg  10 mg Oral Daily-0600 Mireille Wyatt M.D.   10 mg at 12/12/20 0627   • gabapentin (NEURONTIN) capsule 400 mg  400 mg Oral BID Mireille Wyatt M.D.   400 mg at 12/12/20 0627   • remdesivir (Veklury) 100 mg in  mL IVPB  100 mg Intravenous DAILY AT 1800 Nicole Henry M.D.   Stopped at 12/11/20 2153   • insulin regular (HumuLIN R,NovoLIN R) injection  1-6 Units [facility]cutaneous [facility]  Mireille Wyatt M.D.   Stopped at 12/12/20 0700    And   • glucose 4 g chewable tablet 16 g  16 g Oral Q15 MIN PRN Mireille Wyatt M.D.        And   • dextrose 50% (D50W) injection 50 mL  50 mL Intravenous Q15 MIN PRN Mireille Wyatt M.D.       • ondansetron (ZOFRAN) syringe/vial injection 4 mg  4 mg Intravenous Q6HRS PRN Asif Villafuerte M.D.       • ondansetron (ZOFRAN ODT) dispertab 4 mg  4 mg Oral Q6HRS PRN Asif Villafuerte M.D.       • acetaminophen (Tylenol) tablet 650 mg  650 mg Oral Q6HRS PRN Asif Villafuerte M.D.   650 mg at 12/09/20 1416   • Respiratory Therapy Consult   Nebulization Continuous RT Asif Villafuerte M.D.       • ampicillin/sulbactam (UNASYN) 3 g in  mL IVPB  3 g Intravenous Q6HRS Asif Villafuerte M.D.   Stopped at 12/12/20 0704   • dexamethasone (DECADRON) tablet 6 mg  6 mg Oral DAILY Asif Villafuerte M.D.   6 mg at 12/12/20 0627       Fluids    Intake/Output Summary (Last 24 hours) at 12/12/2020 1133  Last data filed at 12/12/2020 0900  Gross per 24 hour   Intake 1273.33 ml   Output 2885 ml   Net -1611.67 ml       Laboratory  Recent Labs     12/10/20  0748 12/11/20  0833 12/11/20  1014   OCLCG04U 7.50 7.50  --    [id number] 30.2 33.2  --    [id number] 56.4* 49.0*  --    HIQK5EOE 89.9* 84.9*  --    ARTHCO3 23 25  --    FIO2 100* 50  --    ARTBE 1 3  --    ISTATAPH  --   --  7.502*   ISTATAPCO2  --   --  35.9   ISTATAPO2  --   --  50*   ISTATATCO2  --   --  29   [id number]  --   --  88*   ISTATARTHCO3  --   --  28.1*   ISTATARTBE  --   --  5*   ISTATTEMP  --   --  96.9 F   ISTATFIO2  --   --  100   ISTATSPEC  --   --  Arterial   ISTATAPHTC  --   --  7.517*   [id number]  --   --  47*         Recent Labs     12/10/20  0459 12/11/20  0748 12/12/20  0640 12/12/20  0650   SODIUM 134* 137 140  --    POTASSIUM 3.5* 3.7 3.3*  --    CHLORIDE 98 98 100  --    CO2 25 27 26  --    BUN 15 21 23*  --    CREATININE 0.86 1.01 0.86  --    MAGNESIUM 2.1  --  2.2  --    PHOSPHORUS  --   --   --  3.1    CALCIUM 8.3* 8.5 8.5  --      Recent Labs     12/10/20  0459 12/11/20  0748 12/12/20  0640   ALTSGPT 66* 60* 51*   ASTSGOT 62* 51* 48*   ALKPHOSPHAT 167* 166* 177*   TBILIRUBIN 0.6 0.7 0.8   GLUCOSE 166* 189* 144*     Recent Labs     12/10/20  0459 12/11/20  0748 12/12/20  0425 12/12/20  0640   WBC 20.0* 18.8* 18.5*  --    NEUTSPOLYS 89.40* 87.50* 89.20*  --    LYMPHOCYTES 3.70* 4.70* 5.00*  --    MONOCYTES 5.90 6.30 3.70  --    EOSINOPHILS 0.00 0.00 0.00  --    BASOPHILS 0.10 0.20 0.20  --    ASTSGOT 62* 51*  --  48*   ALTSGPT 66* 60*  --  51*   ALKPHOSPHAT 167* 166*  --  177*   TBILIRUBIN 0.6 0.7  --  0.8     Recent Labs     12/10/20  0459 12/11/20  0748 12/11/20  1030 12/11/20  1730 12/12/20  0425   RBC 4.06* 4.51*  --   --  4.71   HEMOGLOBIN 12.2* 13.7* 14.0 13.7* 14.0   HEMATOCRIT 35.7* 40.4*  --   --  42.6   PLATELETCT 336 380  --   --  388       Imaging  X-Ray:  I have personally reviewed the images and compared with prior images.    Assessment/Plan  * Pneumonia due to COVID-19 virus  Assessment & Plan  COVID-19 pneumonia  Dexamethasone - Day 4 of 10  Remdesivir - Day 4 of 5  Fluid balance - keep euvolemic  Encourage self-proning  Ampicillin-sulbactam for possible cobacterial infection (procalcitonin 5.77 --> 3.42 --> 1.12) so will complete 5 day course    Melena- (present on admission)  Assessment & Plan  Melena x1, H/H stable  Omeprazole  Start diet  Start DVT prophylaxis    Elevated d-dimer- (present on admission)  Assessment & Plan  D-Dimer > 20  Lower extremity doppler negative for VTE  TTE normal with LVEF 70%    Abnormal LFTs  Assessment & Plan  Slowly improving, monitor while on remdesivir    Sepsis (HCC)- (present on admission)  Assessment & Plan  This is Severe Sepsis Present on admission  SIRS criteria identified on my evaluation include: Fever, with temperature greater than 101 deg F, Tachycardia, with heart rate greater than 90 BPM, Tachypnea, with respirations greater than 20 per minute and  Leukocytosis, with WBC greater than 12,000  Source of infection is pulmonary  Clinical indicators of end organ dysfunction include Acute respiratory failure as evidenced by a new need for invasive or non-invasive mechanical ventilation (Ventilator or BiPap)   Sepsis protocol initiated  Fluid resuscitation ordered per protocol  IV antibiotics as appropriate for source of sepsis  Reassessment: I have reassessed the patient's hemodynamic status  End organ dysfunction include(s):  Acute respiratory failure    Fluid resuscitation - judicious fluids given COVID-19  Antimicrobials -ampicillin-sulbactam    Lactic acidosis- (present on admission)  Assessment & Plan  Mild, likely 2/2 sepsis and WOB  Judicious IVF use given COVID-19    Acute hypoxemic respiratory failure (HCC)- (present on admission)  Assessment & Plan  Secondary to COVID-19 ARDS  HOB > 30  Early mobility  Titrate supplemental FiO2 to maintain SpO2 >85%  Encourage self-proning  Aggressive pulmonary hygiene      Neuropathic pain- (present on admission)  Assessment & Plan  Consider gabapentin if needed     DVT prophylaxis: Enoxaparin  PUD prophylaxis: Omeprazole  Glycemic control: Sliding scale insulin  Nutrition: NPO  Lines: None  Bennett: None  Mobility: Early mobility as tolerated  Goals of care: Full code  Disposition: ICU    I have performed a physical exam and reviewed and updated ROS and Plan today (12/12/2020). In review of yesterday's note (12/11/2020), there are no changes except as documented above.     Discussed patient condition and risk of morbidity and/or mortality with Hospitalist, RN, RT, Pharmacy, Charge nurse / hot rounds and Patient  The patient remains critically ill.  Critical care time = 30 minutes in directly providing and coordinating critical care and extensive data review.  No time overlap and excludes procedures.

## 2020-12-13 LAB
ANION GAP SERPL CALC-SCNC: 11 MMOL/L (ref 7–16)
BACTERIA BLD CULT: NORMAL
BACTERIA BLD CULT: NORMAL
BUN SERPL-MCNC: 19 MG/DL (ref 8–22)
CALCIUM SERPL-MCNC: 8.8 MG/DL (ref 8.5–10.5)
CHLORIDE SERPL-SCNC: 97 MMOL/L (ref 96–112)
CO2 SERPL-SCNC: 28 MMOL/L (ref 20–33)
CREAT SERPL-MCNC: 0.78 MG/DL (ref 0.5–1.4)
ERYTHROCYTE [DISTWIDTH] IN BLOOD BY AUTOMATED COUNT: 42.2 FL (ref 35.9–50)
GLUCOSE BLD-MCNC: 127 MG/DL (ref 65–99)
GLUCOSE BLD-MCNC: 219 MG/DL (ref 65–99)
GLUCOSE BLD-MCNC: 238 MG/DL (ref 65–99)
GLUCOSE SERPL-MCNC: 191 MG/DL (ref 65–99)
HCT VFR BLD AUTO: 47.7 % (ref 42–52)
HGB BLD-MCNC: 16.1 G/DL (ref 14–18)
MAGNESIUM SERPL-MCNC: 2.4 MG/DL (ref 1.5–2.5)
MCH RBC QN AUTO: 30.1 PG (ref 27–33)
MCHC RBC AUTO-ENTMCNC: 33.8 G/DL (ref 33.7–35.3)
MCV RBC AUTO: 89.3 FL (ref 81.4–97.8)
PHOSPHATE SERPL-MCNC: 2.3 MG/DL (ref 2.5–4.5)
PLATELET # BLD AUTO: 412 K/UL (ref 164–446)
PMV BLD AUTO: 9.1 FL (ref 9–12.9)
POTASSIUM SERPL-SCNC: 3.7 MMOL/L (ref 3.6–5.5)
RBC # BLD AUTO: 5.34 M/UL (ref 4.7–6.1)
SIGNIFICANT IND 70042: NORMAL
SIGNIFICANT IND 70042: NORMAL
SITE SITE: NORMAL
SITE SITE: NORMAL
SODIUM SERPL-SCNC: 136 MMOL/L (ref 135–145)
SOURCE SOURCE: NORMAL
SOURCE SOURCE: NORMAL
WBC # BLD AUTO: 16 K/UL (ref 4.8–10.8)

## 2020-12-13 PROCEDURE — 700105 HCHG RX REV CODE 258: Performed by: INTERNAL MEDICINE

## 2020-12-13 PROCEDURE — 700101 HCHG RX REV CODE 250: Performed by: EMERGENCY MEDICINE

## 2020-12-13 PROCEDURE — 94640 AIRWAY INHALATION TREATMENT: CPT

## 2020-12-13 PROCEDURE — 700105 HCHG RX REV CODE 258: Performed by: EMERGENCY MEDICINE

## 2020-12-13 PROCEDURE — 700102 HCHG RX REV CODE 250 W/ 637 OVERRIDE(OP): Performed by: INTERNAL MEDICINE

## 2020-12-13 PROCEDURE — A9270 NON-COVERED ITEM OR SERVICE: HCPCS | Performed by: INTERNAL MEDICINE

## 2020-12-13 PROCEDURE — 85027 COMPLETE CBC AUTOMATED: CPT

## 2020-12-13 PROCEDURE — 700111 HCHG RX REV CODE 636 W/ 250 OVERRIDE (IP): Performed by: HOSPITALIST

## 2020-12-13 PROCEDURE — 82962 GLUCOSE BLOOD TEST: CPT

## 2020-12-13 PROCEDURE — 770022 HCHG ROOM/CARE - ICU (200)

## 2020-12-13 PROCEDURE — 700102 HCHG RX REV CODE 250 W/ 637 OVERRIDE(OP): Performed by: HOSPITALIST

## 2020-12-13 PROCEDURE — 700111 HCHG RX REV CODE 636 W/ 250 OVERRIDE (IP): Performed by: EMERGENCY MEDICINE

## 2020-12-13 PROCEDURE — 83735 ASSAY OF MAGNESIUM: CPT

## 2020-12-13 PROCEDURE — A9270 NON-COVERED ITEM OR SERVICE: HCPCS | Performed by: HOSPITALIST

## 2020-12-13 PROCEDURE — 99233 SBSQ HOSP IP/OBS HIGH 50: CPT | Performed by: INTERNAL MEDICINE

## 2020-12-13 PROCEDURE — 80048 BASIC METABOLIC PNL TOTAL CA: CPT

## 2020-12-13 PROCEDURE — 84100 ASSAY OF PHOSPHORUS: CPT

## 2020-12-13 PROCEDURE — 700105 HCHG RX REV CODE 258: Performed by: HOSPITALIST

## 2020-12-13 PROCEDURE — 700101 HCHG RX REV CODE 250: Performed by: INTERNAL MEDICINE

## 2020-12-13 PROCEDURE — 94760 N-INVAS EAR/PLS OXIMETRY 1: CPT

## 2020-12-13 RX ADMIN — OMEPRAZOLE 20 MG: 20 CAPSULE, DELAYED RELEASE ORAL at 05:19

## 2020-12-13 RX ADMIN — REMDESIVIR 100 MG: 100 INJECTION, POWDER, LYOPHILIZED, FOR SOLUTION INTRAVENOUS at 19:47

## 2020-12-13 RX ADMIN — GABAPENTIN 400 MG: 400 CAPSULE ORAL at 17:19

## 2020-12-13 RX ADMIN — INSULIN HUMAN 1 UNITS: 100 INJECTION, SOLUTION PARENTERAL at 20:38

## 2020-12-13 RX ADMIN — FUROSEMIDE 20 MG: 10 INJECTION, SOLUTION INTRAMUSCULAR; INTRAVENOUS at 17:20

## 2020-12-13 RX ADMIN — AMPICILLIN AND SULBACTAM 3 G: 2; 1 INJECTION, POWDER, FOR SOLUTION INTRAVENOUS at 12:50

## 2020-12-13 RX ADMIN — DEXAMETHASONE 6 MG: 4 TABLET ORAL at 05:19

## 2020-12-13 RX ADMIN — FUROSEMIDE 20 MG: 10 INJECTION, SOLUTION INTRAMUSCULAR; INTRAVENOUS at 05:19

## 2020-12-13 RX ADMIN — INSULIN HUMAN 2 UNITS: 100 INJECTION, SOLUTION PARENTERAL at 17:30

## 2020-12-13 RX ADMIN — AMPICILLIN AND SULBACTAM 3 G: 2; 1 INJECTION, POWDER, FOR SOLUTION INTRAVENOUS at 05:18

## 2020-12-13 RX ADMIN — INSULIN HUMAN 2 UNITS: 100 INJECTION, SOLUTION PARENTERAL at 12:56

## 2020-12-13 RX ADMIN — ENOXAPARIN SODIUM 40 MG: 40 INJECTION SUBCUTANEOUS at 05:18

## 2020-12-13 RX ADMIN — AMPICILLIN AND SULBACTAM 3 G: 2; 1 INJECTION, POWDER, FOR SOLUTION INTRAVENOUS at 17:19

## 2020-12-13 RX ADMIN — ESCITALOPRAM OXALATE 10 MG: 10 TABLET ORAL at 05:19

## 2020-12-13 RX ADMIN — POTASSIUM PHOSPHATE, MONOBASIC AND POTASSIUM PHOSPHATE, DIBASIC 15 MMOL: 224; 236 INJECTION, SOLUTION, CONCENTRATE INTRAVENOUS at 08:41

## 2020-12-13 RX ADMIN — GABAPENTIN 400 MG: 400 CAPSULE ORAL at 05:19

## 2020-12-13 ASSESSMENT — ENCOUNTER SYMPTOMS
BLURRED VISION: 0
SPUTUM PRODUCTION: 0
DIAPHORESIS: 0
HEMOPTYSIS: 0
EYE DISCHARGE: 0
ABDOMINAL PAIN: 0
DIZZINESS: 0
SORE THROAT: 0
NERVOUS/ANXIOUS: 1
MYALGIAS: 1
SHORTNESS OF BREATH: 1
PALPITATIONS: 1
WEAKNESS: 0
DIARRHEA: 0
COUGH: 1
CONSTIPATION: 0
NECK PAIN: 0
ORTHOPNEA: 0
HEARTBURN: 0
CHILLS: 0

## 2020-12-13 ASSESSMENT — PAIN DESCRIPTION - PAIN TYPE: TYPE: ACUTE PAIN

## 2020-12-13 NOTE — PALLIATIVE CARE
Palliative Care follow-up  Arrived on the unit, all translation I-pads are currently in use and pt is Egyptian speaking only.   Spoke with pts bedside RN Kerri. Pt is doing well and has transfer order off the ICU.     Updated: Dr Hardin    Plan: Will meet with pt tomorrow to complete consult.     Thank you for allowing Palliative Care to support this patient and family. Contact [phone] for additional assistance, change in patient status, or with any questions/concerns.

## 2020-12-13 NOTE — PROGRESS NOTES
[facility] Medicine Daily Progress Note    Date of Service  12/13/2020    Chief Complaint  55 y.o. male admitted 12/8/2020 with   Chief Complaint   Patient presents with   • Shortness of Breath     pt has diarrhea, vomiting, sob, cough the last two weeks.          Hospital Course  This 55-year-old male with a past medical history significant for chronic neuropathy, type 2 diabetes mellitus with glycol 7.3 presented to ER on 12/8/920 with a complaint of worsening shortness of breath.  He stated he had loose bowel movement, nausea, vomiting with generalized body ache.    In ER he is found to be COVID-19 positive, noted to be in respiratory distress, placed on high flow.  Evaluated by intensivist Dr. Greenberg, recs monitoring patient in tele.    He is noted to have lactic acidosis of 8.5 likely secondary to hypoxia versus hypovolemia, continue to trend.  He is also noted to have elevated procalcitonin at 2.12, WBC at 22.2; he was empirically treated with IV antibiotics for superimposed bacterial pneumonia in addition to Covid pneumonia.    Also noted to have elevated liver enzyme with AST/ALT 77/74 trending down to 66/72  He is noted to have Glyco at 7.3    Today a.m., patient is noted to be in respiratory distress, Using accessory muscles; desaturating in spite of being on the maximum high flow plus nonrebreather, ABG obtained showed PO2 of 49, intensivist contacted and patient was transferred to ICU for higher level of care.    Interval Problem Update    12/11:  --No acute events overnight.  Patient noted to be afebrile.  In the morning, patient is noted to be in respiratory distress, noted to be hypoxic even on 60 L at 100% along with nonrebreather, ABG was obtained which showed PO2 49.   --Also noted to have elevated D-dimer > 20; started on weight-based Lovenox.  --He is noted to have elevated procalcitonin but MRSA nasal swab negative, thus stopped  Zyvox  --AST/ALT 51/60  -- Lactic acidosis  --Continue Decadron  and remdesivir    12/13 patient was able to mobilize and sit in chair.  He remains on high flow oxygen at 45 L.  I have encouraged self proning.  Continue Decadron and therapeutic Lovenox.  Procalcitonin was elevated, continue Unasyn.  I discussed CODE STATUS with the patient and he has affirmed his wish to remain a full code.     Consultants/Specialty  Intensivist    Code Status  Full Code    Disposition  To be Determined    Review of Systems  Review of Systems   Constitutional: Positive for malaise/fatigue. Negative for chills and diaphoresis.   HENT: Negative for congestion and sore throat.    Eyes: Negative for blurred vision and discharge.   Respiratory: Positive for cough and shortness of breath. Negative for hemoptysis and sputum production.    Cardiovascular: Positive for palpitations. Negative for chest pain and orthopnea.   Gastrointestinal: Negative for abdominal pain, constipation, diarrhea and heartburn.   Genitourinary: Negative for dysuria.   Musculoskeletal: Positive for myalgias. Negative for neck pain.   Neurological: Negative for dizziness and weakness.   Psychiatric/Behavioral: The patient is nervous/anxious.         Physical Exam  Temp:  [36.3 °C (97.4 °F)-37.2 °C (99 °F)] 36.8 °C (98.3 °F)  Pulse:  [] 107  Resp:  [18-39] 28  BP: (111-153)/() 149/84  SpO2:  [83 %-92 %] 88 %    Physical Exam  Vitals signs and nursing note reviewed.   Constitutional:       Appearance: Normal appearance.   HENT:      Head: Normocephalic and atraumatic.   Eyes:      Extraocular Movements: Extraocular movements intact.   Neck:      Musculoskeletal: Neck supple.   Cardiovascular:      Rate and Rhythm: Normal rate.      Pulses: Normal pulses.      Heart sounds: No murmur.   Pulmonary:      Effort: Respiratory distress present.      Breath sounds: Rales present.      Comments:  Poor and shallow breath sounds  Abdominal:      General: Abdomen is flat. There is no distension.      Palpations: Abdomen is soft.       Tenderness: There is no abdominal tenderness.   Musculoskeletal:         General: No swelling.      Right lower leg: No edema.      Left lower leg: No edema.   Skin:     General: Skin is warm.   Neurological:      Mental Status: He is alert and oriented to person, place, and time.      Cranial Nerves: No cranial nerve deficit.   Psychiatric:         Mood and Affect: Mood normal.         Fluids    Intake/Output Summary (Last 24 hours) at 12/13/2020 1355  Last data filed at 12/13/2020 1100  Gross per 24 hour   Intake 1550 ml   Output 2250 ml   Net -700 ml       Laboratory  Recent Labs     12/11/20  0748 12/11/20  0748 12/11/20  1730 12/12/20  0425 12/13/20  0315   WBC 18.8*  --   --  18.5* 16.0*   RBC 4.51*  --   --  4.71 5.34   HEMOGLOBIN 13.7*   < > 13.7* 14.0 16.1   HEMATOCRIT 40.4*  --   --  42.6 47.7   MCV 89.6  --   --  90.4 89.3   MCH 30.4  --   --  29.7 30.1   MCHC 33.9  --   --  32.9* 33.8   RDW 44.6  --   --  43.9 42.2   PLATELETCT 380  --   --  388 412   MPV 9.0  --   --  8.9* 9.1    < > = values in this interval not displayed.     Recent Labs     12/11/20  0748 12/12/20  0640 12/13/20  0315   SODIUM 137 140 136   POTASSIUM 3.7 3.3* 3.7   CHLORIDE 98 100 97   CO2 27 26 28   GLUCOSE 189* 144* 191*   BUN 21 23* 19   CREATININE 1.01 0.86 0.78   CALCIUM 8.5 8.5 8.8                   Imaging  US-EXTREMITY VENOUS LOWER BILAT   Final Result      EC-ECHOCARDIOGRAM LTD W/O CONT   Final Result      DX-CHEST-LIMITED (1 VIEW)   Final Result      No significant change in bilateral pneumonia.      DX-CHEST-LIMITED (1 VIEW)   Final Result         Persistent bilateral pulmonary opacities consistent with Covid 19 pneumonia.      DX-CHEST-PORTABLE (1 VIEW)   Final Result      Bilateral peripheral pulmonary airspace process highly suspicious for Covid pneumonia           Assessment/Plan  * Pneumonia due to COVID-19 virus  Assessment & Plan  Patient is on high flow, will continue Decadron 6 mg p.o. daily, continue  remdesivir.  continue incentive spirometer, provide Zn, vitamin D and ascorbic acid  Encourage proning and mobilization  Patient does have superimposed bacterial pneumonia, will continue Unasyn; stop Zyvox as MRSA nasal swab negative  Jacob negative for DVT in bilateral lower extremities, echocardiogram reveals normal LVEF  Continue therapeutic Lovenox due to elevated D-dimer >20    Leukocytosis  Assessment & Plan  At 18ss ,  Likely 2/2 steroid  Vs superimposed bacterial infection    Abnormal LFTs  Assessment & Plan  Suspect due to covid 19   Cont to trend    Sepsis (HCC)- (present on admission)  Assessment & Plan  This is Severe Sepsis Present on admission  SIRS criteria identified on my evaluation include: Tachycardia, with heart rate greater than 90 BPM, Tachypnea, with respirations greater than 20 per minute and Leukocytosis, with WBC greater than 12,000  Source of infection is pulmonary  Clinical indicators of end organ dysfunction include Lactate >2 mmol/L (18.0 mg/dL)  Sepsis protocol initiated  Fluid resuscitation ordered per protocol  IV antibiotics as appropriate for source of sepsis  Reassessment: I have reassessed the patient's hemodynamic status  End organ dysfunction include(s):  Acute respiratory failure       Suspect likely pulmonary due to covid 19    -Empiric IV abx   -- Titarte O2 as needed    Lactic acidosis- (present on admission)  Assessment & Plan  At 2.6, continue to trend    Acute hypoxemic respiratory failure (HCC)- (present on admission)  Assessment & Plan  Likely  secondary to Covid pneumonia superimposed to the patient with pneumonia  We will continue Decadron 6 mg p.o. daily, remdesivir  Encourage incentive spirometer, self proning, titrate oxygen as needed  Currently on high flow nasal cannula at 45 L    Neuropathic pain- (present on admission)  Assessment & Plan  Chronic hx of, has been without medications for over a year   - continue gabapentin       VTE prophylaxis: Lovenox

## 2020-12-14 LAB
ANION GAP SERPL CALC-SCNC: 15 MMOL/L (ref 7–16)
BUN SERPL-MCNC: 21 MG/DL (ref 8–22)
CALCIUM SERPL-MCNC: 8.7 MG/DL (ref 8.5–10.5)
CHLORIDE SERPL-SCNC: 94 MMOL/L (ref 96–112)
CO2 SERPL-SCNC: 24 MMOL/L (ref 20–33)
CREAT SERPL-MCNC: 0.79 MG/DL (ref 0.5–1.4)
ERYTHROCYTE [DISTWIDTH] IN BLOOD BY AUTOMATED COUNT: 42.6 FL (ref 35.9–50)
GLUCOSE BLD-MCNC: 151 MG/DL (ref 65–99)
GLUCOSE BLD-MCNC: 168 MG/DL (ref 65–99)
GLUCOSE BLD-MCNC: 252 MG/DL (ref 65–99)
GLUCOSE BLD-MCNC: 263 MG/DL (ref 65–99)
GLUCOSE BLD-MCNC: 283 MG/DL (ref 65–99)
GLUCOSE SERPL-MCNC: 146 MG/DL (ref 65–99)
HCT VFR BLD AUTO: 42.7 % (ref 42–52)
HGB BLD-MCNC: 14.6 G/DL (ref 14–18)
MAGNESIUM SERPL-MCNC: 2.3 MG/DL (ref 1.5–2.5)
MCH RBC QN AUTO: 30.7 PG (ref 27–33)
MCHC RBC AUTO-ENTMCNC: 34.2 G/DL (ref 33.7–35.3)
MCV RBC AUTO: 89.9 FL (ref 81.4–97.8)
PHOSPHATE SERPL-MCNC: 2.7 MG/DL (ref 2.5–4.5)
PLATELET # BLD AUTO: 377 K/UL (ref 164–446)
PMV BLD AUTO: 8.9 FL (ref 9–12.9)
POTASSIUM SERPL-SCNC: 3.3 MMOL/L (ref 3.6–5.5)
RBC # BLD AUTO: 4.75 M/UL (ref 4.7–6.1)
SODIUM SERPL-SCNC: 133 MMOL/L (ref 135–145)
WBC # BLD AUTO: 14.5 K/UL (ref 4.8–10.8)

## 2020-12-14 PROCEDURE — 700102 HCHG RX REV CODE 250 W/ 637 OVERRIDE(OP): Performed by: HOSPITALIST

## 2020-12-14 PROCEDURE — 700111 HCHG RX REV CODE 636 W/ 250 OVERRIDE (IP): Performed by: STUDENT IN AN ORGANIZED HEALTH CARE EDUCATION/TRAINING PROGRAM

## 2020-12-14 PROCEDURE — 99233 SBSQ HOSP IP/OBS HIGH 50: CPT | Performed by: INTERNAL MEDICINE

## 2020-12-14 PROCEDURE — 700102 HCHG RX REV CODE 250 W/ 637 OVERRIDE(OP): Performed by: INTERNAL MEDICINE

## 2020-12-14 PROCEDURE — 94640 AIRWAY INHALATION TREATMENT: CPT

## 2020-12-14 PROCEDURE — 85027 COMPLETE CBC AUTOMATED: CPT

## 2020-12-14 PROCEDURE — 83735 ASSAY OF MAGNESIUM: CPT

## 2020-12-14 PROCEDURE — A9270 NON-COVERED ITEM OR SERVICE: HCPCS | Performed by: HOSPITALIST

## 2020-12-14 PROCEDURE — 84100 ASSAY OF PHOSPHORUS: CPT

## 2020-12-14 PROCEDURE — 82962 GLUCOSE BLOOD TEST: CPT

## 2020-12-14 PROCEDURE — A9270 NON-COVERED ITEM OR SERVICE: HCPCS | Performed by: INTERNAL MEDICINE

## 2020-12-14 PROCEDURE — 770020 HCHG ROOM/CARE - TELE (206)

## 2020-12-14 PROCEDURE — 80048 BASIC METABOLIC PNL TOTAL CA: CPT

## 2020-12-14 PROCEDURE — 700111 HCHG RX REV CODE 636 W/ 250 OVERRIDE (IP): Performed by: HOSPITALIST

## 2020-12-14 PROCEDURE — 700111 HCHG RX REV CODE 636 W/ 250 OVERRIDE (IP): Performed by: EMERGENCY MEDICINE

## 2020-12-14 RX ORDER — POTASSIUM CHLORIDE 20 MEQ/1
40 TABLET, EXTENDED RELEASE ORAL EVERY 6 HOURS
Status: COMPLETED | OUTPATIENT
Start: 2020-12-14 | End: 2020-12-14

## 2020-12-14 RX ADMIN — OXYCODONE HYDROCHLORIDE 5 MG: 5 TABLET ORAL at 07:28

## 2020-12-14 RX ADMIN — FUROSEMIDE 20 MG: 10 INJECTION, SOLUTION INTRAMUSCULAR; INTRAVENOUS at 16:52

## 2020-12-14 RX ADMIN — INSULIN HUMAN 1 UNITS: 100 INJECTION, SOLUTION PARENTERAL at 07:30

## 2020-12-14 RX ADMIN — DEXAMETHASONE 6 MG: 4 TABLET ORAL at 05:51

## 2020-12-14 RX ADMIN — POTASSIUM CHLORIDE 40 MEQ: 20 TABLET, EXTENDED RELEASE ORAL at 07:28

## 2020-12-14 RX ADMIN — INSULIN HUMAN 3 UNITS: 100 INJECTION, SOLUTION PARENTERAL at 20:41

## 2020-12-14 RX ADMIN — GABAPENTIN 400 MG: 400 CAPSULE ORAL at 17:38

## 2020-12-14 RX ADMIN — INSULIN HUMAN 3 UNITS: 100 INJECTION, SOLUTION PARENTERAL at 11:51

## 2020-12-14 RX ADMIN — FUROSEMIDE 20 MG: 10 INJECTION, SOLUTION INTRAMUSCULAR; INTRAVENOUS at 05:51

## 2020-12-14 RX ADMIN — ENOXAPARIN SODIUM 40 MG: 40 INJECTION SUBCUTANEOUS at 05:50

## 2020-12-14 RX ADMIN — ACETAMINOPHEN 650 MG: 325 TABLET, FILM COATED ORAL at 17:38

## 2020-12-14 RX ADMIN — POTASSIUM CHLORIDE 40 MEQ: 20 TABLET, EXTENDED RELEASE ORAL at 13:15

## 2020-12-14 RX ADMIN — ESCITALOPRAM OXALATE 10 MG: 10 TABLET ORAL at 05:51

## 2020-12-14 RX ADMIN — HALOPERIDOL LACTATE 5 MG: 5 INJECTION, SOLUTION INTRAMUSCULAR at 03:10

## 2020-12-14 RX ADMIN — OXYCODONE HYDROCHLORIDE 5 MG: 5 TABLET ORAL at 16:51

## 2020-12-14 RX ADMIN — OXYCODONE HYDROCHLORIDE 5 MG: 5 TABLET ORAL at 20:43

## 2020-12-14 RX ADMIN — INSULIN HUMAN 3 UNITS: 100 INJECTION, SOLUTION PARENTERAL at 17:35

## 2020-12-14 RX ADMIN — OMEPRAZOLE 20 MG: 20 CAPSULE, DELAYED RELEASE ORAL at 05:51

## 2020-12-14 RX ADMIN — GABAPENTIN 400 MG: 400 CAPSULE ORAL at 05:51

## 2020-12-14 ASSESSMENT — ENCOUNTER SYMPTOMS
NERVOUS/ANXIOUS: 1
HEMOPTYSIS: 0
HEARTBURN: 0
ABDOMINAL PAIN: 0
WEAKNESS: 0
EYE DISCHARGE: 0
DIAPHORESIS: 0
COUGH: 1
SHORTNESS OF BREATH: 1
DIZZINESS: 0
CONSTIPATION: 0
DIARRHEA: 0
MYALGIAS: 1
BLURRED VISION: 0
PALPITATIONS: 1
CHILLS: 0
NECK PAIN: 0
SPUTUM PRODUCTION: 0
ORTHOPNEA: 0
SORE THROAT: 0

## 2020-12-14 ASSESSMENT — PAIN DESCRIPTION - PAIN TYPE
TYPE: ACUTE PAIN
TYPE: ACUTE PAIN

## 2020-12-14 NOTE — CONSULTS
"Reason for PC Consult: Advance Care Planning    Consulted by: Dr Schroeder     Assessment:  General: 54 yo male admitted on 12/8/2020 for COVID-19  PMH: DM, Chronic neuropathy  Pt presented to the ED due to feeling sick for approximately two weeks, now with increasing SOB, N/V, diarrhea, body aches, cough. Chest xray showed \"Bilateral peripheral pulmonary airspace process highly suspicious for Covid pneumonia\"    Social: Pt lives with his father who has also been hospitalized     Consults:   Pulmonary  Palliative     Dyspnea: Yes- 85% 60 L highflow NC @100% FiO2  Last BM: 12/13/20-    Pain: No- pt currently denies  Depression: Mood appropriate for situation-    Dementia: No;       Spiritual:  Is Shinto or spirituality important for coping with this illness? Yes-    Has a  or spiritual provider visit been requested? No    Palliative Performance Scale: 50%    Advance Directive: None on file, completed during consult. Pt names his sister, Nadia as his DPOA but declines to make care choices.   DPOA: No, NOK Nadia Mcintyre [phone], sister  POLST: None on file.     Code Status: Full-      Outcome:  Met with the pt at the bedside, utilizing  Hema ID#[id number]. Introduced self and the role of Palliative care. Spoke about his admission, pt feels weak and is shaky, but remains hopefull.   Spoke about his support through his family. Pt relies on them all to keep his spirits up.     We spoke about his previous conversations with the MDs. Pt acknowledges understanding that he is very ill. He says that he feels a \"little better than I did\"   Spoke about code status, discussed ramifications of CPR. Pt wishes to remain a Full code.     We discussed if he had ever spoken with his family about end of life decisions, he has not. Spoke about ADs and their purpose. He says that he would like to complete an AD and wishes to name his sister as his HC-DPOA. We went over the AD in detail. He appoints his sister, " Nadia Hall, but declines to name any choices for end of life care.   We discussed that these are guidelines for care and that the MDs would be discussing with his sister, if he was that ill. He says that he has never discussed End of Life care with his family. We spoke about all of the different choices.   Discussed if he had a terminal problem would he wish for further life support, even if the MDs felt that he would not recover. He says that he is unsure and feeling very tired and unable to make these types of decisions at this time.   Let him know to rest and that he should discuss his thoughts with her so that in the future, if his sister was in the position to need to make decisions for him, she would have a good understanding of his wishes.   Singh says that he will definitely think about this and talk with her in the future.   Asked if he had any questions or if there was anything that he needed, he is asking for a Turkey sandwich and some juice. Let him know that we can order that for him from the [facility].   Pt says he has no further questions at this time and is thankful to be able to complete the AD. Let him know once this is notarized, we will scan a copy into his EMR, and the original will be given to him to take home.     Updated: Dr Hardin    Plan: TBD as pts admission progresses     Thank you for allowing Palliative Care to participate in this patient's care. Please feel free to call [phone] with any questions or concerns.

## 2020-12-14 NOTE — PROGRESS NOTES
[facility] Medicine Daily Progress Note    Date of Service  12/14/2020    Chief Complaint  55 y.o. male admitted 12/8/2020 with shortness of breath  Chief Complaint   Patient presents with   • Shortness of Breath     pt has diarrhea, vomiting, sob, cough the last two weeks.          Hospital Course  This 55-year-old male with a past medical history significant for chronic neuropathy, type 2 diabetes mellitus with glycol 7.3 presented to ER on 12/8/920 with a complaint of worsening shortness of breath.  He stated he had loose bowel movement, nausea, vomiting with generalized body ache.    In ER he is found to be COVID-19 positive, noted to be in respiratory distress, placed on high flow.  Evaluated by intensivist Dr. Greenberg, recs monitoring patient in tele.    He was noted to have lactic acidosis of 8.5 likely secondary to hypoxia versus hypovolemia.  He also had elevated procalcitonin at 2.12, WBC at 22.2; he was empirically treated with IV antibiotics for superimposed bacterial pneumonia.    Patient developed worsening respiratory distress respiratory distress despite being on the maximum high flow nasal canula plus nonrebreather, ABG obtained showed PO2 of 49, intensivist contacted and patient was transferred to ICU for higher level of care.    Interval Problem Update    12/11:No acute events overnight.  Patient noted to be afebrile.  In the morning, patient is noted to be in respiratory distress, noted to be hypoxic even on 60 L at 100% along with nonrebreather, ABG was obtained which showed PO2 49. Also noted to have elevated D-dimer > 20; started on weight-based Lovenox.  He was noted to have elevated procalcitonin but MRSA nasal swab negative, thus stopped  Zyvox    12/13 patient was able to mobilize and sit in chair.  He remains on high flow oxygen at 45 L.  I have encouraged self proning.  Continue Decadron and therapeutic Lovenox.  Procalcitonin was elevated, continue Unasyn.  I discussed CODE STATUS with  the patient and he has affirmed his wish to remain a full code.    12/14 patient remains tachycardic and tachypneic with O2 saturations in the mid 80s maxed out on high flow oxygen and nonrebreather.  He remains afebrile.  His white count has been downtrending as well as procalcitonin.  Empiric IV antibiotics were discontinued.  I started the patient on IV Lasix 20 mg twice daily.  Continue Decadron.    Consultants/Specialty  Intensivist    Code Status  Full Code    Disposition  To be Determined    Review of Systems  Review of Systems   Constitutional: Positive for malaise/fatigue. Negative for chills and diaphoresis.   HENT: Negative for congestion and sore throat.    Eyes: Negative for blurred vision and discharge.   Respiratory: Positive for cough and shortness of breath. Negative for hemoptysis and sputum production.    Cardiovascular: Positive for palpitations. Negative for chest pain and orthopnea.   Gastrointestinal: Negative for abdominal pain, constipation, diarrhea and heartburn.   Genitourinary: Negative for dysuria.   Musculoskeletal: Positive for myalgias. Negative for neck pain.   Neurological: Negative for dizziness and weakness.   Psychiatric/Behavioral: The patient is nervous/anxious.         Physical Exam  Temp:  [36.6 °C (97.8 °F)-37 °C (98.6 °F)] 36.6 °C (97.9 °F)  Pulse:  [] 123  Resp:  [14-28] 22  BP: (107-141)/(70-87) 133/84  SpO2:  [76 %-95 %] 85 %    Physical Exam  Vitals signs and nursing note reviewed.   Constitutional:       Appearance: Normal appearance.   HENT:      Head: Normocephalic and atraumatic.   Eyes:      Extraocular Movements: Extraocular movements intact.   Neck:      Musculoskeletal: Neck supple.   Cardiovascular:      Rate and Rhythm: Normal rate.      Pulses: Normal pulses.      Heart sounds: No murmur.   Pulmonary:      Effort: Respiratory distress present.      Breath sounds: Rales present.      Comments:  Poor and shallow breath sounds  Abdominal:      General:  Abdomen is flat. There is no distension.      Palpations: Abdomen is soft.      Tenderness: There is no abdominal tenderness.   Musculoskeletal:         General: No swelling.      Right lower leg: No edema.      Left lower leg: No edema.   Skin:     General: Skin is warm.   Neurological:      Mental Status: He is alert and oriented to person, place, and time.      Cranial Nerves: No cranial nerve deficit.   Psychiatric:         Mood and Affect: Mood normal.         Fluids    Intake/Output Summary (Last 24 hours) at 12/14/2020 1353  Last data filed at 12/14/2020 0800  Gross per 24 hour   Intake 850 ml   Output 1950 ml   Net -1100 ml       Laboratory  Recent Labs     12/12/20  0425 12/13/20  0315 12/14/20  0315   WBC 18.5* 16.0* 14.5*   RBC 4.71 5.34 4.75   HEMOGLOBIN 14.0 16.1 14.6   HEMATOCRIT 42.6 47.7 42.7   MCV 90.4 89.3 89.9   MCH 29.7 30.1 30.7   MCHC 32.9* 33.8 34.2   RDW 43.9 42.2 42.6   PLATELETCT 388 412 377   MPV 8.9* 9.1 8.9*     Recent Labs     12/12/20  0640 12/13/20  0315 12/14/20  0315   SODIUM 140 136 133*   POTASSIUM 3.3* 3.7 3.3*   CHLORIDE 100 97 94*   CO2 26 28 24   GLUCOSE 144* 191* 146*   BUN 23* 19 21   CREATININE 0.86 0.78 0.79   CALCIUM 8.5 8.8 8.7                   Imaging  US-EXTREMITY VENOUS LOWER BILAT   Final Result      EC-ECHOCARDIOGRAM LTD W/O CONT   Final Result      DX-CHEST-LIMITED (1 VIEW)   Final Result      No significant change in bilateral pneumonia.      DX-CHEST-LIMITED (1 VIEW)   Final Result         Persistent bilateral pulmonary opacities consistent with Covid 19 pneumonia.      DX-CHEST-PORTABLE (1 VIEW)   Final Result      Bilateral peripheral pulmonary airspace process highly suspicious for Covid pneumonia           Assessment/Plan  * Sepsis (HCC)- (present on admission)  Assessment & Plan  This is Severe Sepsis Present on admission  SIRS criteria identified on my evaluation include: Tachycardia, with heart rate greater than 90 BPM, Tachypnea, with respirations  greater than 20 per minute and Leukocytosis, with WBC greater than 12,000  Source of infection is pulmonary  Clinical indicators of end organ dysfunction include Lactate >2 mmol/L (18.0 mg/dL)  Sepsis protocol initiated  Fluid resuscitation ordered per protocol  IV antibiotics as appropriate for source of sepsis  Reassessment: I have reassessed the patient's hemodynamic status  End organ dysfunction include(s):  Acute respiratory failure           Pneumonia due to COVID-19 virus  Assessment & Plan  Patient is on high flow, will continue Decadron 6 mg p.o. daily, continue remdesivir.  continue incentive spirometer, provide Zn, vitamin D and ascorbic acid  Encourage proning and mobilization  Patient was treated with empiric IV antibiotics.  WBC and procalcitonin have been downtrending  Ultrasound negative for DVT in bilateral lower extremities, echocardiogram reveals normal LVEF      Leukocytosis  Assessment & Plan  Improving  Continue to monitor    Abnormal LFTs  Assessment & Plan  Suspect due to covid 19   Cont to trend    Lactic acidosis- (present on admission)  Assessment & Plan  Improving    Acute hypoxemic respiratory failure (HCC)- (present on admission)  Assessment & Plan  Likely  secondary to Covid pneumonia superimposed to the patient with pneumonia  Continue Decadron  Encourage incentive spirometer, self proning, titrate oxygen as needed  Currently on high flow nasal cannula 100% FiO2 at 60 L    Neuropathic pain- (present on admission)  Assessment & Plan  Chronic hx of, has been without medications for over a year   - continue gabapentin       VTE prophylaxis: Lovenox

## 2020-12-15 LAB
ANION GAP SERPL CALC-SCNC: 12 MMOL/L (ref 7–16)
BUN SERPL-MCNC: 23 MG/DL (ref 8–22)
CALCIUM SERPL-MCNC: 8.7 MG/DL (ref 8.5–10.5)
CHLORIDE SERPL-SCNC: 96 MMOL/L (ref 96–112)
CO2 SERPL-SCNC: 29 MMOL/L (ref 20–33)
CREAT SERPL-MCNC: 0.8 MG/DL (ref 0.5–1.4)
ERYTHROCYTE [DISTWIDTH] IN BLOOD BY AUTOMATED COUNT: 43.6 FL (ref 35.9–50)
GLUCOSE BLD-MCNC: 185 MG/DL (ref 65–99)
GLUCOSE BLD-MCNC: 221 MG/DL (ref 65–99)
GLUCOSE BLD-MCNC: 264 MG/DL (ref 65–99)
GLUCOSE BLD-MCNC: 275 MG/DL (ref 65–99)
GLUCOSE SERPL-MCNC: 184 MG/DL (ref 65–99)
HCT VFR BLD AUTO: 42 % (ref 42–52)
HGB BLD-MCNC: 14 G/DL (ref 14–18)
MAGNESIUM SERPL-MCNC: 2.4 MG/DL (ref 1.5–2.5)
MCH RBC QN AUTO: 30.1 PG (ref 27–33)
MCHC RBC AUTO-ENTMCNC: 33.3 G/DL (ref 33.7–35.3)
MCV RBC AUTO: 90.3 FL (ref 81.4–97.8)
PHOSPHATE SERPL-MCNC: 2.6 MG/DL (ref 2.5–4.5)
PLATELET # BLD AUTO: 349 K/UL (ref 164–446)
PMV BLD AUTO: 9.1 FL (ref 9–12.9)
POTASSIUM SERPL-SCNC: 3.7 MMOL/L (ref 3.6–5.5)
RBC # BLD AUTO: 4.65 M/UL (ref 4.7–6.1)
SODIUM SERPL-SCNC: 137 MMOL/L (ref 135–145)
WBC # BLD AUTO: 16.5 K/UL (ref 4.8–10.8)

## 2020-12-15 PROCEDURE — 84100 ASSAY OF PHOSPHORUS: CPT

## 2020-12-15 PROCEDURE — 700102 HCHG RX REV CODE 250 W/ 637 OVERRIDE(OP): Performed by: INTERNAL MEDICINE

## 2020-12-15 PROCEDURE — 700102 HCHG RX REV CODE 250 W/ 637 OVERRIDE(OP): Performed by: HOSPITALIST

## 2020-12-15 PROCEDURE — 99233 SBSQ HOSP IP/OBS HIGH 50: CPT | Performed by: HOSPITALIST

## 2020-12-15 PROCEDURE — 82962 GLUCOSE BLOOD TEST: CPT | Mod: 91

## 2020-12-15 PROCEDURE — A9270 NON-COVERED ITEM OR SERVICE: HCPCS | Performed by: INTERNAL MEDICINE

## 2020-12-15 PROCEDURE — A9270 NON-COVERED ITEM OR SERVICE: HCPCS | Performed by: HOSPITALIST

## 2020-12-15 PROCEDURE — 700111 HCHG RX REV CODE 636 W/ 250 OVERRIDE (IP): Performed by: EMERGENCY MEDICINE

## 2020-12-15 PROCEDURE — 85027 COMPLETE CBC AUTOMATED: CPT

## 2020-12-15 PROCEDURE — 700111 HCHG RX REV CODE 636 W/ 250 OVERRIDE (IP): Performed by: HOSPITALIST

## 2020-12-15 PROCEDURE — 80048 BASIC METABOLIC PNL TOTAL CA: CPT

## 2020-12-15 PROCEDURE — 770020 HCHG ROOM/CARE - TELE (206)

## 2020-12-15 PROCEDURE — 83735 ASSAY OF MAGNESIUM: CPT

## 2020-12-15 PROCEDURE — 94640 AIRWAY INHALATION TREATMENT: CPT

## 2020-12-15 RX ORDER — POTASSIUM CHLORIDE 20 MEQ/1
40 TABLET, EXTENDED RELEASE ORAL ONCE
Status: COMPLETED | OUTPATIENT
Start: 2020-12-15 | End: 2020-12-15

## 2020-12-15 RX ADMIN — GABAPENTIN 400 MG: 400 CAPSULE ORAL at 16:27

## 2020-12-15 RX ADMIN — ENOXAPARIN SODIUM 40 MG: 40 INJECTION SUBCUTANEOUS at 05:06

## 2020-12-15 RX ADMIN — ESCITALOPRAM OXALATE 10 MG: 10 TABLET ORAL at 05:06

## 2020-12-15 RX ADMIN — ACETAMINOPHEN 650 MG: 325 TABLET, FILM COATED ORAL at 13:19

## 2020-12-15 RX ADMIN — POTASSIUM CHLORIDE 40 MEQ: 20 TABLET, EXTENDED RELEASE ORAL at 08:45

## 2020-12-15 RX ADMIN — GABAPENTIN 400 MG: 400 CAPSULE ORAL at 05:06

## 2020-12-15 RX ADMIN — FUROSEMIDE 20 MG: 10 INJECTION, SOLUTION INTRAMUSCULAR; INTRAVENOUS at 16:00

## 2020-12-15 RX ADMIN — FUROSEMIDE 20 MG: 10 INJECTION, SOLUTION INTRAMUSCULAR; INTRAVENOUS at 05:06

## 2020-12-15 RX ADMIN — INSULIN HUMAN 3 UNITS: 100 INJECTION, SOLUTION PARENTERAL at 17:27

## 2020-12-15 RX ADMIN — INSULIN HUMAN 1 UNITS: 100 INJECTION, SOLUTION PARENTERAL at 05:32

## 2020-12-15 RX ADMIN — INSULIN HUMAN 2 UNITS: 100 INJECTION, SOLUTION PARENTERAL at 11:22

## 2020-12-15 RX ADMIN — OMEPRAZOLE 20 MG: 20 CAPSULE, DELAYED RELEASE ORAL at 05:06

## 2020-12-15 RX ADMIN — DEXAMETHASONE 6 MG: 4 TABLET ORAL at 05:06

## 2020-12-15 RX ADMIN — INSULIN HUMAN 3 UNITS: 100 INJECTION, SOLUTION PARENTERAL at 20:21

## 2020-12-15 ASSESSMENT — ENCOUNTER SYMPTOMS
NAUSEA: 0
DOUBLE VISION: 0
CHILLS: 1
NERVOUS/ANXIOUS: 0
SORE THROAT: 0
DIZZINESS: 0
HEMOPTYSIS: 0
VOMITING: 0
DIARRHEA: 0
PALPITATIONS: 0
EYE REDNESS: 1
FEVER: 0
SHORTNESS OF BREATH: 1
COUGH: 1
BACK PAIN: 0
ABDOMINAL PAIN: 0

## 2020-12-15 NOTE — PROGRESS NOTES
Hospital Medicine Daily Progress Note    Date of Service  12/15/2020    Chief Complaint  Short of breath with diarrhea, vomiting x 2 weeks.    Hospital Course  German speaking 55 y.o. male with DMII, neuropathy, admitted 12/8/2020 with COVID-19 infection requiring ICU hospitalization due to O2 demand.    Interval Problem Update  12/15: On high flow NC (60L at 100%). On day 7 of 10 Decadron.  Tolerating diet.     Consultants/Specialty  Pulmonary    Code Status  Full Code    Disposition  Transfer to [facility] floor.    Review of Systems  Review of Systems   Constitutional: Positive for chills and malaise/fatigue. Negative for fever.   HENT: Negative for sore throat.    Eyes: Positive for redness. Negative for double vision.   Respiratory: Positive for cough and shortness of breath. Negative for hemoptysis.    Cardiovascular: Negative for chest pain, palpitations and leg swelling.   Gastrointestinal: Negative for abdominal pain, diarrhea, nausea and vomiting.   Genitourinary: Negative for dysuria and hematuria.   Musculoskeletal: Negative for back pain.   Neurological: Negative for dizziness.   Psychiatric/Behavioral: The patient is not nervous/anxious.         Physical Exam  Temp:  [36 °C (96.8 °F)-36.4 °C (97.5 °F)] 36.2 °C (97.2 °F)  Pulse:  [] 73  Resp:  [13-30] 24  BP: (100-131)/(60-79) 112/75  SpO2:  [80 %-91 %] 90 %    Physical Exam  Vitals signs reviewed.   Constitutional:       Appearance: He is ill-appearing. He is not diaphoretic.   HENT:      Head: Normocephalic and atraumatic.      Nose: Nose normal.      Mouth/Throat:      Mouth: Mucous membranes are moist.      Pharynx: No oropharyngeal exudate.   Eyes:      General:         Right eye: No discharge.         Left eye: No discharge.      Extraocular Movements: Extraocular movements intact.      Conjunctiva/sclera:      Right eye: Hemorrhage present.      Left eye: Hemorrhage present.   Neck:      Musculoskeletal: No muscular tenderness.      Vascular:  No carotid bruit.   Cardiovascular:      Rate and Rhythm: Normal rate and regular rhythm.      Pulses:           Radial pulses are 2+ on the right side and 2+ on the left side.        Dorsalis pedis pulses are 2+ on the right side and 2+ on the left side.      Heart sounds: No murmur.   Pulmonary:      Effort: Pulmonary effort is normal. No respiratory distress.      Breath sounds: Rales present. No wheezing.   Abdominal:      General: Bowel sounds are normal. There is no distension.      Palpations: Abdomen is soft.   Musculoskeletal:         General: No swelling or tenderness.      Right lower leg: No edema.      Left lower leg: No edema.   Lymphadenopathy:      Cervical: No cervical adenopathy.   Skin:     Coloration: Skin is not jaundiced.      Findings: No bruising.   Neurological:      General: No focal deficit present.      Mental Status: He is alert and oriented to person, place, and time. Mental status is at baseline.      Cranial Nerves: No cranial nerve deficit.   Psychiatric:         Mood and Affect: Mood normal.         Fluids    Intake/Output Summary (Last 24 hours) at 12/15/2020 1831  Last data filed at 12/15/2020 1400  Gross per 24 hour   Intake 500 ml   Output 1215 ml   Net -715 ml       Laboratory  Recent Labs     12/13/20  0315 12/14/20  0315 12/15/20  0550   WBC 16.0* 14.5* 16.5*   RBC 5.34 4.75 4.65*   HEMOGLOBIN 16.1 14.6 14.0   HEMATOCRIT 47.7 42.7 42.0   MCV 89.3 89.9 90.3   MCH 30.1 30.7 30.1   MCHC 33.8 34.2 33.3*   RDW 42.2 42.6 43.6   PLATELETCT 412 377 349   MPV 9.1 8.9* 9.1     Recent Labs     12/13/20  0315 12/14/20  0315 12/15/20  0550   SODIUM 136 133* 137   POTASSIUM 3.7 3.3* 3.7   CHLORIDE 97 94* 96   CO2 28 24 29   GLUCOSE 191* 146* 184*   BUN 19 21 23*   CREATININE 0.78 0.79 0.80   CALCIUM 8.8 8.7 8.7                   Imaging  US-EXTREMITY VENOUS LOWER BILAT   Final Result      EC-ECHOCARDIOGRAM LTD W/O CONT   Final Result      DX-CHEST-LIMITED (1 VIEW)   Final Result      No  significant change in bilateral pneumonia.      DX-CHEST-LIMITED (1 VIEW)   Final Result         Persistent bilateral pulmonary opacities consistent with Covid 19 pneumonia.      DX-CHEST-PORTABLE (1 VIEW)   Final Result      Bilateral peripheral pulmonary airspace process highly suspicious for Covid pneumonia           Assessment/Plan  * Pneumonia due to COVID-19 virus  Assessment & Plan  Tested COVID-19 positive on 12/8  Patient is on high flow,  Decadron 6 mg p.o. daily until 12/17  S/p 5 days course of remdesivir with last dose 12/13.  continue incentive spirometer, provide Zn, vitamin D and ascorbic acid  Encourage proning and mobilization  Patient was treated with empiric IV antibiotics.  WBC and procalcitonin have been downtrending  Ultrasound negative for DVT in bilateral lower extremities, echocardiogram reveals normal LVEF    Leukocytosis  Assessment & Plan  Improving  Continue to monitor    Abnormal LFTs  Assessment & Plan  Suspect due to covid 19   Monitoring CMP (last checked 12/13)    Sepsis (HCC)  Assessment & Plan  This is Severe Sepsis Present on admission  SIRS criteria identified on my evaluation include: Tachycardia, with heart rate greater than 90 BPM, Tachypnea, with respirations greater than 20 per minute and Leukocytosis, with WBC greater than 12,000  Source of infection is pulmonary  Clinical indicators of end organ dysfunction include Lactate >2 mmol/L (18.0 mg/dL)  Sepsis protocol initiated  Fluid resuscitation ordered per protocol  IV antibiotics as appropriate for source of sepsis  Reassessment: I have reassessed the patient's hemodynamic status  End organ dysfunction include(s):  Acute respiratory failure           Lactic acidosis- (present on admission)  Assessment & Plan  Improving    Acute hypoxemic respiratory failure (HCC)- (present on admission)  Assessment & Plan  Likely  secondary to Covid pneumonia superimposed to the patient with pneumonia  Continue Decadron  Encourage  incentive spirometer, self proning, titrate oxygen as needed  Currently on high flow nasal cannula 100% FiO2 at 60 L  COVID + as of 12/8    Neuropathic pain- (present on admission)  Assessment & Plan  gabapentin       VTE prophylaxis: Enoxaparin 40mg daily

## 2020-12-16 ENCOUNTER — APPOINTMENT (OUTPATIENT)
Dept: RADIOLOGY | Facility: MEDICAL CENTER | Age: 55
DRG: 871 | End: 2020-12-16
Attending: INTERNAL MEDICINE
Payer: COMMERCIAL

## 2020-12-16 LAB
ACTION RANGE TRIGGERED IACRT: YES
ALBUMIN SERPL BCP-MCNC: 3 G/DL (ref 3.2–4.9)
ALBUMIN/GLOB SERPL: 0.8 G/DL
ALP SERPL-CCNC: 166 U/L (ref 30–99)
ALT SERPL-CCNC: 38 U/L (ref 2–50)
ANION GAP SERPL CALC-SCNC: 12 MMOL/L (ref 7–16)
ANION GAP SERPL CALC-SCNC: 15 MMOL/L (ref 7–16)
AST SERPL-CCNC: 37 U/L (ref 12–45)
BASE EXCESS BLDA CALC-SCNC: 2 MMOL/L (ref -4–3)
BILIRUB SERPL-MCNC: 0.6 MG/DL (ref 0.1–1.5)
BODY TEMPERATURE: ABNORMAL DEGREES
BUN SERPL-MCNC: 27 MG/DL (ref 8–22)
BUN SERPL-MCNC: 28 MG/DL (ref 8–22)
CALCIUM SERPL-MCNC: 8.3 MG/DL (ref 8.5–10.5)
CALCIUM SERPL-MCNC: 8.9 MG/DL (ref 8.5–10.5)
CHLORIDE SERPL-SCNC: 93 MMOL/L (ref 96–112)
CHLORIDE SERPL-SCNC: 96 MMOL/L (ref 96–112)
CO2 BLDA-SCNC: 26 MMOL/L (ref 20–33)
CO2 SERPL-SCNC: 25 MMOL/L (ref 20–33)
CO2 SERPL-SCNC: 29 MMOL/L (ref 20–33)
CREAT SERPL-MCNC: 0.73 MG/DL (ref 0.5–1.4)
CREAT SERPL-MCNC: 0.79 MG/DL (ref 0.5–1.4)
CRP SERPL HS-MCNC: 6.39 MG/DL (ref 0–0.75)
D DIMER PPP IA.FEU-MCNC: >20 UG/ML (FEU) (ref 0–0.5)
ERYTHROCYTE [DISTWIDTH] IN BLOOD BY AUTOMATED COUNT: 43.8 FL (ref 35.9–50)
GLOBULIN SER CALC-MCNC: 3.6 G/DL (ref 1.9–3.5)
GLUCOSE BLD-MCNC: 149 MG/DL (ref 65–99)
GLUCOSE BLD-MCNC: 253 MG/DL (ref 65–99)
GLUCOSE BLD-MCNC: 269 MG/DL (ref 65–99)
GLUCOSE BLD-MCNC: 273 MG/DL (ref 65–99)
GLUCOSE SERPL-MCNC: 153 MG/DL (ref 65–99)
GLUCOSE SERPL-MCNC: 290 MG/DL (ref 65–99)
HCO3 BLDA-SCNC: 24.8 MMOL/L (ref 17–25)
HCT VFR BLD AUTO: 45.7 % (ref 42–52)
HGB BLD-MCNC: 15.2 G/DL (ref 14–18)
HOROWITZ INDEX BLDA+IHG-RTO: 52 MM[HG]
INST. QUALIFIED PATIENT IIQPT: YES
MCH RBC QN AUTO: 30.1 PG (ref 27–33)
MCHC RBC AUTO-ENTMCNC: 33.3 G/DL (ref 33.7–35.3)
MCV RBC AUTO: 90.5 FL (ref 81.4–97.8)
NT-PROBNP SERPL IA-MCNC: 78 PG/ML (ref 0–125)
O2/TOTAL GAS SETTING VFR VENT: 100 %
PCO2 BLDA: 34.5 MMHG (ref 26–37)
PCO2 TEMP ADJ BLDA: 32.7 MMHG (ref 26–37)
PH BLDA: 7.46 [PH] (ref 7.4–7.5)
PH TEMP ADJ BLDA: 7.48 [PH] (ref 7.4–7.5)
PLATELET # BLD AUTO: 347 K/UL (ref 164–446)
PMV BLD AUTO: 9.5 FL (ref 9–12.9)
PO2 BLDA: 52 MMHG (ref 64–87)
PO2 TEMP ADJ BLDA: 48 MMHG (ref 64–87)
POTASSIUM SERPL-SCNC: 3.8 MMOL/L (ref 3.6–5.5)
POTASSIUM SERPL-SCNC: 4 MMOL/L (ref 3.6–5.5)
PROCALCITONIN SERPL-MCNC: 0.34 NG/ML
PROT SERPL-MCNC: 6.6 G/DL (ref 6–8.2)
RBC # BLD AUTO: 5.05 M/UL (ref 4.7–6.1)
SAO2 % BLDA: 89 % (ref 93–99)
SODIUM SERPL-SCNC: 133 MMOL/L (ref 135–145)
SODIUM SERPL-SCNC: 137 MMOL/L (ref 135–145)
SPECIMEN DRAWN FROM PATIENT: ABNORMAL
WBC # BLD AUTO: 20.5 K/UL (ref 4.8–10.8)

## 2020-12-16 PROCEDURE — 700111 HCHG RX REV CODE 636 W/ 250 OVERRIDE (IP): Performed by: HOSPITALIST

## 2020-12-16 PROCEDURE — 80053 COMPREHEN METABOLIC PANEL: CPT

## 2020-12-16 PROCEDURE — 85027 COMPLETE CBC AUTOMATED: CPT

## 2020-12-16 PROCEDURE — 93970 EXTREMITY STUDY: CPT

## 2020-12-16 PROCEDURE — 99291 CRITICAL CARE FIRST HOUR: CPT | Performed by: INTERNAL MEDICINE

## 2020-12-16 PROCEDURE — 84145 PROCALCITONIN (PCT): CPT

## 2020-12-16 PROCEDURE — A9270 NON-COVERED ITEM OR SERVICE: HCPCS | Performed by: HOSPITALIST

## 2020-12-16 PROCEDURE — 770022 HCHG ROOM/CARE - ICU (200)

## 2020-12-16 PROCEDURE — 86140 C-REACTIVE PROTEIN: CPT

## 2020-12-16 PROCEDURE — 82962 GLUCOSE BLOOD TEST: CPT | Mod: 91

## 2020-12-16 PROCEDURE — 700111 HCHG RX REV CODE 636 W/ 250 OVERRIDE (IP): Performed by: EMERGENCY MEDICINE

## 2020-12-16 PROCEDURE — 80048 BASIC METABOLIC PNL TOTAL CA: CPT

## 2020-12-16 PROCEDURE — 71045 X-RAY EXAM CHEST 1 VIEW: CPT

## 2020-12-16 PROCEDURE — 700102 HCHG RX REV CODE 250 W/ 637 OVERRIDE(OP): Performed by: INTERNAL MEDICINE

## 2020-12-16 PROCEDURE — 700102 HCHG RX REV CODE 250 W/ 637 OVERRIDE(OP): Performed by: HOSPITALIST

## 2020-12-16 PROCEDURE — 82803 BLOOD GASES ANY COMBINATION: CPT

## 2020-12-16 PROCEDURE — A9270 NON-COVERED ITEM OR SERVICE: HCPCS | Performed by: INTERNAL MEDICINE

## 2020-12-16 PROCEDURE — 36600 WITHDRAWAL OF ARTERIAL BLOOD: CPT

## 2020-12-16 PROCEDURE — 85379 FIBRIN DEGRADATION QUANT: CPT

## 2020-12-16 PROCEDURE — 83880 ASSAY OF NATRIURETIC PEPTIDE: CPT

## 2020-12-16 PROCEDURE — 700111 HCHG RX REV CODE 636 W/ 250 OVERRIDE (IP): Performed by: STUDENT IN AN ORGANIZED HEALTH CARE EDUCATION/TRAINING PROGRAM

## 2020-12-16 PROCEDURE — 99233 SBSQ HOSP IP/OBS HIGH 50: CPT | Performed by: HOSPITALIST

## 2020-12-16 PROCEDURE — 94640 AIRWAY INHALATION TREATMENT: CPT

## 2020-12-16 RX ORDER — DEXMEDETOMIDINE HYDROCHLORIDE 4 UG/ML
INJECTION, SOLUTION INTRAVENOUS
Status: DISCONTINUED
Start: 2020-12-16 | End: 2020-12-16

## 2020-12-16 RX ADMIN — OMEPRAZOLE 20 MG: 20 CAPSULE, DELAYED RELEASE ORAL at 05:17

## 2020-12-16 RX ADMIN — FUROSEMIDE 20 MG: 10 INJECTION, SOLUTION INTRAMUSCULAR; INTRAVENOUS at 05:17

## 2020-12-16 RX ADMIN — GABAPENTIN 400 MG: 400 CAPSULE ORAL at 05:17

## 2020-12-16 RX ADMIN — GABAPENTIN 400 MG: 400 CAPSULE ORAL at 18:00

## 2020-12-16 RX ADMIN — ESCITALOPRAM OXALATE 10 MG: 10 TABLET ORAL at 05:17

## 2020-12-16 RX ADMIN — OXYCODONE HYDROCHLORIDE 5 MG: 5 TABLET ORAL at 08:20

## 2020-12-16 RX ADMIN — INSULIN HUMAN 2 UNITS: 100 INJECTION, SOLUTION PARENTERAL at 22:04

## 2020-12-16 RX ADMIN — ENOXAPARIN SODIUM 40 MG: 40 INJECTION SUBCUTANEOUS at 05:18

## 2020-12-16 RX ADMIN — HALOPERIDOL LACTATE 5 MG: 5 INJECTION, SOLUTION INTRAMUSCULAR at 08:35

## 2020-12-16 RX ADMIN — INSULIN HUMAN 3 UNITS: 100 INJECTION, SOLUTION PARENTERAL at 17:00

## 2020-12-16 RX ADMIN — FUROSEMIDE 20 MG: 10 INJECTION, SOLUTION INTRAMUSCULAR; INTRAVENOUS at 17:36

## 2020-12-16 RX ADMIN — INSULIN HUMAN 3 UNITS: 100 INJECTION, SOLUTION PARENTERAL at 11:00

## 2020-12-16 RX ADMIN — DEXAMETHASONE 6 MG: 4 TABLET ORAL at 05:17

## 2020-12-16 NOTE — DISCHARGE PLANNING
Anticipated Discharge Disposition: TBD    Action: Chart review completed; pt not medically cleared for discharge. Pt remains on high flow oxygen.    Barriers to Discharge: Medical clearance  High flow oxygen needs    Plan: Care coordination will follow and assist with discharge planning needs.

## 2020-12-16 NOTE — PROGRESS NOTES
Hospital Medicine Daily Progress Note    Date of Service  12/16/2020    Chief Complaint  Short of breath with diarrhea, vomiting x 2 weeks.    Hospital Course  Hungarian speaking 55 y.o. male with DMII, neuropathy, admitted 12/8/2020 with COVID-19 infection requiring ICU hospitalization due to O2 demand.    Interval Problem Update  12/15: On high flow NC (60L at 100%). On day 7 of 10 Decadron.  Tolerating diet.     12/16: Alert but ongoing respiratory distress.  Acute worsening this am with sinus tachycardia on telemetry and worsening oxygen saturation.  An ABG was requested and showed pH:7.46/pCO2:34.5/pO2:48.  I alerted Dr Mayo for possible need of bipap or intubation.    Consultants/Specialty  Pulmonary    Code Status  Full Code    Disposition  Transfer to COVID floor.    Review of Systems  Review of Systems   Unable to perform ROS: Severe respiratory distress        Physical Exam  Temp:  [36 °C (96.8 °F)-36.6 °C (97.9 °F)] 36 °C (96.8 °F)  Pulse:  [] 131  Resp:  [16-46] 31  BP: (102-152)/(66-93) 123/80  SpO2:  [84 %-94 %] 87 %    Physical Exam  Vitals signs reviewed.   Constitutional:       General: He is in acute distress.      Appearance: He is ill-appearing. He is not diaphoretic.   HENT:      Head: Normocephalic and atraumatic.      Nose: Nose normal.      Mouth/Throat:      Mouth: Mucous membranes are moist.      Pharynx: No oropharyngeal exudate.   Eyes:      General:         Right eye: No discharge.         Left eye: No discharge.      Extraocular Movements: Extraocular movements intact.      Conjunctiva/sclera:      Right eye: Hemorrhage present.      Left eye: Hemorrhage present.   Neck:      Musculoskeletal: No neck rigidity.   Cardiovascular:      Rate and Rhythm: Regular rhythm. Tachycardia present.      Pulses:           Radial pulses are 2+ on the right side and 2+ on the left side.        Dorsalis pedis pulses are 2+ on the right side and 2+ on the left side.      Heart sounds: No murmur.    Pulmonary:      Effort: Respiratory distress present.      Breath sounds: Rales present. No wheezing.   Abdominal:      General: Bowel sounds are normal. There is no distension.      Palpations: Abdomen is soft.      Tenderness: There is no abdominal tenderness.   Musculoskeletal:         General: No swelling or tenderness.      Right lower leg: No edema.      Left lower leg: No edema.   Skin:     Coloration: Skin is not jaundiced.      Findings: No bruising.   Neurological:      General: No focal deficit present.      Mental Status: He is alert and oriented to person, place, and time. Mental status is at baseline.      Cranial Nerves: No cranial nerve deficit.   Psychiatric:         Mood and Affect: Mood normal.         Fluids    Intake/Output Summary (Last 24 hours) at 12/16/2020 1010  Last data filed at 12/16/2020 0800  Gross per 24 hour   Intake 250 ml   Output 1540 ml   Net -1290 ml       Laboratory  Recent Labs     12/14/20  0315 12/15/20  0550 12/16/20  0538   WBC 14.5* 16.5* 20.5*   RBC 4.75 4.65* 5.05   HEMOGLOBIN 14.6 14.0 15.2   HEMATOCRIT 42.7 42.0 45.7   MCV 89.9 90.3 90.5   MCH 30.7 30.1 30.1   MCHC 34.2 33.3* 33.3*   RDW 42.6 43.6 43.8   PLATELETCT 377 349 347   MPV 8.9* 9.1 9.5     Recent Labs     12/14/20  0315 12/15/20  0550 12/16/20  0538   SODIUM 133* 137 137   POTASSIUM 3.3* 3.7 4.0   CHLORIDE 94* 96 96   CO2 24 29 29   GLUCOSE 146* 184* 153*   BUN 21 23* 27*   CREATININE 0.79 0.80 0.73   CALCIUM 8.7 8.7 8.9                   Imaging  US-EXTREMITY VENOUS LOWER BILAT   Final Result      EC-ECHOCARDIOGRAM LTD W/O CONT   Final Result      DX-CHEST-LIMITED (1 VIEW)   Final Result      No significant change in bilateral pneumonia.      DX-CHEST-LIMITED (1 VIEW)   Final Result         Persistent bilateral pulmonary opacities consistent with Covid 19 pneumonia.      DX-CHEST-PORTABLE (1 VIEW)   Final Result      Bilateral peripheral pulmonary airspace process highly suspicious for Covid pneumonia       US-EXTREMITY VENOUS LOWER BILAT    (Results Pending)   DX-CHEST-PORTABLE (1 VIEW)    (Results Pending)        Assessment/Plan  * Pneumonia due to COVID-19 virus  Assessment & Plan  Tested COVID-19 positive on 12/8  Patient is on high flow,  Decadron 6 mg p.o. daily until 12/17  S/p 5 days course of remdesivir with last dose 12/13.  continue incentive spirometer, provide Zn, vitamin D and ascorbic acid  Encourage proning and mobilization  Patient was treated with empiric IV antibiotics.  WBC and procalcitonin have been downtrending  Ultrasound negative for DVT in bilateral lower extremities, echocardiogram reveals normal LVEF    Elevated d-dimer- (present on admission)  Assessment & Plan  LE US negative for DVT  Echo from 12/11 reviewed with no right heart failure.    Leukocytosis  Assessment & Plan  Worsening but on decadron.  Monitor vitals.  Continue to monitor    Abnormal LFTs  Assessment & Plan  Suspect due to covid 19   Monitoring CMP (last checked 12/13)  Recheck CMP 12/17    Sepsis (HCC)  Assessment & Plan  This is Severe Sepsis Present on admission  SIRS criteria identified on my evaluation include: Tachycardia, with heart rate greater than 90 BPM, Tachypnea, with respirations greater than 20 per minute and Leukocytosis, with WBC greater than 12,000  Source of infection is pulmonary  Clinical indicators of end organ dysfunction include Lactate >2 mmol/L (18.0 mg/dL)  Sepsis protocol initiated  Fluid resuscitation ordered per protocol  IV antibiotics as appropriate for source of sepsis  Reassessment: I have reassessed the patient's hemodynamic status  End organ dysfunction include(s):  Acute respiratory failure           Lactic acidosis- (present on admission)  Assessment & Plan  Improving    Acute hypoxemic respiratory failure (HCC)- (present on admission)  Assessment & Plan  Likely  secondary to Covid pneumonia superimposed to the patient with pneumonia  Continue Decadron  Worsening WBC but on  decadron  Encourage incentive spirometer, self proning, titrate oxygen as needed  Currently on high flow nasal canula 100% FiO2 at 60 L  COVID + as of 12/8    Neuropathic pain- (present on admission)  Assessment & Plan  gabapentin       VTE prophylaxis: Enoxaparin 40mg daily

## 2020-12-16 NOTE — PROGRESS NOTES
Critical Care Progress Note  (COVID-19 Crisis Standards of Care Documentation)    Date of Service: 2020    Date of Admission:  2020  3:25 PM    CC/HPI: Singh Hall is a 55 y.o. male with PMH of type 2 diabetes mellitus and neuropathy presented  with dyspnea, malaise, N/V/D.  Admitted to telemetry floor on HFNC, treated with Decadron and remdesivir, transferred to ICU  for increasing dyspnea and oxygen requirement and melanotic stool.  Melena resolved stable ADILIA globin on PPI.      Hospital Course:   transfer to ICU for increasing O2 requirement; ,melanotic stool now on PPI   continues to require HFNC + NRB; no further melanotic stools; start diet; start enoxaparin   -worsened P/F on HFNC plus NRB,    Interval Problem Update:  Called by hospitalist asked to sign back on today for worsening hypoxia on maximal HFNC plus NRB  HFNC 60/100%, NRB mask  AB.48/33/48  T-max 97.9  WBC up 14.5->16.5->20.5   D-dimer  >20  Remains on Lovenox 40 daily  DVT study -  Normal echo   I/O = 500/1.4 (-8.3L total since adimt)  Overnight ALOC, agitated; O to self only, anxious, agitated, pulling mask off  SR -> 's  PO diet  CXR R inf  Decadron 9/10  SSI  Completed Unasyn/azithro/3 days zyvox, MRSA nares neg  Sister updated      Repeat DVT study  Repeat CXR  Repeat inflammatory labs  Sputum culture    Physical Examination  General: Intermittently confused, moderate distress  Neuro/Psych: As above moves all extremities  HEENT: PERRL  CVS: SR/ST  Respiratory: Diminished throughout, HFNC  Abdomen/: Soft, NT  Extremities: 2+ edema  Skin: Warm, dry    Vitals:    20 0900   BP: 123/80   Pulse: (!) 131   Resp: (!) 31   Temp:    SpO2: (!) 87%              Intake/Output Summary (Last 24 hours) at 2020 0932  Last data filed at 2020 0800  Gross per 24 hour   Intake 250 ml   Output 1740 ml   Net -1490 ml       Recent Labs     20  0315 12/15/20  0550 20  0538    WBC 14.5* 16.5* 20.5*     Recent Labs     12/14/20  0315 12/15/20  0550 12/16/20  0538   SODIUM 133* 137 137   POTASSIUM 3.3* 3.7 4.0   CHLORIDE 94* 96 96   CO2 24 29 29   BUN 21 23* 27*   CREATININE 0.79 0.80 0.73   MAGNESIUM 2.3 2.4  --    PHOSPHORUS 2.7 2.6  --    CALCIUM 8.7 8.7 8.9     Recent Labs     12/14/20  0315 12/15/20  0550 12/16/20  0538   GLUCOSE 146* 184* 153*     Recent Labs     12/16/20  0913   ISTATAPH 7.465   ISTATAPCO2 34.5   ISTATAPO2 52*   ISTATATCO2 26   [id number] 89*   ISTATARTHCO3 24.8   ISTATARTBE 2   ISTATTEMP 96.4 F   ISTATFIO2 100   ISTATSPEC Arterial   ISTATAPHTC 7.483   [id number] 48*     US-EXTREMITY VENOUS LOWER BILAT   Final Result      EC-ECHOCARDIOGRAM LTD W/O CONT   Final Result      DX-CHEST-LIMITED (1 VIEW)   Final Result      No significant change in bilateral pneumonia.      DX-CHEST-LIMITED (1 VIEW)   Final Result         Persistent bilateral pulmonary opacities consistent with Covid 19 pneumonia.      DX-CHEST-PORTABLE (1 VIEW)   Final Result      Bilateral peripheral pulmonary airspace process highly suspicious for Covid pneumonia          Active Hospital Problem List:  Principal Problem:    Pneumonia due to COVID-19 virus POA: Unknown  Active Problems:    Neuropathic pain POA: Yes    Acute hypoxemic respiratory failure (HCC) POA: Yes    Lactic acidosis POA: Yes    Abnormal LFTs POA: Unknown    Leukocytosis POA: Unknown    Elevated d-dimer POA: Yes    Melena POA: Yes  Resolved Problems:    Hyponatremia POA: Yes    Type 2 diabetes mellitus without complication (HCC) POA: Yes    Agitation POA: Yes      Updated Plan:  * Pneumonia due to COVID-19 virus  Assessment & Plan  COVID-19 pneumonia  Dexamethasone - Day 3 of 10  Remdesivir - Day 3 of 5  Fluid balance - keep euvolemic  Encourage self-proning  Ampicillin-sulbactam for possible cobacterial infection (procalcitonin 3.42)     Melena- (present on admission)  Assessment & Plan  Melena x1, H/H stable  Omeprazole  Serial  CBC     Elevated d-dimer- (present on admission)  Assessment & Plan  D-Dimer > 20  Lower extremity doppler negative for VTE     Abnormal LFTs  Assessment & Plan  Slowly improving, monitor while on remdesivir     Sepsis (HCC)- (present on admission)  Assessment & Plan  This is Severe Sepsis Present on admission  SIRS criteria identified on my evaluation include: Fever, with temperature greater than 101 deg F, Tachycardia, with heart rate greater than 90 BPM, Tachypnea, with respirations greater than 20 per minute and Leukocytosis, with WBC greater than 12,000  Source of infection is pulmonary  Clinical indicators of end organ dysfunction include Acute respiratory failure as evidenced by a new need for invasive or non-invasive mechanical ventilation (Ventilator or BiPap)   Sepsis protocol initiated  Fluid resuscitation ordered per protocol  IV antibiotics as appropriate for source of sepsis  Reassessment: I have reassessed the patient's hemodynamic status  End organ dysfunction include(s):  Acute respiratory failure     Fluid resuscitation - judicious fluids given COVID-19  Antimicrobials -ampicillin-sulbactam     Lactic acidosis- (present on admission)  Assessment & Plan  Mild, likely 2/2 sepsis and WOB  Judicious IVF use given COVID-19     Acute hypoxemic respiratory failure (HCC)- (present on admission)  Assessment & Plan  Secondary to COVID-19 ARDS  HOB > 30  Early mobility  Titrate supplemental FiO2 to maintain SpO2 >85%  Encourage self-proning  Aggressive pulmonary hygiene        Neuropathic pain- (present on admission)  Assessment & Plan  Consider gabapentin if needed    Updated plan:  Continue HFNC, monitor closely in ICU, may require intubation, reviewed with hospitalist in detail.  Will follow.    Alexys Mayo M.D.  [facility]  The patient remains critically ill.  Critical care time = 35 minutes in directly providing and coordinating critical care and extensive data review.  No time overlap and  excludes procedures.

## 2020-12-16 NOTE — CARE PLAN
Problem: Nutritional:  Goal: Achieve adequate nutritional intake  Description: Patient will consume 50% of meals + supplements  Outcome: PROGRESSING SLOWER THAN EXPECTED     Admit day 8.  PO intake showed some improvement yesterday: 25-75% x2 meals per ADLs.  Need current weight for additional assessment; RD texted RN.     RD continues to follow.

## 2020-12-17 PROBLEM — E87.20 LACTIC ACIDOSIS: Status: RESOLVED | Noted: 2020-12-08 | Resolved: 2020-12-17

## 2020-12-17 LAB
ALBUMIN SERPL BCP-MCNC: 3.1 G/DL (ref 3.2–4.9)
ALBUMIN/GLOB SERPL: 0.9 G/DL
ALP SERPL-CCNC: 150 U/L (ref 30–99)
ALT SERPL-CCNC: 36 U/L (ref 2–50)
ANION GAP SERPL CALC-SCNC: 12 MMOL/L (ref 7–16)
AST SERPL-CCNC: 35 U/L (ref 12–45)
BILIRUB SERPL-MCNC: 0.5 MG/DL (ref 0.1–1.5)
BUN SERPL-MCNC: 30 MG/DL (ref 8–22)
CALCIUM SERPL-MCNC: 8.7 MG/DL (ref 8.5–10.5)
CHLORIDE SERPL-SCNC: 98 MMOL/L (ref 96–112)
CO2 SERPL-SCNC: 27 MMOL/L (ref 20–33)
CREAT SERPL-MCNC: 0.8 MG/DL (ref 0.5–1.4)
ERYTHROCYTE [DISTWIDTH] IN BLOOD BY AUTOMATED COUNT: 43.3 FL (ref 35.9–50)
GLOBULIN SER CALC-MCNC: 3.6 G/DL (ref 1.9–3.5)
GLUCOSE BLD-MCNC: 164 MG/DL (ref 65–99)
GLUCOSE BLD-MCNC: 204 MG/DL (ref 65–99)
GLUCOSE BLD-MCNC: 217 MG/DL (ref 65–99)
GLUCOSE BLD-MCNC: 237 MG/DL (ref 65–99)
GLUCOSE SERPL-MCNC: 194 MG/DL (ref 65–99)
HCT VFR BLD AUTO: 43.5 % (ref 42–52)
HGB BLD-MCNC: 14.6 G/DL (ref 14–18)
MCH RBC QN AUTO: 30.2 PG (ref 27–33)
MCHC RBC AUTO-ENTMCNC: 33.6 G/DL (ref 33.7–35.3)
MCV RBC AUTO: 89.9 FL (ref 81.4–97.8)
PLATELET # BLD AUTO: 307 K/UL (ref 164–446)
PMV BLD AUTO: 9.2 FL (ref 9–12.9)
POTASSIUM SERPL-SCNC: 3.9 MMOL/L (ref 3.6–5.5)
PROT SERPL-MCNC: 6.7 G/DL (ref 6–8.2)
RBC # BLD AUTO: 4.84 M/UL (ref 4.7–6.1)
SODIUM SERPL-SCNC: 137 MMOL/L (ref 135–145)
WBC # BLD AUTO: 17.6 K/UL (ref 4.8–10.8)

## 2020-12-17 PROCEDURE — A9270 NON-COVERED ITEM OR SERVICE: HCPCS | Performed by: INTERNAL MEDICINE

## 2020-12-17 PROCEDURE — 700111 HCHG RX REV CODE 636 W/ 250 OVERRIDE (IP): Performed by: EMERGENCY MEDICINE

## 2020-12-17 PROCEDURE — 85027 COMPLETE CBC AUTOMATED: CPT

## 2020-12-17 PROCEDURE — 99232 SBSQ HOSP IP/OBS MODERATE 35: CPT | Performed by: HOSPITALIST

## 2020-12-17 PROCEDURE — 82962 GLUCOSE BLOOD TEST: CPT | Mod: 91

## 2020-12-17 PROCEDURE — 80053 COMPREHEN METABOLIC PANEL: CPT

## 2020-12-17 PROCEDURE — 99291 CRITICAL CARE FIRST HOUR: CPT | Performed by: INTERNAL MEDICINE

## 2020-12-17 PROCEDURE — 700102 HCHG RX REV CODE 250 W/ 637 OVERRIDE(OP): Performed by: INTERNAL MEDICINE

## 2020-12-17 PROCEDURE — 700111 HCHG RX REV CODE 636 W/ 250 OVERRIDE (IP): Performed by: HOSPITALIST

## 2020-12-17 PROCEDURE — A9270 NON-COVERED ITEM OR SERVICE: HCPCS | Performed by: HOSPITALIST

## 2020-12-17 PROCEDURE — 94640 AIRWAY INHALATION TREATMENT: CPT

## 2020-12-17 PROCEDURE — 700102 HCHG RX REV CODE 250 W/ 637 OVERRIDE(OP): Performed by: HOSPITALIST

## 2020-12-17 PROCEDURE — 770022 HCHG ROOM/CARE - ICU (200)

## 2020-12-17 RX ORDER — POTASSIUM CHLORIDE 20 MEQ/1
40 TABLET, EXTENDED RELEASE ORAL ONCE
Status: COMPLETED | OUTPATIENT
Start: 2020-12-17 | End: 2020-12-17

## 2020-12-17 RX ORDER — LORAZEPAM 2 MG/ML
0.5 INJECTION INTRAMUSCULAR ONCE
Status: COMPLETED | OUTPATIENT
Start: 2020-12-17 | End: 2020-12-17

## 2020-12-17 RX ORDER — DEXTROSE MONOHYDRATE 25 G/50ML
50 INJECTION, SOLUTION INTRAVENOUS
Status: DISCONTINUED | OUTPATIENT
Start: 2020-12-17 | End: 2021-01-09 | Stop reason: HOSPADM

## 2020-12-17 RX ADMIN — FUROSEMIDE 20 MG: 10 INJECTION, SOLUTION INTRAMUSCULAR; INTRAVENOUS at 17:25

## 2020-12-17 RX ADMIN — DEXAMETHASONE 6 MG: 4 TABLET ORAL at 06:20

## 2020-12-17 RX ADMIN — GABAPENTIN 400 MG: 400 CAPSULE ORAL at 06:20

## 2020-12-17 RX ADMIN — ENOXAPARIN SODIUM 40 MG: 40 INJECTION SUBCUTANEOUS at 06:19

## 2020-12-17 RX ADMIN — LORAZEPAM 0.5 MG: 2 INJECTION INTRAMUSCULAR; INTRAVENOUS at 11:00

## 2020-12-17 RX ADMIN — ESCITALOPRAM OXALATE 10 MG: 10 TABLET ORAL at 06:20

## 2020-12-17 RX ADMIN — GABAPENTIN 400 MG: 400 CAPSULE ORAL at 17:25

## 2020-12-17 RX ADMIN — INSULIN HUMAN 4 UNITS: 100 INJECTION, SOLUTION PARENTERAL at 13:38

## 2020-12-17 RX ADMIN — INSULIN HUMAN 4 UNITS: 100 INJECTION, SOLUTION PARENTERAL at 17:25

## 2020-12-17 RX ADMIN — INSULIN HUMAN 4 UNITS: 100 INJECTION, SOLUTION PARENTERAL at 20:42

## 2020-12-17 RX ADMIN — INSULIN HUMAN 1 UNITS: 100 INJECTION, SOLUTION PARENTERAL at 06:20

## 2020-12-17 RX ADMIN — FUROSEMIDE 20 MG: 10 INJECTION, SOLUTION INTRAMUSCULAR; INTRAVENOUS at 06:07

## 2020-12-17 RX ADMIN — OMEPRAZOLE 20 MG: 20 CAPSULE, DELAYED RELEASE ORAL at 06:19

## 2020-12-17 ASSESSMENT — ENCOUNTER SYMPTOMS
NECK PAIN: 0
DIZZINESS: 0
HEADACHES: 0
COUGH: 1
DIARRHEA: 0
ABDOMINAL PAIN: 0
SORE THROAT: 0
NERVOUS/ANXIOUS: 1
FEVER: 0
NAUSEA: 0
SHORTNESS OF BREATH: 1
BACK PAIN: 0

## 2020-12-17 NOTE — PROGRESS NOTES
Report received, chart check performed, care assumed of pt. Call light within reach, bed alarm set. Utilizing  ipad to communicate with pt.

## 2020-12-17 NOTE — PROGRESS NOTES
Hospital Medicine Daily Progress Note    Date of Service  12/17/2020    Chief Complaint  Short of breath with diarrhea, vomiting x 2 weeks.    Hospital Course  Kiswahili speaking 55 y.o. male with DMII, neuropathy, admitted 12/8/2020 with COVID-19 infection requiring ICU hospitalization due to O2 demand.    Interval Problem Update  12/15: On high flow NC (60L at 100%). On day 7 of 10 Decadron.  Tolerating diet.     12/16: Alert but ongoing respiratory distress.  Acute worsening this am with sinus tachycardia on telemetry and worsening oxygen saturation.  An ABG was requested and showed pH:7.46/pCO2:34.5/pO2:48.  I alerted Dr Mayo for possible need of bipap or intubation.    12/17: Still tenuous from a respiratory standpoint.  He is anxious at times and a bit scared.  Used  via iPad to communicate with him this morning.  He remains on 90% FiO2 with 60 L O2.    Consultants/Specialty  Pulmonary    Code Status  Full Code    Disposition  Transfer to COVID floor.    Review of Systems  Review of Systems   Constitutional: Positive for malaise/fatigue. Negative for fever.   HENT: Negative for sore throat.    Respiratory: Positive for cough and shortness of breath.    Cardiovascular: Negative for chest pain and leg swelling.   Gastrointestinal: Negative for abdominal pain, diarrhea and nausea.   Genitourinary: Negative for dysuria.   Musculoskeletal: Negative for back pain and neck pain.   Neurological: Negative for dizziness and headaches.   Psychiatric/Behavioral: The patient is nervous/anxious.         Physical Exam  Temp:  [36.5 °C (97.7 °F)-37.1 °C (98.7 °F)] 36.6 °C (97.9 °F)  Pulse:  [] 117  Resp:  [5-49] 22  BP: ()/(47-98) 147/98  SpO2:  [82 %-96 %] 93 %    Physical Exam  Vitals signs reviewed.   Constitutional:       General: He is in acute distress.      Appearance: He is ill-appearing. He is not diaphoretic.   HENT:      Head: Normocephalic and atraumatic.      Nose: Nose normal.       Mouth/Throat:      Mouth: Mucous membranes are moist.      Pharynx: No oropharyngeal exudate.   Eyes:      General:         Right eye: No discharge.         Left eye: No discharge.      Extraocular Movements: Extraocular movements intact.      Conjunctiva/sclera:      Right eye: Hemorrhage present.      Left eye: Hemorrhage present.   Neck:      Musculoskeletal: No neck rigidity.   Cardiovascular:      Rate and Rhythm: Regular rhythm. Tachycardia present.      Pulses:           Radial pulses are 2+ on the right side and 2+ on the left side.        Dorsalis pedis pulses are 2+ on the right side and 2+ on the left side.      Heart sounds: No murmur.   Pulmonary:      Effort: Respiratory distress present.      Breath sounds: Rhonchi present. No wheezing.   Abdominal:      General: Bowel sounds are normal. There is no distension.      Palpations: Abdomen is soft.      Tenderness: There is no abdominal tenderness.   Musculoskeletal:         General: No swelling or tenderness.      Right lower leg: No edema.      Left lower leg: No edema.   Skin:     Coloration: Skin is not jaundiced.      Findings: No bruising.   Neurological:      General: No focal deficit present.      Mental Status: He is alert and oriented to person, place, and time. Mental status is at baseline.      Cranial Nerves: No cranial nerve deficit.   Psychiatric:         Mood and Affect: Mood normal.         Fluids    Intake/Output Summary (Last 24 hours) at 12/17/2020 1247  Last data filed at 12/17/2020 0900  Gross per 24 hour   Intake 200 ml   Output 1800 ml   Net -1600 ml       Laboratory  Recent Labs     12/15/20  0550 12/16/20  0538 12/17/20  0430   WBC 16.5* 20.5* 17.6*   RBC 4.65* 5.05 4.84   HEMOGLOBIN 14.0 15.2 14.6   HEMATOCRIT 42.0 45.7 43.5   MCV 90.3 90.5 89.9   MCH 30.1 30.1 30.2   MCHC 33.3* 33.3* 33.6*   RDW 43.6 43.8 43.3   PLATELETCT 349 347 307   MPV 9.1 9.5 9.2     Recent Labs     12/16/20  0538 12/16/20  1000 12/17/20  0430   SODIUM  137 133* 137   POTASSIUM 4.0 3.8 3.9   CHLORIDE 96 93* 98   CO2 29 25 27   GLUCOSE 153* 290* 194*   BUN 27* 28* 30*   CREATININE 0.73 0.79 0.80   CALCIUM 8.9 8.3* 8.7                   Imaging  US-EXTREMITY VENOUS LOWER BILAT   Final Result      DX-CHEST-PORTABLE (1 VIEW)   Final Result      No significant change in Covid pneumonia.      US-EXTREMITY VENOUS LOWER BILAT   Final Result      EC-ECHOCARDIOGRAM LTD W/O CONT   Final Result      DX-CHEST-LIMITED (1 VIEW)   Final Result      No significant change in bilateral pneumonia.      DX-CHEST-LIMITED (1 VIEW)   Final Result         Persistent bilateral pulmonary opacities consistent with Covid 19 pneumonia.      DX-CHEST-PORTABLE (1 VIEW)   Final Result      Bilateral peripheral pulmonary airspace process highly suspicious for Covid pneumonia           Assessment/Plan  * Pneumonia due to COVID-19 virus  Assessment & Plan  Tested COVID-19 positive on 12/8  Patient is on high flow,  Decadron 6 mg p.o. daily until 12/17  S/p 5 days course of remdesivir with last dose 12/13.  continue incentive spirometer, provide Zn, vitamin D and ascorbic acid  Encourage proning and mobilization  Patient was treated with empiric IV antibiotics.  WBC and procalcitonin have been downtrending  Ultrasound negative for DVT in bilateral lower extremities, echocardiogram reveals normal LVEF    Elevated d-dimer- (present on admission)  Assessment & Plan  LE US negative for DVT  Echo from 12/11 reviewed with no right heart failure.    Leukocytosis  Assessment & Plan  12/17 mild decrease in WBC  monitor vitals.  Continue to monitor    Abnormal LFTs  Assessment & Plan  Suspect due to covid 19   Monitoring CMP (last checked 12/13)  12/17 LFTs showing improvement.  Likely viral etiology    Sepsis (HCC)  Assessment & Plan  This is Severe Sepsis Present on admission  SIRS criteria identified on my evaluation include: Tachycardia, with heart rate greater than 90 BPM, Tachypnea, with respirations  greater than 20 per minute and Leukocytosis, with WBC greater than 12,000  Source of infection is pulmonary  Clinical indicators of end organ dysfunction include Lactate >2 mmol/L (18.0 mg/dL)  Sepsis protocol initiated  Fluid resuscitation ordered per protocol  IV antibiotics as appropriate for source of sepsis  Reassessment: I have reassessed the patient's hemodynamic status  End organ dysfunction include(s):  Acute respiratory failure           Acute hypoxemic respiratory failure (HCC)- (present on admission)  Assessment & Plan  Likely  secondary to Covid pneumonia superimposed to the patient with pneumonia  Status post course of 10 days Decadron  WBC slowly downtrending  Encourage incentive spirometer, self proning, titrate oxygen as needed  Currently on high flow nasal canula 90% FiO2 at 60 L  COVID + as of 12/8    Neuropathic pain- (present on admission)  Assessment & Plan  gabapentin       VTE prophylaxis: Enoxaparin 40mg daily

## 2020-12-17 NOTE — PROGRESS NOTES
Critical Care Progress Note  (COVID-19 Crisis Standards of Care Documentation)    Date of Service: 12/16/2020    Date of Admission:  12/8/2020  3:25 PM    CC/HPI: Singh Hall is a 55 y.o. male with PMH of type 2 diabetes mellitus and neuropathy presented 12/8 with dyspnea, malaise, N/V/D.  Admitted to telemetry floor on HFNC, treated with Decadron and remdesivir, transferred to ICU 12/11 for increasing dyspnea and oxygen requirement and melanotic stool.  Melena resolved stable ADILIA globin on PPI.      Hospital Course:  12/11 transfer to ICU for increasing O2 requirement; ,melanotic stool now on PPI  12/12 continues to require HFNC + NRB; no further melanotic stools; start diet; start enoxaparin  12/16 -worsened P/F on HFNC plus NRB,  12/17 -anxious but oriented x4, HFNC but off NRB, still with intermittent respiratory distress    Interval Problem Update:  A/o x 4, anxious   Poor po intake  SR/St  I/O = ?/2.0  No cnotreras  PIVs  Tm = 98.7  HFNC 60/100, no NRB  lovenox ppx  completeing decadron  Completed all abx      Physical Examination  General: Intermittently confused, moderate distress  Neuro/Psych: As above moves all extremities  HEENT: PERRL  CVS: SR/ST  Respiratory: Diminished throughout, HFNC  Abdomen/: Soft, NT  Extremities: 2+ edema  Skin: Warm, dry    Vitals:    12/17/20 1000   BP:    Pulse:    Resp:    Temp: 36.6 °C (97.9 °F)   SpO2:               Intake/Output Summary (Last 24 hours) at 12/16/2020 0932  Last data filed at 12/16/2020 0800  Gross per 24 hour   Intake 250 ml   Output 1740 ml   Net -1490 ml       Recent Labs     12/15/20  0550 12/16/20  0538 12/16/20  1000 12/17/20  0430   WBC 16.5* 20.5*  --  17.6*   ASTSGOT  --   --  37 35   ALTSGPT  --   --  38 36   ALKPHOSPHAT  --   --  166* 150*   TBILIRUBIN  --   --  0.6 0.5     Recent Labs     12/15/20  0550 12/16/20  0538 12/16/20  1000 12/17/20  0430   SODIUM 137 137 133* 137   POTASSIUM 3.7 4.0 3.8 3.9   CHLORIDE 96 96 93* 98   CO2 29 29 25 27    BUN 23* 27* 28* 30*   CREATININE 0.80 0.73 0.79 0.80   MAGNESIUM 2.4  --   --   --    PHOSPHORUS 2.6  --   --   --    CALCIUM 8.7 8.9 8.3* 8.7     Recent Labs     12/16/20  0538 12/16/20  1000 12/17/20  0430   ALTSGPT  --  38 36   ASTSGOT  --  37 35   ALKPHOSPHAT  --  166* 150*   TBILIRUBIN  --  0.6 0.5   GLUCOSE 153* 290* 194*     Recent Labs     12/16/20  0913   ISTATAPH 7.465   ISTATAPCO2 34.5   ISTATAPO2 52*   ISTATATCO2 26   [id number] 89*   ISTATARTHCO3 24.8   ISTATARTBE 2   ISTATTEMP 96.4 F   ISTATFIO2 100   ISTATSPEC Arterial   ISTATAPHTC 7.483   [id number] 48*     US-EXTREMITY VENOUS LOWER BILAT   Final Result      DX-CHEST-PORTABLE (1 VIEW)   Final Result      No significant change in Covid pneumonia.      US-EXTREMITY VENOUS LOWER BILAT   Final Result      EC-ECHOCARDIOGRAM LTD W/O CONT   Final Result      DX-CHEST-LIMITED (1 VIEW)   Final Result      No significant change in bilateral pneumonia.      DX-CHEST-LIMITED (1 VIEW)   Final Result         Persistent bilateral pulmonary opacities consistent with Covid 19 pneumonia.      DX-CHEST-PORTABLE (1 VIEW)   Final Result      Bilateral peripheral pulmonary airspace process highly suspicious for Covid pneumonia          Active Hospital Problem List:  Principal Problem:    Pneumonia due to COVID-19 virus POA: Unknown  Active Problems:    Neuropathic pain POA: Yes    Acute hypoxemic respiratory failure (HCC) POA: Yes    Lactic acidosis POA: Yes    Abnormal LFTs POA: Unknown    Leukocytosis POA: Unknown    Elevated d-dimer POA: Yes    Melena POA: Yes  Resolved Problems:    Hyponatremia POA: Yes    Type 2 diabetes mellitus without complication (HCC) POA: Yes    Agitation POA: Yes      Updated Plan:  * Pneumonia due to COVID-19 virus  Assessment & Plan  COVID-19 pneumonia  Dexamethasone - Day 3 of 10  Remdesivir - Day 3 of 5  Fluid balance - keep euvolemic  Encourage self-proning  Ampicillin-sulbactam for possible cobacterial infection (procalcitonin  3.42)     Melena- (present on admission)  Assessment & Plan  Melena x1, H/H stable  Omeprazole  Serial CBC     Elevated d-dimer- (present on admission)  Assessment & Plan  D-Dimer > 20  Lower extremity doppler negative for VTE     Abnormal LFTs  Assessment & Plan  Slowly improving, monitor while on remdesivir     Sepsis (HCC)- (present on admission)  Assessment & Plan  This is Severe Sepsis Present on admission  SIRS criteria identified on my evaluation include: Fever, with temperature greater than 101 deg F, Tachycardia, with heart rate greater than 90 BPM, Tachypnea, with respirations greater than 20 per minute and Leukocytosis, with WBC greater than 12,000  Source of infection is pulmonary  Clinical indicators of end organ dysfunction include Acute respiratory failure as evidenced by a new need for invasive or non-invasive mechanical ventilation (Ventilator or BiPap)   Sepsis protocol initiated  Fluid resuscitation ordered per protocol  IV antibiotics as appropriate for source of sepsis  Reassessment: I have reassessed the patient's hemodynamic status  End organ dysfunction include(s):  Acute respiratory failure     Fluid resuscitation - judicious fluids given COVID-19  Antimicrobials -ampicillin-sulbactam     Lactic acidosis- (present on admission)  Assessment & Plan  Mild, likely 2/2 sepsis and WOB  Judicious IVF use given COVID-19     Acute hypoxemic respiratory failure (HCC)- (present on admission)  Assessment & Plan  Secondary to COVID-19 ARDS  HOB > 30  Early mobility  Titrate supplemental FiO2 to maintain SpO2 >85%  Encourage self-proning  Aggressive pulmonary hygiene        Neuropathic pain- (present on admission)  Assessment & Plan  Consider gabapentin if needed    Updated plan:  Continue HFNC, monitor closely in ICU, may require intubation, reviewed with hospitalist in detail.  Will follow.    Alexys Mayo M.D.  [facility]  The patient remains critically ill.  Critical care time = 34  minutes in directly providing and coordinating critical care and extensive data review.  No time overlap and excludes procedures.

## 2020-12-18 LAB
ANION GAP SERPL CALC-SCNC: 12 MMOL/L (ref 7–16)
BUN SERPL-MCNC: 36 MG/DL (ref 8–22)
CALCIUM SERPL-MCNC: 8.9 MG/DL (ref 8.5–10.5)
CHLORIDE SERPL-SCNC: 97 MMOL/L (ref 96–112)
CO2 SERPL-SCNC: 26 MMOL/L (ref 20–33)
CREAT SERPL-MCNC: 0.79 MG/DL (ref 0.5–1.4)
ERYTHROCYTE [DISTWIDTH] IN BLOOD BY AUTOMATED COUNT: 43.8 FL (ref 35.9–50)
GLUCOSE BLD-MCNC: 160 MG/DL (ref 65–99)
GLUCOSE BLD-MCNC: 190 MG/DL (ref 65–99)
GLUCOSE BLD-MCNC: 205 MG/DL (ref 65–99)
GLUCOSE BLD-MCNC: 263 MG/DL (ref 65–99)
GLUCOSE SERPL-MCNC: 199 MG/DL (ref 65–99)
HCT VFR BLD AUTO: 45 % (ref 42–52)
HGB BLD-MCNC: 14.7 G/DL (ref 14–18)
MCH RBC QN AUTO: 30.1 PG (ref 27–33)
MCHC RBC AUTO-ENTMCNC: 32.7 G/DL (ref 33.7–35.3)
MCV RBC AUTO: 92.2 FL (ref 81.4–97.8)
PLATELET # BLD AUTO: 310 K/UL (ref 164–446)
PMV BLD AUTO: 9.3 FL (ref 9–12.9)
POTASSIUM SERPL-SCNC: 4 MMOL/L (ref 3.6–5.5)
RBC # BLD AUTO: 4.88 M/UL (ref 4.7–6.1)
SODIUM SERPL-SCNC: 135 MMOL/L (ref 135–145)
WBC # BLD AUTO: 22.5 K/UL (ref 4.8–10.8)

## 2020-12-18 PROCEDURE — 700111 HCHG RX REV CODE 636 W/ 250 OVERRIDE (IP): Performed by: HOSPITALIST

## 2020-12-18 PROCEDURE — A9270 NON-COVERED ITEM OR SERVICE: HCPCS | Performed by: INTERNAL MEDICINE

## 2020-12-18 PROCEDURE — 94640 AIRWAY INHALATION TREATMENT: CPT

## 2020-12-18 PROCEDURE — 770022 HCHG ROOM/CARE - ICU (200)

## 2020-12-18 PROCEDURE — 700111 HCHG RX REV CODE 636 W/ 250 OVERRIDE (IP): Performed by: EMERGENCY MEDICINE

## 2020-12-18 PROCEDURE — 99291 CRITICAL CARE FIRST HOUR: CPT | Performed by: INTERNAL MEDICINE

## 2020-12-18 PROCEDURE — 80048 BASIC METABOLIC PNL TOTAL CA: CPT

## 2020-12-18 PROCEDURE — 700102 HCHG RX REV CODE 250 W/ 637 OVERRIDE(OP): Performed by: INTERNAL MEDICINE

## 2020-12-18 PROCEDURE — 82962 GLUCOSE BLOOD TEST: CPT | Mod: 91

## 2020-12-18 PROCEDURE — 700111 HCHG RX REV CODE 636 W/ 250 OVERRIDE (IP): Performed by: INTERNAL MEDICINE

## 2020-12-18 PROCEDURE — 85027 COMPLETE CBC AUTOMATED: CPT

## 2020-12-18 PROCEDURE — 99233 SBSQ HOSP IP/OBS HIGH 50: CPT | Performed by: HOSPITALIST

## 2020-12-18 PROCEDURE — 700102 HCHG RX REV CODE 250 W/ 637 OVERRIDE(OP): Performed by: HOSPITALIST

## 2020-12-18 PROCEDURE — A9270 NON-COVERED ITEM OR SERVICE: HCPCS | Performed by: HOSPITALIST

## 2020-12-18 RX ORDER — LORAZEPAM 2 MG/ML
0.5 INJECTION INTRAMUSCULAR ONCE
Status: COMPLETED | OUTPATIENT
Start: 2020-12-18 | End: 2020-12-18

## 2020-12-18 RX ADMIN — FUROSEMIDE 20 MG: 10 INJECTION, SOLUTION INTRAMUSCULAR; INTRAVENOUS at 17:58

## 2020-12-18 RX ADMIN — INSULIN HUMAN 4 UNITS: 100 INJECTION, SOLUTION PARENTERAL at 12:07

## 2020-12-18 RX ADMIN — ENOXAPARIN SODIUM 40 MG: 40 INJECTION SUBCUTANEOUS at 04:50

## 2020-12-18 RX ADMIN — OMEPRAZOLE 20 MG: 20 CAPSULE, DELAYED RELEASE ORAL at 04:52

## 2020-12-18 RX ADMIN — FUROSEMIDE 20 MG: 10 INJECTION, SOLUTION INTRAMUSCULAR; INTRAVENOUS at 04:53

## 2020-12-18 RX ADMIN — ESCITALOPRAM OXALATE 10 MG: 10 TABLET ORAL at 04:51

## 2020-12-18 RX ADMIN — LORAZEPAM 0.5 MG: 2 INJECTION INTRAMUSCULAR; INTRAVENOUS at 11:10

## 2020-12-18 RX ADMIN — INSULIN HUMAN 3 UNITS: 100 INJECTION, SOLUTION PARENTERAL at 08:19

## 2020-12-18 RX ADMIN — INSULIN HUMAN 3 UNITS: 100 INJECTION, SOLUTION PARENTERAL at 20:33

## 2020-12-18 RX ADMIN — INSULIN HUMAN 7 UNITS: 100 INJECTION, SOLUTION PARENTERAL at 18:01

## 2020-12-18 RX ADMIN — GABAPENTIN 400 MG: 400 CAPSULE ORAL at 18:00

## 2020-12-18 ASSESSMENT — FIBROSIS 4 INDEX: FIB4 SCORE: 1

## 2020-12-18 NOTE — CARE PLAN
Problem: Nutritional:  Goal: Achieve adequate nutritional intake  Description: Patient will consume 50% of meals + supplements  Outcome: PROGRESSING SLOWER THAN EXPECTED

## 2020-12-18 NOTE — DISCHARGE PLANNING
Care Transition Team Discharge Planning    Anticipated Discharge Disposition: TBD    Action: Lsw received IPCSS requesting assistance w/ an Advanced Directive.    Lsw noted pt completed a recent AD. It was notarized and scanned into EP[facility] medica tab on 12.17.20. Pt's POA is his sister Nadia Mcintyre @ phone [phone] [she is English speaking & pt is not]. The 1st alternate is Dakota Hall no relation or number listed. Lsw was unable to locate any further information in chart regarding this individual either.    Lsw cancelled the IPCSS as it has been completed.     Pt's sister, Aleyda [Moh[place] speaking only], has asked to please apply for Emergency Medicaid for pt, and RN CLAUDIA sent email request on 12.11.20. Per PFA note, they completed the Medicaid application and mailed it to pt's sister, Aleyda, for signature today.      Barriers to Discharge: pt is not a citizen and has no medical insurance other than Access to Healthcare    Plan: Care coordination to follow, acquire medical updates, and be available for d/c planning once patient is medically stable.

## 2020-12-18 NOTE — DIETARY
Nutrition Services: Day 10 of admit.  Singh Hall is a 55 y.o. male with admitting DX of COVID-19    RD has been following pt since 12/11 for poor PO intake in the setting of high-risk Dx of COVID-19.    Assessment:  Height: 152.4 cm (5')  Weight: 55.8 kg (123 lb 0.3 oz)  Body mass index is 24.03 kg/m²., BMI classification: Normal    Diet: Consistent CHO with Boost Glucose Control supplements TID.    Evaluation:   1. Weight obtained today after not being recorded since admit.  2. Weight down 11 kg in 10 days: SEVERE 16.5% weight loss in 10 days.   3. -11.2 L fluid per I/O. However, this may not be entirely accurate, as strict I/O are typically not measured outside of the ICU, and pt was on T7 from 12/8 to 12/11. Pt is currently receiving lasix BID per MAR.  4. PO intake has been inadequate for at least 5 days per ADLs:  Today 12/18: 25-50% breakfast  12/17: Sips/refused  12/16: No records  12/15: 50-75% breakfast, 25-50% lunch  12/14: No records  12/13: Refused  5. BASED ON THESE RECORDS, PT HAS CONSUMED </=50% OF 3 TOTAL MEALS IN 5 DAYS.  6. No specific records of Boost intake.  7. Nutrition Representative attempts to call pt daily for menu selections, but pt typically does not answer the calls.     Malnutrition Risk: Pt meets criteria for severe acute illness related malnutrition in the setting of COVID-19 as evidenced by severe 16.5% weight loss in 10 days and energy intake of </=50% for >/=5 days.    Recommendations/Plan:  1. Strongly recommend consideration of TF.  2. Encourage PO intake of meals and supplements and document any and all PO in ADLs.  3. Monitor weight regularly.  4. Nutrition Rep will continue to call pt for ongoing meal and snack preferences; please encourage him to answer calls and/or help facilitate.    RD following.

## 2020-12-18 NOTE — PALLIATIVE CARE
Palliative Care follow-up  AD notarized today. AD scanned into EMR. Pt apionted his sister, Nadia Hall as his HC-DPOA. He did not choose any end of life care preferences. Original with one copy returned to pt.     Thank you for allowing Palliative Care to support this patient and family. Contact [phone] for additional assistance, change in patient status, or with any questions/concerns.

## 2020-12-18 NOTE — DISCHARGE PLANNING
Care Transition Team Discharge Planning    Anticipated Discharge Disposition: TBD    Action: Per rounds, Dr Kong indicates pt is not medically stable for d/c. Pt speaks Thai only.     Barriers to Discharge: TBD    Plan: f/u w/ medical team, and continue d/c planning as needs arise when pt is medically stable

## 2020-12-18 NOTE — PROGRESS NOTES
Critical Care Progress Note  (COVID-19 Crisis Standards of Care Documentation)    Date of Service: 12/16/2020    Date of Admission:  12/8/2020  3:25 PM    CC/HPI: Singh Hall is a 55 y.o. male with PMH of type 2 diabetes mellitus and neuropathy presented 12/8 with dyspnea, malaise, N/V/D.  Admitted to telemetry floor on HFNC, treated with Decadron and remdesivir, transferred to ICU 12/11 for increasing dyspnea and oxygen requirement and melanotic stool.  Melena resolved stable ADILIA globin on PPI.      Hospital Course:  12/11 transfer to ICU for increasing O2 requirement; ,melanotic stool now on PPI  12/12 continues to require HFNC + NRB; no further melanotic stools; start diet; start enoxaparin  12/16 -worsened P/F on HFNC plus NRB,  12/17 -anxious but oriented x4, HFNC but off NRB, still with intermittent respiratory distress  12/18 - HFNC 70/100, int NRB, int agitated; lovenox ppx, lasix bid, off steroids    Interval Problem Update:  HFNC 70/100, int NRB  SpO2 mid 80's  Anxious  Tachy   emerald PO intake  I/O   Tm   lovenox ppx  prilosec qd  No abx  Lasix 20 BID  Lytes OK  Off steroids since yesterday  SSI,       Physical Examination  General: Intermittently confused, moderate distress  Neuro/Psych: As above moves all extremities  HEENT: PERRL  CVS: SR/ST  Respiratory: Diminished throughout, HFNC  Abdomen/: Soft, NT  Extremities: 2+ edema  Skin: Warm, dry    Vitals:    12/18/20 0800   BP: 126/74   Pulse: (!) 109   Resp: (!) 28   Temp: 36.6 °C (97.8 °F)   SpO2: 90%              Intake/Output Summary (Last 24 hours) at 12/16/2020 0932  Last data filed at 12/16/2020 0800  Gross per 24 hour   Intake 250 ml   Output 1740 ml   Net -1490 ml       Recent Labs     12/16/20  0538 12/16/20  1000 12/17/20  0430 12/18/20  0245   WBC 20.5*  --  17.6* 22.5*   ASTSGOT  --  37 35  --    ALTSGPT  --  38 36  --    ALKPHOSPHAT  --  166* 150*  --    TBILIRUBIN  --  0.6 0.5  --      Recent Labs     12/16/20  1000 12/17/20  0433  12/18/20  0245   SODIUM 133* 137 135   POTASSIUM 3.8 3.9 4.0   CHLORIDE 93* 98 97   CO2 25 27 26   BUN 28* 30* 36*   CREATININE 0.79 0.80 0.79   CALCIUM 8.3* 8.7 8.9     Recent Labs     12/16/20  1000 12/17/20  0430 12/18/20  0245   ALTSGPT 38 36  --    ASTSGOT 37 35  --    ALKPHOSPHAT 166* 150*  --    TBILIRUBIN 0.6 0.5  --    GLUCOSE 290* 194* 199*     Recent Labs     12/16/20  0913   ISTATAPH 7.465   ISTATAPCO2 34.5   ISTATAPO2 52*   ISTATATCO2 26   HEBYEIB1UXO 89*   ISTATARTHCO3 24.8   ISTATARTBE 2   ISTATTEMP 96.4 F   ISTATFIO2 100   ISTATSPEC Arterial   ISTATAPHTC 7.483   DVACKEUA3SK 48*     US-EXTREMITY VENOUS LOWER BILAT   Final Result      DX-CHEST-PORTABLE (1 VIEW)   Final Result      No significant change in Covid pneumonia.      US-EXTREMITY VENOUS LOWER BILAT   Final Result      EC-ECHOCARDIOGRAM LTD W/O CONT   Final Result      DX-CHEST-LIMITED (1 VIEW)   Final Result      No significant change in bilateral pneumonia.      DX-CHEST-LIMITED (1 VIEW)   Final Result         Persistent bilateral pulmonary opacities consistent with Covid 19 pneumonia.      DX-CHEST-PORTABLE (1 VIEW)   Final Result      Bilateral peripheral pulmonary airspace process highly suspicious for Covid pneumonia          Active Hospital Problem List:  Principal Problem:    Pneumonia due to COVID-19 virus POA: Unknown  Active Problems:    Neuropathic pain POA: Yes    Acute hypoxemic respiratory failure (HCC) POA: Yes    Abnormal LFTs POA: Unknown    Leukocytosis POA: Unknown    Elevated d-dimer POA: Yes    Melena POA: Yes  Resolved Problems:    Lactic acidosis POA: Yes    Hyponatremia POA: Yes    Type 2 diabetes mellitus without complication (HCC) POA: Yes    Agitation POA: Yes      Updated Plan:  * Pneumonia due to COVID-19 virus  Assessment & Plan  COVID-19 pneumonia  Dexamethasone - completed  Remdesivir - completed  Fluid balance - keep euvolemic; lasix  Encourage self-proning  S/p course of abx     Melena- (present on  admission)  Assessment & Plan  Melena x1, H/H stable  Omeprazole  stable     Elevated d-dimer- (present on admission)  Assessment & Plan  D-Dimer > 20, on ppx lovenox  Lower extremity doppler negative for VTE     Abnormal LFTs  Assessment & Plan  improving     Sepsis (HCC)- (present on admission)  Assessment & Plan  2/2 COVID, improved      Acute hypoxemic respiratory failure (HCC)- (present on admission)  Assessment & Plan  Secondary to COVID-19 ARDS  HOB > 30  Early mobility  Titrate supplemental FiO2 to maintain SpO2 >85%  Encourage self-proning  Aggressive pulmonary hygiene  HFNC  May require intubation; follow closely in ICU      Alexys Mayo M.D.  [facility]  The patient remains critically ill.  Critical care time = 35 minutes in directly providing and coordinating critical care and extensive data review.  No time overlap and excludes procedures.

## 2020-12-19 LAB
ANION GAP SERPL CALC-SCNC: 10 MMOL/L (ref 7–16)
BUN SERPL-MCNC: 29 MG/DL (ref 8–22)
CALCIUM SERPL-MCNC: 9.1 MG/DL (ref 8.5–10.5)
CHLORIDE SERPL-SCNC: 95 MMOL/L (ref 96–112)
CO2 SERPL-SCNC: 32 MMOL/L (ref 20–33)
CREAT SERPL-MCNC: 0.84 MG/DL (ref 0.5–1.4)
ERYTHROCYTE [DISTWIDTH] IN BLOOD BY AUTOMATED COUNT: 43.1 FL (ref 35.9–50)
GLUCOSE BLD-MCNC: 112 MG/DL (ref 65–99)
GLUCOSE BLD-MCNC: 145 MG/DL (ref 65–99)
GLUCOSE BLD-MCNC: 168 MG/DL (ref 65–99)
GLUCOSE BLD-MCNC: 196 MG/DL (ref 65–99)
GLUCOSE SERPL-MCNC: 101 MG/DL (ref 65–99)
HCT VFR BLD AUTO: 48.5 % (ref 42–52)
HGB BLD-MCNC: 15.8 G/DL (ref 14–18)
MCH RBC QN AUTO: 29.6 PG (ref 27–33)
MCHC RBC AUTO-ENTMCNC: 32.6 G/DL (ref 33.7–35.3)
MCV RBC AUTO: 91 FL (ref 81.4–97.8)
PLATELET # BLD AUTO: 330 K/UL (ref 164–446)
PMV BLD AUTO: 9.5 FL (ref 9–12.9)
POTASSIUM SERPL-SCNC: 3.7 MMOL/L (ref 3.6–5.5)
RBC # BLD AUTO: 5.33 M/UL (ref 4.7–6.1)
SODIUM SERPL-SCNC: 137 MMOL/L (ref 135–145)
WBC # BLD AUTO: 22 K/UL (ref 4.8–10.8)

## 2020-12-19 PROCEDURE — 80048 BASIC METABOLIC PNL TOTAL CA: CPT

## 2020-12-19 PROCEDURE — 82962 GLUCOSE BLOOD TEST: CPT | Mod: 91

## 2020-12-19 PROCEDURE — 94760 N-INVAS EAR/PLS OXIMETRY 1: CPT

## 2020-12-19 PROCEDURE — A9270 NON-COVERED ITEM OR SERVICE: HCPCS | Performed by: HOSPITALIST

## 2020-12-19 PROCEDURE — 99232 SBSQ HOSP IP/OBS MODERATE 35: CPT | Performed by: HOSPITALIST

## 2020-12-19 PROCEDURE — A9270 NON-COVERED ITEM OR SERVICE: HCPCS | Performed by: INTERNAL MEDICINE

## 2020-12-19 PROCEDURE — 99291 CRITICAL CARE FIRST HOUR: CPT | Performed by: INTERNAL MEDICINE

## 2020-12-19 PROCEDURE — 700111 HCHG RX REV CODE 636 W/ 250 OVERRIDE (IP): Performed by: HOSPITALIST

## 2020-12-19 PROCEDURE — 700111 HCHG RX REV CODE 636 W/ 250 OVERRIDE (IP): Performed by: EMERGENCY MEDICINE

## 2020-12-19 PROCEDURE — 85027 COMPLETE CBC AUTOMATED: CPT

## 2020-12-19 PROCEDURE — 94640 AIRWAY INHALATION TREATMENT: CPT

## 2020-12-19 PROCEDURE — 700102 HCHG RX REV CODE 250 W/ 637 OVERRIDE(OP): Performed by: INTERNAL MEDICINE

## 2020-12-19 PROCEDURE — 700102 HCHG RX REV CODE 250 W/ 637 OVERRIDE(OP): Performed by: HOSPITALIST

## 2020-12-19 PROCEDURE — 770022 HCHG ROOM/CARE - ICU (200)

## 2020-12-19 RX ORDER — POTASSIUM CHLORIDE 20 MEQ/1
40 TABLET, EXTENDED RELEASE ORAL ONCE
Status: COMPLETED | OUTPATIENT
Start: 2020-12-19 | End: 2020-12-19

## 2020-12-19 RX ADMIN — INSULIN HUMAN 3 UNITS: 100 INJECTION, SOLUTION PARENTERAL at 18:14

## 2020-12-19 RX ADMIN — GABAPENTIN 400 MG: 400 CAPSULE ORAL at 18:14

## 2020-12-19 RX ADMIN — OMEPRAZOLE 20 MG: 20 CAPSULE, DELAYED RELEASE ORAL at 06:01

## 2020-12-19 RX ADMIN — ENOXAPARIN SODIUM 40 MG: 40 INJECTION SUBCUTANEOUS at 06:01

## 2020-12-19 RX ADMIN — FUROSEMIDE 20 MG: 10 INJECTION, SOLUTION INTRAMUSCULAR; INTRAVENOUS at 18:14

## 2020-12-19 RX ADMIN — INSULIN HUMAN 3 UNITS: 100 INJECTION, SOLUTION PARENTERAL at 20:03

## 2020-12-19 RX ADMIN — ESCITALOPRAM OXALATE 10 MG: 10 TABLET ORAL at 06:01

## 2020-12-19 RX ADMIN — GABAPENTIN 400 MG: 400 CAPSULE ORAL at 06:01

## 2020-12-19 RX ADMIN — FUROSEMIDE 20 MG: 10 INJECTION, SOLUTION INTRAMUSCULAR; INTRAVENOUS at 06:01

## 2020-12-19 RX ADMIN — POTASSIUM CHLORIDE 40 MEQ: 1500 TABLET, EXTENDED RELEASE ORAL at 08:41

## 2020-12-19 ASSESSMENT — FIBROSIS 4 INDEX: FIB4 SCORE: 0.94

## 2020-12-19 NOTE — PROGRESS NOTES
Report recd from Linda AKHTAR. Orders reviewed. Pt remains in COVID isolation. Call bell within reach. See full assessment

## 2020-12-19 NOTE — PROGRESS NOTES
Patient has DTI to bilateral ears, non-blanching and painful to patient. Offloaded with headband and grey foam. Educated patient to keep band hooked on buttons and off ears.

## 2020-12-19 NOTE — PROGRESS NOTES
Hospital Medicine Daily Progress Note    Date of Service  12/19/2020    Chief Complaint  Short of breath with diarrhea, vomiting x 2 weeks.    Hospital Course  Sami speaking 55 y.o. male with DMII, neuropathy, admitted 12/8/2020 with COVID-19 infection requiring ICU hospitalization due to O2 demand.    Interval Problem Update  12/15: On high flow NC (60L at 100%). On day 7 of 10 Decadron.  Tolerating diet.     12/16: Alert but ongoing respiratory distress.  Acute worsening this am with sinus tachycardia on telemetry and worsening oxygen saturation.  An ABG was requested and showed pH:7.46/pCO2:34.5/pO2:48.  I alerted Dr Mayo for possible need of bipap or intubation.    12/17: Still tenuous from a respiratory standpoint.  He is anxious at times and a bit scared.  Used  via iPad to communicate with him this morning.  He remains on 90% FiO2 with 60 L O2.    12/18: On 60L 100% and rapid shallow breathing.  Tearful at times.  Denied pain.  Able to talk in short sentences.  Discussed with Dr Mayo     12/19: Remains on high amounts of oxygen and very weak.  He states he is scared and nervous.  He remains on 60L and 100% via HFNC. I have called his sister and updated her on her brother's status.    Consultants/Specialty  Pulmonary    Code Status  Full Code    Disposition  Transfer to COVID floor.    Review of Systems  Review of Systems   Unable to perform ROS: Severe respiratory distress        Physical Exam  Temp:  [37 °C (98.6 °F)-37.5 °C (99.5 °F)] 37.2 °C (99 °F)  Pulse:  [] 120  Resp:  [3-46] 25  BP: ()/(61-79) 123/76  SpO2:  [79 %-97 %] 97 %    Physical Exam  Vitals signs reviewed.   Constitutional:       General: He is in acute distress.      Appearance: He is ill-appearing. He is not diaphoretic.   HENT:      Head: Normocephalic and atraumatic.      Nose: Nose normal.      Mouth/Throat:      Mouth: Mucous membranes are dry.      Pharynx: No oropharyngeal exudate.   Eyes:      General:          Right eye: No discharge.         Left eye: No discharge.      Extraocular Movements: Extraocular movements intact.      Conjunctiva/sclera:      Right eye: Hemorrhage present.      Left eye: Hemorrhage present.   Neck:      Musculoskeletal: Neck supple.   Cardiovascular:      Rate and Rhythm: Regular rhythm. Tachycardia present.      Pulses:           Radial pulses are 2+ on the right side and 2+ on the left side.        Dorsalis pedis pulses are 2+ on the right side and 2+ on the left side.      Heart sounds: No murmur.   Pulmonary:      Effort: Tachypnea and respiratory distress present.      Breath sounds: Rhonchi present. No wheezing.   Abdominal:      General: Bowel sounds are normal. There is no distension.      Palpations: Abdomen is soft.      Tenderness: There is no abdominal tenderness.   Musculoskeletal:         General: No swelling or tenderness.      Right lower leg: No edema.      Left lower leg: No edema.   Skin:     Coloration: Skin is not jaundiced.      Findings: No bruising.   Neurological:      General: No focal deficit present.      Mental Status: He is alert and oriented to person, place, and time. Mental status is at baseline.      Cranial Nerves: No cranial nerve deficit.   Psychiatric:         Mood and Affect: Mood normal.         Fluids    Intake/Output Summary (Last 24 hours) at 12/19/2020 1526  Last data filed at 12/19/2020 0800  Gross per 24 hour   Intake 740 ml   Output 550 ml   Net 190 ml       Laboratory  Recent Labs     12/17/20  0430 12/18/20  0245 12/19/20  0315   WBC 17.6* 22.5* 22.0*   RBC 4.84 4.88 5.33   HEMOGLOBIN 14.6 14.7 15.8   HEMATOCRIT 43.5 45.0 48.5   MCV 89.9 92.2 91.0   MCH 30.2 30.1 29.6   MCHC 33.6* 32.7* 32.6*   RDW 43.3 43.8 43.1   PLATELETCT 307 310 330   MPV 9.2 9.3 9.5     Recent Labs     12/17/20  0430 12/18/20  0245 12/19/20  0315   SODIUM 137 135 137   POTASSIUM 3.9 4.0 3.7   CHLORIDE 98 97 95*   CO2 27 26 32   GLUCOSE 194* 199* 101*   BUN 30* 36* 29*    CREATININE 0.80 0.79 0.84   CALCIUM 8.7 8.9 9.1                   Imaging  US-EXTREMITY VENOUS LOWER BILAT   Final Result      DX-CHEST-PORTABLE (1 VIEW)   Final Result      No significant change in Covid pneumonia.      US-EXTREMITY VENOUS LOWER BILAT   Final Result      EC-ECHOCARDIOGRAM LTD W/O CONT   Final Result      DX-CHEST-LIMITED (1 VIEW)   Final Result      No significant change in bilateral pneumonia.      DX-CHEST-LIMITED (1 VIEW)   Final Result         Persistent bilateral pulmonary opacities consistent with Covid 19 pneumonia.      DX-CHEST-PORTABLE (1 VIEW)   Final Result      Bilateral peripheral pulmonary airspace process highly suspicious for Covid pneumonia           Assessment/Plan  * Pneumonia due to COVID-19 virus  Assessment & Plan  Tested COVID-19 positive on 12/8  Patient is on high flow,  Decadron 6 mg p.o. daily until 12/17  S/p 5 days course of remdesivir with last dose 12/13.  continue incentive spirometer, provide Zn, vitamin D and ascorbic acid  Encourage proning and mobilization  Patient was treated with empiric IV antibiotics.  WBC and procalcitonin have been downtrending  Ultrasound negative for DVT in bilateral lower extremities, echocardiogram reveals normal LVEF    Leukocytosis  Assessment & Plan  12/19 WBC:22  12/17 WBC:17.6  monitor vitals.  Continue to monitor    Acute hypoxemic respiratory failure (HCC)- (present on admission)  Assessment & Plan  Likely  secondary to Covid pneumonia superimposed to the patient with pneumonia  Status post course of 10 days Decadron  12/19 WBC:22  Encourage incentive spirometer, self proning, titrate oxygen as needed  COVID + as of 12/8    Elevated d-dimer- (present on admission)  Assessment & Plan  12/16 LE US negative for DVT  Echo from 12/11 reviewed with no right heart failure.    Abnormal LFTs  Assessment & Plan  Suspect due to covid 19   Monitoring CMP (last checked 12/13)  12/17 LFTs showing improvement.  Likely viral  etiology    Sepsis (HCC)  Assessment & Plan  This is Severe Sepsis Present on admission  SIRS criteria identified on my evaluation include: Tachycardia, with heart rate greater than 90 BPM, Tachypnea, with respirations greater than 20 per minute and Leukocytosis, with WBC greater than 12,000  Source of infection is pulmonary  Clinical indicators of end organ dysfunction include Lactate >2 mmol/L (18.0 mg/dL)  Sepsis protocol initiated  Fluid resuscitation ordered per protocol  IV antibiotics as appropriate for source of sepsis  Reassessment: I have reassessed the patient's hemodynamic status  End organ dysfunction include(s):  Acute respiratory failure           Neuropathic pain- (present on admission)  Assessment & Plan  gabapentin       VTE prophylaxis: Enoxaparin 40mg daily

## 2020-12-19 NOTE — PROGRESS NOTES
Critical Care Progress Note  (COVID-19 Crisis Standards of Care Documentation)    Date of Service: 12/16/2020    Date of Admission:  12/8/2020  3:25 PM    CC/HPI: Singh Hall is a 55 y.o. male with PMH of type 2 diabetes mellitus and neuropathy presented 12/8 with dyspnea, malaise, N/V/D.  Admitted to telemetry floor on HFNC, treated with Decadron and remdesivir, transferred to ICU 12/11 for increasing dyspnea and oxygen requirement and melanotic stool.  Melena resolved stable ADILIA globin on PPI.      Hospital Course:  12/11 transfer to ICU for increasing O2 requirement; ,melanotic stool now on PPI  12/12 continues to require HFNC + NRB; no further melanotic stools; start diet; start enoxaparin  12/16 -worsened P/F on HFNC plus NRB,  12/17 -anxious but oriented x4, HFNC but off NRB, still with intermittent respiratory distress  12/18 - HFNC 70/100, int NRB, int agitated; lovenox ppx, lasix bid, off steroids  12/19 - HFNC60/100, off NRB now, improving SpO2, now off steroids     Interval Problem Update:  HFNC 60/100, off NRB  SpO2 better  SR/ST  A/o x 4, follows  Tm = 99.1  emerald 50% diet, + boost  I/O = 680/550  ppx  Replace K  SSI  Off steroids    Physical Examination  General: Intermittently confused, moderate distress  Neuro/Psych: As above moves all extremities  HEENT: PERRL  CVS: SR/ST  Respiratory: Diminished throughout, HFNC  Abdomen/: Soft, NT  Extremities: 2+ edema  Skin: Warm, dry    Vitals:    12/19/20 0926   BP:    Pulse: (!) 123   Resp: (!) 23   Temp:    SpO2: 88%              Intake/Output Summary (Last 24 hours) at 12/16/2020 0932  Last data filed at 12/16/2020 0800  Gross per 24 hour   Intake 250 ml   Output 1740 ml   Net -1490 ml       Recent Labs     12/16/20  1000 12/17/20  0430 12/18/20  0245 12/19/20  0315   WBC  --  17.6* 22.5* 22.0*   ASTSGOT 37 35  --   --    ALTSGPT 38 36  --   --    ALKPHOSPHAT 166* 150*  --   --    TBILIRUBIN 0.6 0.5  --   --      Recent Labs     12/17/20  1170  12/18/20  0245 12/19/20 0315   SODIUM 137 135 137   POTASSIUM 3.9 4.0 3.7   CHLORIDE 98 97 95*   CO2 27 26 32   BUN 30* 36* 29*   CREATININE 0.80 0.79 0.84   CALCIUM 8.7 8.9 9.1     Recent Labs     12/16/20  1000 12/17/20  0430 12/18/20  0245 12/19/20 0315   ALTSGPT 38 36  --   --    ASTSGOT 37 35  --   --    ALKPHOSPHAT 166* 150*  --   --    TBILIRUBIN 0.6 0.5  --   --    GLUCOSE 290* 194* 199* 101*         US-EXTREMITY VENOUS LOWER BILAT   Final Result      DX-CHEST-PORTABLE (1 VIEW)   Final Result      No significant change in Covid pneumonia.      US-EXTREMITY VENOUS LOWER BILAT   Final Result      EC-ECHOCARDIOGRAM LTD W/O CONT   Final Result      DX-CHEST-LIMITED (1 VIEW)   Final Result      No significant change in bilateral pneumonia.      DX-CHEST-LIMITED (1 VIEW)   Final Result         Persistent bilateral pulmonary opacities consistent with Covid 19 pneumonia.      DX-CHEST-PORTABLE (1 VIEW)   Final Result      Bilateral peripheral pulmonary airspace process highly suspicious for Covid pneumonia          Active Hospital Problem List:  Principal Problem:    Pneumonia due to COVID-19 virus POA: Unknown  Active Problems:    Neuropathic pain POA: Yes    Acute hypoxemic respiratory failure (HCC) POA: Yes    Abnormal LFTs POA: Unknown    Leukocytosis POA: Unknown    Elevated d-dimer POA: Yes    Melena POA: Yes  Resolved Problems:    Lactic acidosis POA: Yes    Hyponatremia POA: Yes    Type 2 diabetes mellitus without complication (HCC) POA: Yes    Agitation POA: Yes      Updated Plan:  * Pneumonia due to COVID-19 virus  Assessment & Plan  COVID-19 pneumonia  Dexamethasone - completed  Remdesivir - completed  Fluid balance - keep euvolemic; lasix  Encourage self-proning  S/p course of abx  Improving today     Melena- (present on admission)  Assessment & Plan  Melena x1, H/H stable  Omeprazole  stable     Elevated d-dimer- (present on admission)  Assessment & Plan  D-Dimer > 20, on ppx lovenox  Lower extremity  doppler negative for VTE     Abnormal LFTs  Assessment & Plan  improving     Sepsis (HCC)- (present on admission)  Assessment & Plan  2/2 COVID, improved      Acute hypoxemic respiratory failure (HCC)- (present on admission)  Assessment & Plan  Secondary to COVID-19 ARDS  HOB > 30  Early mobility  Titrate supplemental FiO2 to maintain SpO2 >85%  Encourage self-proning  Aggressive pulmonary hygiene  HFNC  May require intubation; follow closely in ICU      Alexys Mayo M.D.  [facility]  The patient remains critically ill.  Critical care time = 32 minutes in directly providing and coordinating critical care and extensive data review.  No time overlap and excludes procedures.

## 2020-12-19 NOTE — CARE PLAN
Problem: Safety  Goal: Will remain free from injury  Outcome: PROGRESSING AS EXPECTED  Note: Continue POC  Call bell within reach  Side rails x 3, bed locked  Goal: Will remain free from falls  Outcome: PROGRESSING AS EXPECTED     Problem: Communication  Goal: The ability to communicate needs accurately and effectively will improve  Outcome: PROGRESSING SLOWER THAN EXPECTED  Note: Pt disinteresting in education or discussing plan of care even with       Problem: Knowledge Deficit  Goal: Knowledge of disease process/condition, treatment plan, diagnostic tests, and medications will improve  Outcome: PROGRESSING SLOWER THAN EXPECTED  Note: Language barrier  Disinterested in discussing plan or care using         Problem: Respiratory:  Goal: Respiratory status will improve  Outcome: PROGRESSING SLOWER THAN EXPECTED  Note: On HFNC 60L at 100% with intermittent use of 100% NRB  Encourage pulmonary hygiene  Deep breathing and coughing

## 2020-12-20 PROBLEM — F41.9 ANXIETY: Status: ACTIVE | Noted: 2020-12-20

## 2020-12-20 LAB
ANION GAP SERPL CALC-SCNC: 10 MMOL/L (ref 7–16)
BUN SERPL-MCNC: 23 MG/DL (ref 8–22)
CALCIUM SERPL-MCNC: 8.9 MG/DL (ref 8.5–10.5)
CHLORIDE SERPL-SCNC: 95 MMOL/L (ref 96–112)
CO2 SERPL-SCNC: 29 MMOL/L (ref 20–33)
CREAT SERPL-MCNC: 0.72 MG/DL (ref 0.5–1.4)
ERYTHROCYTE [DISTWIDTH] IN BLOOD BY AUTOMATED COUNT: 42.2 FL (ref 35.9–50)
GLUCOSE BLD-MCNC: 125 MG/DL (ref 65–99)
GLUCOSE BLD-MCNC: 176 MG/DL (ref 65–99)
GLUCOSE BLD-MCNC: 218 MG/DL (ref 65–99)
GLUCOSE SERPL-MCNC: 118 MG/DL (ref 65–99)
HCT VFR BLD AUTO: 46.2 % (ref 42–52)
HGB BLD-MCNC: 15.4 G/DL (ref 14–18)
MCH RBC QN AUTO: 30 PG (ref 27–33)
MCHC RBC AUTO-ENTMCNC: 33.3 G/DL (ref 33.7–35.3)
MCV RBC AUTO: 90.1 FL (ref 81.4–97.8)
PLATELET # BLD AUTO: 285 K/UL (ref 164–446)
PMV BLD AUTO: 9.6 FL (ref 9–12.9)
POTASSIUM SERPL-SCNC: 3.7 MMOL/L (ref 3.6–5.5)
RBC # BLD AUTO: 5.13 M/UL (ref 4.7–6.1)
SODIUM SERPL-SCNC: 134 MMOL/L (ref 135–145)
WBC # BLD AUTO: 21.8 K/UL (ref 4.8–10.8)

## 2020-12-20 PROCEDURE — 700102 HCHG RX REV CODE 250 W/ 637 OVERRIDE(OP): Performed by: INTERNAL MEDICINE

## 2020-12-20 PROCEDURE — A9270 NON-COVERED ITEM OR SERVICE: HCPCS | Performed by: HOSPITALIST

## 2020-12-20 PROCEDURE — 99232 SBSQ HOSP IP/OBS MODERATE 35: CPT | Performed by: HOSPITALIST

## 2020-12-20 PROCEDURE — 770022 HCHG ROOM/CARE - ICU (200)

## 2020-12-20 PROCEDURE — 80048 BASIC METABOLIC PNL TOTAL CA: CPT

## 2020-12-20 PROCEDURE — A9270 NON-COVERED ITEM OR SERVICE: HCPCS | Performed by: INTERNAL MEDICINE

## 2020-12-20 PROCEDURE — 99291 CRITICAL CARE FIRST HOUR: CPT | Performed by: INTERNAL MEDICINE

## 2020-12-20 PROCEDURE — 700102 HCHG RX REV CODE 250 W/ 637 OVERRIDE(OP): Performed by: HOSPITALIST

## 2020-12-20 PROCEDURE — 85027 COMPLETE CBC AUTOMATED: CPT

## 2020-12-20 PROCEDURE — 82962 GLUCOSE BLOOD TEST: CPT

## 2020-12-20 PROCEDURE — 700111 HCHG RX REV CODE 636 W/ 250 OVERRIDE (IP): Performed by: HOSPITALIST

## 2020-12-20 PROCEDURE — 94640 AIRWAY INHALATION TREATMENT: CPT

## 2020-12-20 PROCEDURE — 700111 HCHG RX REV CODE 636 W/ 250 OVERRIDE (IP): Performed by: EMERGENCY MEDICINE

## 2020-12-20 RX ORDER — POTASSIUM CHLORIDE 20 MEQ/1
40 TABLET, EXTENDED RELEASE ORAL ONCE
Status: COMPLETED | OUTPATIENT
Start: 2020-12-20 | End: 2020-12-20

## 2020-12-20 RX ORDER — ALPRAZOLAM 0.25 MG/1
0.25 TABLET ORAL 3 TIMES DAILY PRN
Status: DISCONTINUED | OUTPATIENT
Start: 2020-12-20 | End: 2021-01-07

## 2020-12-20 RX ADMIN — INSULIN HUMAN 3 UNITS: 100 INJECTION, SOLUTION PARENTERAL at 17:32

## 2020-12-20 RX ADMIN — OMEPRAZOLE 20 MG: 20 CAPSULE, DELAYED RELEASE ORAL at 06:03

## 2020-12-20 RX ADMIN — ENOXAPARIN SODIUM 40 MG: 40 INJECTION SUBCUTANEOUS at 06:03

## 2020-12-20 RX ADMIN — OXYCODONE HYDROCHLORIDE 5 MG: 5 TABLET ORAL at 14:26

## 2020-12-20 RX ADMIN — GABAPENTIN 400 MG: 400 CAPSULE ORAL at 17:30

## 2020-12-20 RX ADMIN — POTASSIUM CHLORIDE 40 MEQ: 1500 TABLET, EXTENDED RELEASE ORAL at 09:28

## 2020-12-20 RX ADMIN — FUROSEMIDE 20 MG: 10 INJECTION, SOLUTION INTRAMUSCULAR; INTRAVENOUS at 17:30

## 2020-12-20 RX ADMIN — ESCITALOPRAM OXALATE 10 MG: 10 TABLET ORAL at 06:03

## 2020-12-20 RX ADMIN — INSULIN HUMAN 3 UNITS: 100 INJECTION, SOLUTION PARENTERAL at 20:05

## 2020-12-20 RX ADMIN — OXYCODONE HYDROCHLORIDE 5 MG: 5 TABLET ORAL at 20:05

## 2020-12-20 RX ADMIN — GABAPENTIN 400 MG: 400 CAPSULE ORAL at 06:03

## 2020-12-20 RX ADMIN — ALPRAZOLAM 0.25 MG: 0.25 TABLET ORAL at 20:04

## 2020-12-20 RX ADMIN — FUROSEMIDE 20 MG: 10 INJECTION, SOLUTION INTRAMUSCULAR; INTRAVENOUS at 06:03

## 2020-12-20 RX ADMIN — INSULIN HUMAN 4 UNITS: 100 INJECTION, SOLUTION PARENTERAL at 11:58

## 2020-12-20 RX ADMIN — OXYCODONE HYDROCHLORIDE 5 MG: 5 TABLET ORAL at 04:46

## 2020-12-20 RX ADMIN — ALPRAZOLAM 0.25 MG: 0.25 TABLET ORAL at 09:55

## 2020-12-20 ASSESSMENT — ENCOUNTER SYMPTOMS
HEADACHES: 0
SHORTNESS OF BREATH: 1
PALPITATIONS: 0
FEVER: 0
DIZZINESS: 0
COUGH: 0
NERVOUS/ANXIOUS: 1
NAUSEA: 0
ABDOMINAL PAIN: 0
SORE THROAT: 0

## 2020-12-20 ASSESSMENT — PAIN DESCRIPTION - PAIN TYPE
TYPE: ACUTE PAIN

## 2020-12-20 ASSESSMENT — PAIN SCALES - WONG BAKER: WONGBAKER_NUMERICALRESPONSE: DOESN'T HURT AT ALL

## 2020-12-20 NOTE — DIETARY
Nutrition note:  Pt remains with poor po intake, most of which is d/t high amounts of oxygen (60 L and 100% via HFNC).   MD note says pt is weak.   Please consider Franca for nutrition support.  See dietary note from yesterday for details.  Pt has had reduced po intake since 12/11 and poor po intake x 6 days now.  Pt in need of nutrition.   RD following.

## 2020-12-20 NOTE — PROGRESS NOTES
Critical Care Progress Note  (COVID-19 Crisis Standards of Care Documentation)    Date of Service: 12/16/2020    Date of Admission:  12/8/2020  3:25 PM    CC/HPI: Sinhg Hall is a 55 y.o. male with PMH of type 2 diabetes mellitus and neuropathy presented 12/8 with dyspnea, malaise, N/V/D.  Admitted to telemetry floor on HFNC, treated with Decadron and remdesivir, transferred to ICU 12/11 for increasing dyspnea and oxygen requirement and melanotic stool.  Melena resolved stable ADILIA globin on PPI.      Hospital Course:  12/11 transfer to ICU for increasing O2 requirement; ,melanotic stool now on PPI  12/12 continues to require HFNC + NRB; no further melanotic stools; start diet; start enoxaparin  12/16 -worsened P/F on HFNC plus NRB,  12/17 -anxious but oriented x4, HFNC but off NRB, still with intermittent respiratory distress  12/18 - HFNC 70/100, int NRB, int agitated; lovenox ppx, lasix bid, off steroids  12/19 - HFNC60/100, off NRB now, improving SpO2, now off steroids   12/20 - HFNC, improved and now 70%, calm and cooperative now      Interval Problem Update:  HFNC 60/70, off NRB  SR/ST  Soft bm  Loveonx, prilosec  WBC 21.8  Lasix bid  No abx      Physical Examination  General: Intermittently confused, no distress today  Neuro/Psych: As above moves all extremities  HEENT: PERRL  CVS: SR/ST  Respiratory: Diminished throughout, HFNC  Abdomen/: Soft, NT  Extremities: 2+ edema  Skin: Warm, dry    Vitals:    12/20/20 1027   BP:    Pulse: (!) 108   Resp: (!) 32   Temp:    SpO2: 96%              Intake/Output Summary (Last 24 hours) at 12/16/2020 0932  Last data filed at 12/16/2020 0800  Gross per 24 hour   Intake 250 ml   Output 1740 ml   Net -1490 ml       Recent Labs     12/18/20  0245 12/19/20  0315 12/20/20  0430   WBC 22.5* 22.0* 21.8*     Recent Labs     12/18/20  0245 12/19/20  0315 12/20/20  0430   SODIUM 135 137 134*   POTASSIUM 4.0 3.7 3.7   CHLORIDE 97 95* 95*   CO2 26 32 29   BUN 36* 29* 23*    CREATININE 0.79 0.84 0.72   CALCIUM 8.9 9.1 8.9     Recent Labs     12/18/20  0245 12/19/20  0315 12/20/20  0430   GLUCOSE 199* 101* 118*         US-EXTREMITY VENOUS LOWER BILAT   Final Result      DX-CHEST-PORTABLE (1 VIEW)   Final Result      No significant change in Covid pneumonia.      US-EXTREMITY VENOUS LOWER BILAT   Final Result      EC-ECHOCARDIOGRAM LTD W/O CONT   Final Result      DX-CHEST-LIMITED (1 VIEW)   Final Result      No significant change in bilateral pneumonia.      DX-CHEST-LIMITED (1 VIEW)   Final Result         Persistent bilateral pulmonary opacities consistent with Covid 19 pneumonia.      DX-CHEST-PORTABLE (1 VIEW)   Final Result      Bilateral peripheral pulmonary airspace process highly suspicious for Covid pneumonia          Active Hospital Problem List:  Principal Problem:    Pneumonia due to COVID-19 virus POA: Unknown  Active Problems:    Acute hypoxemic respiratory failure (HCC) POA: Yes    Leukocytosis POA: Unknown    Abnormal LFTs POA: Unknown    Elevated d-dimer POA: Yes    Anxiety POA: Unknown    Neuropathic pain POA: Yes    Melena POA: Yes  Resolved Problems:    Lactic acidosis POA: Yes    Hyponatremia POA: Yes    Type 2 diabetes mellitus without complication (HCC) POA: Yes    Agitation POA: Yes      Updated Plan:  * Pneumonia due to COVID-19 virus  Assessment & Plan  COVID-19 pneumonia  Dexamethasone - completed  Remdesivir - completed  Fluid balance - keep euvolemic; lasix  Encourage self-proning  S/p course of abx  Improving today     Melena- (present on admission)  Assessment & Plan  Melena x1, H/H stable  Omeprazole  stable     Elevated d-dimer- (present on admission)  Assessment & Plan  D-Dimer > 20, on ppx lovenox  Lower extremity doppler negative for VTE     Abnormal LFTs  Assessment & Plan  improving     Sepsis (HCC)- (present on admission)  Assessment & Plan  2/2 COVID, improved      Acute hypoxemic respiratory failure (HCC)- (present on admission)  Assessment &  Plan  Secondary to COVID-19 ARDS  HOB > 30  Early mobility  Titrate supplemental FiO2 to maintain SpO2 >85%  Encourage self-proning  Aggressive pulmonary hygiene  HFNC  Weaning; continue to follow closely in ICU      Alexys Mayo M.D.  [facility]  The patient remains critically ill.  Critical care time = 31 minutes in directly providing and coordinating critical care and extensive data review.  No time overlap and excludes procedures.

## 2020-12-20 NOTE — CARE PLAN
Problem: Communication  Goal: The ability to communicate needs accurately and effectively will improve  Outcome: PROGRESSING AS EXPECTED  Note: Ipad  used to help discuss POC     Problem: Safety  Goal: Will remain free from injury  Outcome: PROGRESSING AS EXPECTED  Note: Continue POC  Call bell within reach  Side rails x 3, bed locked and in low position   Goal: Will remain free from falls  Outcome: PROGRESSING AS EXPECTED     Problem: Respiratory:  Goal: Respiratory status will improve  Outcome: PROGRESSING AS EXPECTED  Note: Tolerating current HFNC settings  Did not require addition of 100% NRB  Encourage pulmonary hygiene

## 2020-12-20 NOTE — PROGRESS NOTES
Hospital Medicine Daily Progress Note    Date of Service  12/20/2020    Chief Complaint  Short of breath with diarrhea, vomiting x 2 weeks.    Hospital Course  Kinyarwanda speaking 55 y.o. male with DMII, neuropathy, admitted 12/8/2020 with COVID-19 infection requiring ICU hospitalization due to O2 demand.    Interval Problem Update  12/15: On high flow NC (60L at 100%). On day 7 of 10 Decadron.  Tolerating diet.     12/16: Alert but ongoing respiratory distress.  Acute worsening this am with sinus tachycardia on telemetry and worsening oxygen saturation.  An ABG was requested and showed pH:7.46/pCO2:34.5/pO2:48.  I alerted Dr Mayo for possible need of bipap or intubation.    12/17: Still tenuous from a respiratory standpoint.  He is anxious at times and a bit scared.  Used  via iPad to communicate with him this morning.  He remains on 90% FiO2 with 60 L O2.    12/18: On 60L 100% and rapid shallow breathing.  Tearful at times.  Denied pain.  Able to talk in short sentences.  Discussed with Dr Mayo     12/19: Remains on high amounts of oxygen and very weak.  He states he is scared and nervous.  He remains on 60L and 100% via HFNC. I have called his sister and updated her on her brother's status.    12/20 Less respiratory distress.  Watching TV.  Some episodes on anxiety and RN states the antivan the other day helped patient.  Eating breakfast.    Consultants/Specialty  Pulmonary    Code Status  Full Code    Disposition  Transfer to COVID floor.    Review of Systems  Review of Systems   Constitutional: Negative for fever.   HENT: Negative for sore throat.    Respiratory: Positive for shortness of breath. Negative for cough.    Cardiovascular: Negative for chest pain, palpitations and leg swelling.   Gastrointestinal: Negative for abdominal pain and nausea.   Genitourinary: Negative for dysuria.   Neurological: Negative for dizziness and headaches.   Psychiatric/Behavioral: The patient is nervous/anxious.          Physical Exam  Temp:  [36.3 °C (97.3 °F)-37.3 °C (99.1 °F)] 36.7 °C (98 °F)  Pulse:  [] 114  Resp:  [16-35] 28  BP: ()/(60-83) 99/67  SpO2:  [90 %-99 %] 93 %    Physical Exam  Vitals signs reviewed.   Constitutional:       General: He is in acute distress.      Appearance: He is ill-appearing. He is not diaphoretic.   HENT:      Head: Normocephalic and atraumatic.      Nose: Nose normal.      Mouth/Throat:      Mouth: Mucous membranes are dry.      Pharynx: No oropharyngeal exudate.   Eyes:      General:         Right eye: No discharge.         Left eye: No discharge.      Extraocular Movements: Extraocular movements intact.      Conjunctiva/sclera:      Right eye: Hemorrhage present.      Left eye: Hemorrhage present.   Neck:      Musculoskeletal: Neck supple.   Cardiovascular:      Rate and Rhythm: Regular rhythm. Tachycardia present.      Pulses:           Radial pulses are 2+ on the right side and 2+ on the left side.        Dorsalis pedis pulses are 2+ on the right side and 2+ on the left side.      Heart sounds: No murmur.   Pulmonary:      Effort: Tachypnea and respiratory distress present.      Breath sounds: Rhonchi present. No wheezing.   Abdominal:      General: Bowel sounds are normal. There is no distension.      Palpations: Abdomen is soft.      Tenderness: There is no abdominal tenderness.   Musculoskeletal:         General: No swelling or tenderness.      Right lower leg: No edema.      Left lower leg: No edema.   Skin:     Coloration: Skin is not jaundiced.      Findings: No bruising.   Neurological:      General: No focal deficit present.      Mental Status: He is alert and oriented to person, place, and time. Mental status is at baseline.      Cranial Nerves: No cranial nerve deficit.   Psychiatric:         Mood and Affect: Mood normal.         Fluids    Intake/Output Summary (Last 24 hours) at 12/20/2020 1104  Last data filed at 12/20/2020 0900  Gross per 24 hour   Intake 860 ml    Output 1350 ml   Net -490 ml       Laboratory  Recent Labs     12/18/20  0245 12/19/20  0315 12/20/20  0430   WBC 22.5* 22.0* 21.8*   RBC 4.88 5.33 5.13   HEMOGLOBIN 14.7 15.8 15.4   HEMATOCRIT 45.0 48.5 46.2   MCV 92.2 91.0 90.1   MCH 30.1 29.6 30.0   MCHC 32.7* 32.6* 33.3*   RDW 43.8 43.1 42.2   PLATELETCT 310 330 285   MPV 9.3 9.5 9.6     Recent Labs     12/18/20  0245 12/19/20  0315 12/20/20  0430   SODIUM 135 137 134*   POTASSIUM 4.0 3.7 3.7   CHLORIDE 97 95* 95*   CO2 26 32 29   GLUCOSE 199* 101* 118*   BUN 36* 29* 23*   CREATININE 0.79 0.84 0.72   CALCIUM 8.9 9.1 8.9                   Imaging  US-EXTREMITY VENOUS LOWER BILAT   Final Result      DX-CHEST-PORTABLE (1 VIEW)   Final Result      No significant change in Covid pneumonia.      US-EXTREMITY VENOUS LOWER BILAT   Final Result      EC-ECHOCARDIOGRAM LTD W/O CONT   Final Result      DX-CHEST-LIMITED (1 VIEW)   Final Result      No significant change in bilateral pneumonia.      DX-CHEST-LIMITED (1 VIEW)   Final Result         Persistent bilateral pulmonary opacities consistent with Covid 19 pneumonia.      DX-CHEST-PORTABLE (1 VIEW)   Final Result      Bilateral peripheral pulmonary airspace process highly suspicious for Covid pneumonia           Assessment/Plan  * Pneumonia due to COVID-19 virus  Assessment & Plan  Tested COVID-19 positive on 12/8  Patient is on high flow,  S/p Decadron 6 mg p.o. daily until 12/17  S/p 5 days course of remdesivir with last dose 12/13.  continue incentive spirometer, provide Zn, vitamin D and ascorbic acid  Encourage proning and mobilization  Patient was treated with empiric IV antibiotics.  WBC and procalcitonin have been downtrending  Ultrasound negative for DVT in bilateral lower extremities, echocardiogram reveals normal LVEF    Leukocytosis  Assessment & Plan  12/20 WBC:21.8  12/19 WBC:22  12/17 WBC:17.6  monitor vitals.  Continue to monitor    Acute hypoxemic respiratory failure (HCC)- (present on  admission)  Assessment & Plan  Likely  secondary to Covid pneumonia superimposed to the patient with pneumonia  Status post course of 10 days Decadron  12/20 WBC:21.8  12/19 WBC:22  Encourage incentive spirometer, self proning, titrate oxygen as needed  COVID + as of 12/8    Anxiety  Assessment & Plan  Ordered Xanax 0.25mg TIDprn   On escitalopram 10mg daily    Elevated d-dimer- (present on admission)  Assessment & Plan  12/16 LE US negative for DVT  Echo from 12/11 reviewed with no right heart failure.    Abnormal LFTs  Assessment & Plan  Suspect due to covid 19   Monitoring CMP (last checked 12/13)  12/17 LFTs showing improvement.  Likely viral etiology    Sepsis (HCC)  Assessment & Plan  This is Severe Sepsis Present on admission  SIRS criteria identified on my evaluation include: Tachycardia, with heart rate greater than 90 BPM, Tachypnea, with respirations greater than 20 per minute and Leukocytosis, with WBC greater than 12,000  Source of infection is pulmonary  Clinical indicators of end organ dysfunction include Lactate >2 mmol/L (18.0 mg/dL)  Sepsis protocol initiated  Fluid resuscitation ordered per protocol  IV antibiotics as appropriate for source of sepsis  Reassessment: I have reassessed the patient's hemodynamic status  End organ dysfunction include(s):  Acute respiratory failure     Neuropathic pain- (present on admission)  Assessment & Plan  gabapentin       VTE prophylaxis: Enoxaparin 40mg daily

## 2020-12-20 NOTE — PROGRESS NOTES
Report receive from day RN. Orders reviewed. Pt remains in COVID isolation. Call bell within reach. See full assessment

## 2020-12-21 LAB
ANION GAP SERPL CALC-SCNC: 9 MMOL/L (ref 7–16)
BUN SERPL-MCNC: 28 MG/DL (ref 8–22)
CALCIUM SERPL-MCNC: 8.6 MG/DL (ref 8.5–10.5)
CHLORIDE SERPL-SCNC: 93 MMOL/L (ref 96–112)
CO2 SERPL-SCNC: 29 MMOL/L (ref 20–33)
CREAT SERPL-MCNC: 0.82 MG/DL (ref 0.5–1.4)
ERYTHROCYTE [DISTWIDTH] IN BLOOD BY AUTOMATED COUNT: 42.3 FL (ref 35.9–50)
GLUCOSE BLD-MCNC: 134 MG/DL (ref 65–99)
GLUCOSE BLD-MCNC: 145 MG/DL (ref 65–99)
GLUCOSE BLD-MCNC: 198 MG/DL (ref 65–99)
GLUCOSE BLD-MCNC: 229 MG/DL (ref 65–99)
GLUCOSE BLD-MCNC: 258 MG/DL (ref 65–99)
GLUCOSE SERPL-MCNC: 153 MG/DL (ref 65–99)
HCT VFR BLD AUTO: 43.9 % (ref 42–52)
HGB BLD-MCNC: 14.7 G/DL (ref 14–18)
MCH RBC QN AUTO: 30.5 PG (ref 27–33)
MCHC RBC AUTO-ENTMCNC: 33.5 G/DL (ref 33.7–35.3)
MCV RBC AUTO: 91.1 FL (ref 81.4–97.8)
PLATELET # BLD AUTO: 250 K/UL (ref 164–446)
PMV BLD AUTO: 9.7 FL (ref 9–12.9)
POTASSIUM SERPL-SCNC: 4 MMOL/L (ref 3.6–5.5)
RBC # BLD AUTO: 4.82 M/UL (ref 4.7–6.1)
SODIUM SERPL-SCNC: 131 MMOL/L (ref 135–145)
WBC # BLD AUTO: 18.1 K/UL (ref 4.8–10.8)

## 2020-12-21 PROCEDURE — 99233 SBSQ HOSP IP/OBS HIGH 50: CPT | Performed by: INTERNAL MEDICINE

## 2020-12-21 PROCEDURE — 700111 HCHG RX REV CODE 636 W/ 250 OVERRIDE (IP): Performed by: EMERGENCY MEDICINE

## 2020-12-21 PROCEDURE — A9270 NON-COVERED ITEM OR SERVICE: HCPCS | Performed by: INTERNAL MEDICINE

## 2020-12-21 PROCEDURE — 700102 HCHG RX REV CODE 250 W/ 637 OVERRIDE(OP): Performed by: HOSPITALIST

## 2020-12-21 PROCEDURE — 99232 SBSQ HOSP IP/OBS MODERATE 35: CPT | Performed by: HOSPITALIST

## 2020-12-21 PROCEDURE — 700111 HCHG RX REV CODE 636 W/ 250 OVERRIDE (IP): Performed by: HOSPITALIST

## 2020-12-21 PROCEDURE — A9270 NON-COVERED ITEM OR SERVICE: HCPCS | Performed by: HOSPITALIST

## 2020-12-21 PROCEDURE — 82962 GLUCOSE BLOOD TEST: CPT | Mod: 91

## 2020-12-21 PROCEDURE — 94640 AIRWAY INHALATION TREATMENT: CPT

## 2020-12-21 PROCEDURE — 700102 HCHG RX REV CODE 250 W/ 637 OVERRIDE(OP): Performed by: INTERNAL MEDICINE

## 2020-12-21 PROCEDURE — 80048 BASIC METABOLIC PNL TOTAL CA: CPT

## 2020-12-21 PROCEDURE — 770020 HCHG ROOM/CARE - TELE (206)

## 2020-12-21 PROCEDURE — 85027 COMPLETE CBC AUTOMATED: CPT

## 2020-12-21 RX ADMIN — INSULIN HUMAN 7 UNITS: 100 INJECTION, SOLUTION PARENTERAL at 22:13

## 2020-12-21 RX ADMIN — OMEPRAZOLE 20 MG: 20 CAPSULE, DELAYED RELEASE ORAL at 05:29

## 2020-12-21 RX ADMIN — GABAPENTIN 400 MG: 400 CAPSULE ORAL at 16:36

## 2020-12-21 RX ADMIN — OXYCODONE HYDROCHLORIDE 5 MG: 5 TABLET ORAL at 12:19

## 2020-12-21 RX ADMIN — ACETAMINOPHEN 650 MG: 325 TABLET, FILM COATED ORAL at 15:12

## 2020-12-21 RX ADMIN — OXYCODONE HYDROCHLORIDE 5 MG: 5 TABLET ORAL at 05:29

## 2020-12-21 RX ADMIN — GABAPENTIN 400 MG: 400 CAPSULE ORAL at 05:30

## 2020-12-21 RX ADMIN — FUROSEMIDE 20 MG: 10 INJECTION, SOLUTION INTRAMUSCULAR; INTRAVENOUS at 15:27

## 2020-12-21 RX ADMIN — FUROSEMIDE 20 MG: 10 INJECTION, SOLUTION INTRAMUSCULAR; INTRAVENOUS at 05:30

## 2020-12-21 RX ADMIN — ENOXAPARIN SODIUM 40 MG: 40 INJECTION SUBCUTANEOUS at 05:30

## 2020-12-21 RX ADMIN — ALPRAZOLAM 0.25 MG: 0.25 TABLET ORAL at 12:19

## 2020-12-21 RX ADMIN — ALPRAZOLAM 0.25 MG: 0.25 TABLET ORAL at 07:46

## 2020-12-21 RX ADMIN — INSULIN HUMAN 4 UNITS: 100 INJECTION, SOLUTION PARENTERAL at 17:00

## 2020-12-21 RX ADMIN — ESCITALOPRAM OXALATE 10 MG: 10 TABLET ORAL at 05:30

## 2020-12-21 ASSESSMENT — ENCOUNTER SYMPTOMS
BACK PAIN: 0
NERVOUS/ANXIOUS: 1
SHORTNESS OF BREATH: 1
DIZZINESS: 0
ABDOMINAL PAIN: 0
FLANK PAIN: 0
PALPITATIONS: 0
SORE THROAT: 0
NERVOUS/ANXIOUS: 0
COUGH: 0
SHORTNESS OF BREATH: 0
VOMITING: 0
HEADACHES: 0
SPUTUM PRODUCTION: 0
FEVER: 0
NAUSEA: 0
COUGH: 1

## 2020-12-21 ASSESSMENT — PAIN DESCRIPTION - PAIN TYPE: TYPE: ACUTE PAIN

## 2020-12-21 NOTE — PROGRESS NOTES
Report received from Day RN. Orders reviewed. Pt remains in COVID isolation. Call bell within reach. See full assessment

## 2020-12-21 NOTE — PROGRESS NOTES
Critical Care Progress Note  (COVID-19 Crisis Standards of Care Documentation)    Date of Admission:  12/8/2020  3:25 PM    CC/HPI: Singh Hall is a 55 y.o. male with PMH of type 2 diabetes mellitus and neuropathy presented 12/8 with dyspnea, malaise, N/V/D.  Admitted to telemetry floor on HFNC, treated with Decadron and remdesivir, transferred to ICU 12/11 for increasing dyspnea and oxygen requirement and melanotic stool.  Melena resolved stable ADILIA globin on PPI.      Hospital Course:  12/11 transfer to ICU for increasing O2 requirement; ,melanotic stool now on PPI  12/12 continues to require HFNC + NRB; no further melanotic stools; start diet; start enoxaparin  12/16 -worsened P/F on HFNC plus NRB,  12/17 -anxious but oriented x4, HFNC but off NRB, still with intermittent respiratory distress  12/18 - HFNC 70/100, int NRB, int agitated; lovenox ppx, lasix bid, off steroids  12/19 - HFNC60/100, off NRB now, improving SpO2, now off steroids   12/20 - HFNC, improved and now 70%, calm and cooperative now  12/21 - HFNC 50/50, WOB low, LOC good      Interval Problem Update:    HFNC 50/50 - sat 94%  WOB low, RR teens  Follows, speaking in full sentences mostly French  Tm 37.2  WBC 18.1, better  SR/ST  MAP > 65  UO adequate  Afebrile  PPI  S/p decadron 12/18  Lasix 20 BID 9   sodium mildly depressed at 131      Tele transfer     YESTERDAY  HFNC 60/70, off NRB  SR/ST  Soft bm  Loveonx, prilosec  WBC 21.8  Lasix bid  No abx    Review of Systems   Constitutional: Positive for malaise/fatigue.   HENT: Negative for sore throat.    Respiratory: Positive for cough. Negative for sputum production and shortness of breath.    Cardiovascular: Positive for chest pain (ache w/breathing).   Gastrointestinal: Negative for abdominal pain, nausea and vomiting.   Genitourinary: Negative for dysuria and flank pain.   Musculoskeletal: Negative for back pain.   Neurological: Negative for headaches.   Psychiatric/Behavioral: The patient is  not nervous/anxious.      Physical Examination  General: Intermittently confused but appropriate and oriented for me, NAD  Neuro/Psych: Brainstem reflexes intact, nonfocal neuro exam, clinically appropriate dissipating well per RN  HEENT: PERRL, mouth a bit dry  CVS: SR/ST, no murmur  Respiratory: Diminished throughout, HFNC, WOB low, RR teens a few basilar crackles  Abdomen/: Soft, NT positive bowel sounds  Extremities: trace edema, no cyanosis  Skin: Warm, dry    Vitals:    12/21/20 1200   BP: 103/75   Pulse: (!) 114   Resp: (!) 22   Temp:    SpO2: 91%          Intake/Output Summary (Last 24 hours) at 12/16/2020 0932  Last data filed at 12/16/2020 0800  Gross per 24 hour   Intake 250 ml   Output 1740 ml   Net -1490 ml       Recent Labs     12/19/20  0315 12/20/20  0430 12/21/20  0120   WBC 22.0* 21.8* 18.1*     Recent Labs     12/19/20  0315 12/20/20  0430 12/21/20  0120   SODIUM 137 134* 131*   POTASSIUM 3.7 3.7 4.0   CHLORIDE 95* 95* 93*   CO2 32 29 29   BUN 29* 23* 28*   CREATININE 0.84 0.72 0.82   CALCIUM 9.1 8.9 8.6     Recent Labs     12/19/20  0315 12/20/20  0430 12/21/20  0120   GLUCOSE 101* 118* 153*         US-EXTREMITY VENOUS LOWER BILAT   Final Result      DX-CHEST-PORTABLE (1 VIEW)   Final Result      No significant change in Covid pneumonia.      US-EXTREMITY VENOUS LOWER BILAT   Final Result      EC-ECHOCARDIOGRAM LTD W/O CONT   Final Result      DX-CHEST-LIMITED (1 VIEW)   Final Result      No significant change in bilateral pneumonia.      DX-CHEST-LIMITED (1 VIEW)   Final Result         Persistent bilateral pulmonary opacities consistent with Covid 19 pneumonia.      DX-CHEST-PORTABLE (1 VIEW)   Final Result      Bilateral peripheral pulmonary airspace process highly suspicious for Covid pneumonia          Active Hospital Problem List:  Principal Problem:    Acute hypoxemic respiratory failure (HCC) POA: Yes  Active Problems:    Pneumonia due to COVID-19 virus POA: Unknown    Leukocytosis POA:  Unknown    Abnormal LFTs POA: Unknown    Elevated d-dimer POA: Yes    Melena POA: Yes    Anxiety POA: Unknown    Neuropathic pain POA: Yes  Resolved Problems:    Lactic acidosis POA: Yes    Hyponatremia POA: Yes    Type 2 diabetes mellitus without complication (HCC) POA: Yes    Agitation POA: Yes      Updated Plan:  * Pneumonia due to COVID-19 virus  Assessment & Plan  COVID-19 pneumonia  Dexamethasone - completed  Remdesivir - completed  Fluid balance - keep euvolemic; lasix  Encourage self-proning  S/p course of abx  Oxygenation improving, appears ready to transfer to telemetry     Melena- (present on admission)  Assessment & Plan  Melena x1, no recurrence, H/H stable  Continue omeprazole for now  Monitor     Elevated d-dimer- (present on admission)  Assessment & Plan  D-Dimer > 20, on ppx lovenox  Lower extremity doppler negative for VTE     Abnormal LFTs  Assessment & Plan  improving, transaminases normal, alk phos still mildly elevated but improved     Sepsis (HCC)- (present on admission)  Assessment & Plan  2/2 COVID, improved/resolved     Acute hypoxemic respiratory failure (HCC)- (present on admission)  Assessment & Plan  Secondary to COVID-19 ARDS  HOB > 30  Early mobility  Titrate supplemental FiO2 to maintain SpO2 >85%  Continue to encourage self-proning  Aggressive pulmonary hygiene  HFNC, wean FiO2 as tolerated  Safe for transfer out of ICU to telemetry    Case reviewed with RN, RT, charge RN, clinical pharmacist, patient and hospitalist.  All in agreement, patient appears ready for transfer to telemetry.  RCC will sign off to hospitalist team, please contact us if we can be of any assistance.

## 2020-12-21 NOTE — CARE PLAN
Problem: Nutritional:  Goal: Achieve adequate nutritional intake  Description: Patient will consume 50% of meals + supplements  Outcome: PROGRESSING AS EXPECTED   Improvement in PO intake noted - since 12/19: 25-50% w/ 50-75% supp intake, <25% w/ 25-50% supp intake, 50-75% w/ 25-50% supp intake, 25-50% w/ % supp intake, 25-50% w/ % supp intake, and 50-75%.  Per discussion w/ RN today, pt consuming %.  Looking better per MD progress note, but still weak.  +HHFNC.  Will continue to monitor closely for continuation of sufficient intake w/ hopeful wt & strength increase.

## 2020-12-21 NOTE — CARE PLAN
Problem: Respiratory:  Goal: Respiratory status will improve  Outcome: PROGRESSING AS EXPECTED  Note: Continue Plan of care  Encourage pulmonary hygiene  Tolerating current HFNC settings       Problem: Psychosocial Needs:  Goal: Level of anxiety will decrease  Outcome: PROGRESSING AS EXPECTED  Note: Pt less anxious with Xanax  Use  for education. Reinforce pt improving and encourage pt to express feelings and needs

## 2020-12-21 NOTE — PROGRESS NOTES
Hospital Medicine Daily Progress Note    Date of Service  12/21/2020    Chief Complaint  Short of breath with diarrhea, vomiting x 2 weeks.    Hospital Course  Urdu speaking 55 y.o. male with DMII, neuropathy, admitted 12/8/2020 with COVID-19 infection requiring ICU hospitalization due to O2 demand.    Interval Problem Update  12/15: On high flow NC (60L at 100%). On day 7 of 10 Decadron.  Tolerating diet.     12/16: Alert but ongoing respiratory distress.  Acute worsening this am with sinus tachycardia on telemetry and worsening oxygen saturation.  An ABG was requested and showed pH:7.46/pCO2:34.5/pO2:48.  I alerted Dr Mayo for possible need of bipap or intubation.    12/17: Still tenuous from a respiratory standpoint.  He is anxious at times and a bit scared.  Used  via iPad to communicate with him this morning.  He remains on 90% FiO2 with 60 L O2.    12/18: On 60L 100% and rapid shallow breathing.  Tearful at times.  Denied pain.  Able to talk in short sentences.  Discussed with Dr Mayo     12/19: Remains on high amounts of oxygen and very weak.  He states he is scared and nervous.  He remains on 60L and 100% via HFNC. I have called his sister and updated her on her brother's status.    12/20 Less respiratory distress.  Watching TV.  Some episodes on anxiety and RN states the antivan the other day helped patient.  Eating breakfast.    12/21 Looks better today but still very weak.  Oxygen now down to 50L and 50% via HFNC.  Awake and eating breakfast.  Anxious at times.  Watches TV.    Consultants/Specialty  Pulmonary    Code Status  Full Code    Disposition  Transfer to COVID floor.    Review of Systems  Review of Systems   Constitutional: Positive for malaise/fatigue. Negative for fever.   HENT: Negative for sore throat.    Respiratory: Positive for shortness of breath. Negative for cough.    Cardiovascular: Negative for chest pain, palpitations and leg swelling.   Gastrointestinal: Negative for  abdominal pain and nausea.   Genitourinary: Negative for dysuria.   Neurological: Negative for dizziness and headaches.   Psychiatric/Behavioral: The patient is nervous/anxious.         Physical Exam  Temp:  [36.2 °C (97.1 °F)-37.2 °C (98.9 °F)] 37 °C (98.6 °F)  Pulse:  [] 115  Resp:  [9-50] 16  BP: ()/(58-82) 93/67  SpO2:  [89 %-96 %] 93 %    Physical Exam  Vitals signs reviewed.   Constitutional:       General: He is in acute distress.      Appearance: He is ill-appearing. He is not diaphoretic.   HENT:      Head: Normocephalic and atraumatic.      Nose: Nose normal.      Mouth/Throat:      Mouth: Mucous membranes are dry.      Pharynx: No oropharyngeal exudate.   Eyes:      General:         Right eye: No discharge.         Left eye: No discharge.      Extraocular Movements: Extraocular movements intact.      Conjunctiva/sclera:      Right eye: Hemorrhage present.      Left eye: Hemorrhage present.   Neck:      Musculoskeletal: Neck supple.   Cardiovascular:      Rate and Rhythm: Regular rhythm. Tachycardia present.      Pulses:           Radial pulses are 2+ on the right side and 2+ on the left side.        Dorsalis pedis pulses are 2+ on the right side and 2+ on the left side.      Heart sounds: No murmur.   Pulmonary:      Effort: Tachypnea and respiratory distress present.      Breath sounds: Rhonchi present. No wheezing.   Abdominal:      General: Bowel sounds are normal. There is no distension.      Palpations: Abdomen is soft.      Tenderness: There is no abdominal tenderness.   Musculoskeletal:         General: No swelling or tenderness.      Right lower leg: No edema.      Left lower leg: No edema.   Skin:     Coloration: Skin is not jaundiced.      Findings: No bruising.   Neurological:      General: No focal deficit present.      Mental Status: He is alert and oriented to person, place, and time. Mental status is at baseline.      Cranial Nerves: No cranial nerve deficit.   Psychiatric:          Mood and Affect: Mood normal.         Fluids    Intake/Output Summary (Last 24 hours) at 12/21/2020 1108  Last data filed at 12/21/2020 0600  Gross per 24 hour   Intake 1080 ml   Output 1150 ml   Net -70 ml       Laboratory  Recent Labs     12/19/20  0315 12/20/20  0430 12/21/20  0120   WBC 22.0* 21.8* 18.1*   RBC 5.33 5.13 4.82   HEMOGLOBIN 15.8 15.4 14.7   HEMATOCRIT 48.5 46.2 43.9   MCV 91.0 90.1 91.1   MCH 29.6 30.0 30.5   MCHC 32.6* 33.3* 33.5*   RDW 43.1 42.2 42.3   PLATELETCT 330 285 250   MPV 9.5 9.6 9.7     Recent Labs     12/19/20 0315 12/20/20  0430 12/21/20  0120   SODIUM 137 134* 131*   POTASSIUM 3.7 3.7 4.0   CHLORIDE 95* 95* 93*   CO2 32 29 29   GLUCOSE 101* 118* 153*   BUN 29* 23* 28*   CREATININE 0.84 0.72 0.82   CALCIUM 9.1 8.9 8.6                   Imaging  US-EXTREMITY VENOUS LOWER BILAT   Final Result      DX-CHEST-PORTABLE (1 VIEW)   Final Result      No significant change in Covid pneumonia.      US-EXTREMITY VENOUS LOWER BILAT   Final Result      EC-ECHOCARDIOGRAM LTD W/O CONT   Final Result      DX-CHEST-LIMITED (1 VIEW)   Final Result      No significant change in bilateral pneumonia.      DX-CHEST-LIMITED (1 VIEW)   Final Result         Persistent bilateral pulmonary opacities consistent with Covid 19 pneumonia.      DX-CHEST-PORTABLE (1 VIEW)   Final Result      Bilateral peripheral pulmonary airspace process highly suspicious for Covid pneumonia           Assessment/Plan  * Pneumonia due to COVID-19 virus  Assessment & Plan  Tested COVID-19 positive on 12/8  Patient is on high flow,  S/p Decadron 6 mg p.o. daily until 12/17  S/p 5 days course of remdesivir with last dose 12/13.  continue incentive spirometer, provide Zn, vitamin D and ascorbic acid  Encourage proning and mobilization  Patient was treated with empiric IV antibiotics.  WBC and procalcitonin have been downtrending  Ultrasound negative for DVT in bilateral lower extremities, echocardiogram reveals normal  LVEF    Leukocytosis  Assessment & Plan  12/21 WBC: 18.1  12/20 WBC:21.8  12/19 WBC:22  12/17 WBC:17.6  monitor vitals.  Continue to monitor    Acute hypoxemic respiratory failure (HCC)- (present on admission)  Assessment & Plan  Likely  secondary to Covid pneumonia superimposed to the patient with pneumonia  Status post course of 10 days Decadron  Encourage incentive spirometer, self proning, titrate oxygen as needed  COVID + as of 12/8    Anxiety  Assessment & Plan  Ordered Xanax 0.25mg TIDprn   On escitalopram 10mg daily    Elevated d-dimer- (present on admission)  Assessment & Plan  12/16 LE US negative for DVT  Echo from 12/11 reviewed with no right heart failure.    Abnormal LFTs  Assessment & Plan  Suspect due to covid 19   Monitoring CMP (last checked 12/13)  12/17 LFTs showing improvement.  Likely viral etiology    Sepsis (HCC)  Assessment & Plan  This is Severe Sepsis Present on admission  SIRS criteria identified on my evaluation include: Tachycardia, with heart rate greater than 90 BPM, Tachypnea, with respirations greater than 20 per minute and Leukocytosis, with WBC greater than 12,000  Source of infection is pulmonary  Clinical indicators of end organ dysfunction include Lactate >2 mmol/L (18.0 mg/dL)  Sepsis protocol initiated  Fluid resuscitation ordered per protocol  IV antibiotics as appropriate for source of sepsis  Reassessment: I have reassessed the patient's hemodynamic status  End organ dysfunction include(s):  Acute respiratory failure     Neuropathic pain- (present on admission)  Assessment & Plan  gabapentin       VTE prophylaxis: Enoxaparin 40mg daily

## 2020-12-22 PROBLEM — E11.65 TYPE 2 DIABETES MELLITUS WITH HYPERGLYCEMIA, WITHOUT LONG-TERM CURRENT USE OF INSULIN (HCC): Status: ACTIVE | Noted: 2020-12-09

## 2020-12-22 LAB
ANION GAP SERPL CALC-SCNC: 8 MMOL/L (ref 7–16)
BUN SERPL-MCNC: 20 MG/DL (ref 8–22)
CALCIUM SERPL-MCNC: 8.7 MG/DL (ref 8.5–10.5)
CHLORIDE SERPL-SCNC: 89 MMOL/L (ref 96–112)
CO2 SERPL-SCNC: 32 MMOL/L (ref 20–33)
CREAT SERPL-MCNC: 0.86 MG/DL (ref 0.5–1.4)
EKG IMPRESSION: NORMAL
ERYTHROCYTE [DISTWIDTH] IN BLOOD BY AUTOMATED COUNT: 43.5 FL (ref 35.9–50)
GLUCOSE BLD-MCNC: 100 MG/DL (ref 65–99)
GLUCOSE BLD-MCNC: 150 MG/DL (ref 65–99)
GLUCOSE BLD-MCNC: 213 MG/DL (ref 65–99)
GLUCOSE SERPL-MCNC: 178 MG/DL (ref 65–99)
HCT VFR BLD AUTO: 43.1 % (ref 42–52)
HGB BLD-MCNC: 14.5 G/DL (ref 14–18)
MCH RBC QN AUTO: 30.9 PG (ref 27–33)
MCHC RBC AUTO-ENTMCNC: 33.6 G/DL (ref 33.7–35.3)
MCV RBC AUTO: 91.9 FL (ref 81.4–97.8)
PLATELET # BLD AUTO: 256 K/UL (ref 164–446)
PMV BLD AUTO: 9.5 FL (ref 9–12.9)
POTASSIUM SERPL-SCNC: 3.9 MMOL/L (ref 3.6–5.5)
RBC # BLD AUTO: 4.69 M/UL (ref 4.7–6.1)
SODIUM SERPL-SCNC: 129 MMOL/L (ref 135–145)
WBC # BLD AUTO: 17.4 K/UL (ref 4.8–10.8)

## 2020-12-22 PROCEDURE — 700111 HCHG RX REV CODE 636 W/ 250 OVERRIDE (IP): Performed by: HOSPITALIST

## 2020-12-22 PROCEDURE — A9270 NON-COVERED ITEM OR SERVICE: HCPCS | Performed by: HOSPITALIST

## 2020-12-22 PROCEDURE — 93010 ELECTROCARDIOGRAM REPORT: CPT | Performed by: INTERNAL MEDICINE

## 2020-12-22 PROCEDURE — 700102 HCHG RX REV CODE 250 W/ 637 OVERRIDE(OP): Performed by: HOSPITALIST

## 2020-12-22 PROCEDURE — 93005 ELECTROCARDIOGRAM TRACING: CPT | Performed by: HOSPITALIST

## 2020-12-22 PROCEDURE — 700111 HCHG RX REV CODE 636 W/ 250 OVERRIDE (IP): Performed by: EMERGENCY MEDICINE

## 2020-12-22 PROCEDURE — 80048 BASIC METABOLIC PNL TOTAL CA: CPT

## 2020-12-22 PROCEDURE — 94640 AIRWAY INHALATION TREATMENT: CPT

## 2020-12-22 PROCEDURE — 82962 GLUCOSE BLOOD TEST: CPT | Mod: 91

## 2020-12-22 PROCEDURE — A9270 NON-COVERED ITEM OR SERVICE: HCPCS | Performed by: INTERNAL MEDICINE

## 2020-12-22 PROCEDURE — 700102 HCHG RX REV CODE 250 W/ 637 OVERRIDE(OP): Performed by: INTERNAL MEDICINE

## 2020-12-22 PROCEDURE — 770020 HCHG ROOM/CARE - TELE (206)

## 2020-12-22 PROCEDURE — 99232 SBSQ HOSP IP/OBS MODERATE 35: CPT | Performed by: HOSPITALIST

## 2020-12-22 PROCEDURE — 85027 COMPLETE CBC AUTOMATED: CPT

## 2020-12-22 RX ORDER — POTASSIUM CHLORIDE 20 MEQ/1
40 TABLET, EXTENDED RELEASE ORAL ONCE
Status: COMPLETED | OUTPATIENT
Start: 2020-12-22 | End: 2020-12-22

## 2020-12-22 RX ORDER — INSULIN GLARGINE 100 [IU]/ML
0.2 INJECTION, SOLUTION SUBCUTANEOUS EVERY EVENING
Status: DISCONTINUED | OUTPATIENT
Start: 2020-12-22 | End: 2021-01-09 | Stop reason: HOSPADM

## 2020-12-22 RX ORDER — GUAIFENESIN/DEXTROMETHORPHAN 100-10MG/5
5 SYRUP ORAL EVERY 6 HOURS PRN
Status: DISCONTINUED | OUTPATIENT
Start: 2020-12-22 | End: 2020-12-26

## 2020-12-22 RX ADMIN — GABAPENTIN 400 MG: 400 CAPSULE ORAL at 17:53

## 2020-12-22 RX ADMIN — GABAPENTIN 400 MG: 400 CAPSULE ORAL at 05:17

## 2020-12-22 RX ADMIN — OXYCODONE HYDROCHLORIDE 5 MG: 5 TABLET ORAL at 09:27

## 2020-12-22 RX ADMIN — OXYCODONE HYDROCHLORIDE 5 MG: 5 TABLET ORAL at 02:36

## 2020-12-22 RX ADMIN — INSULIN HUMAN 4 UNITS: 100 INJECTION, SOLUTION PARENTERAL at 12:55

## 2020-12-22 RX ADMIN — INSULIN GLARGINE 11 UNITS: 100 INJECTION, SOLUTION SUBCUTANEOUS at 17:59

## 2020-12-22 RX ADMIN — INSULIN HUMAN 3 UNITS: 100 INJECTION, SOLUTION PARENTERAL at 20:13

## 2020-12-22 RX ADMIN — OMEPRAZOLE 20 MG: 20 CAPSULE, DELAYED RELEASE ORAL at 05:17

## 2020-12-22 RX ADMIN — FUROSEMIDE 20 MG: 10 INJECTION, SOLUTION INTRAMUSCULAR; INTRAVENOUS at 05:18

## 2020-12-22 RX ADMIN — ACETAMINOPHEN 650 MG: 325 TABLET, FILM COATED ORAL at 20:02

## 2020-12-22 RX ADMIN — ENOXAPARIN SODIUM 40 MG: 40 INJECTION SUBCUTANEOUS at 05:17

## 2020-12-22 RX ADMIN — ALPRAZOLAM 0.25 MG: 0.25 TABLET ORAL at 12:55

## 2020-12-22 RX ADMIN — OXYCODONE HYDROCHLORIDE 5 MG: 5 TABLET ORAL at 15:50

## 2020-12-22 RX ADMIN — POTASSIUM CHLORIDE 40 MEQ: 1500 TABLET, EXTENDED RELEASE ORAL at 12:55

## 2020-12-22 RX ADMIN — OXYCODONE HYDROCHLORIDE 5 MG: 5 TABLET ORAL at 20:02

## 2020-12-22 RX ADMIN — ESCITALOPRAM OXALATE 10 MG: 10 TABLET ORAL at 05:17

## 2020-12-22 ASSESSMENT — PAIN DESCRIPTION - PAIN TYPE
TYPE: ACUTE PAIN

## 2020-12-22 ASSESSMENT — ENCOUNTER SYMPTOMS
DIZZINESS: 0
SORE THROAT: 0
NAUSEA: 0
PALPITATIONS: 0
HEADACHES: 0
FEVER: 0
SHORTNESS OF BREATH: 1
COUGH: 0
NERVOUS/ANXIOUS: 1
ABDOMINAL PAIN: 0

## 2020-12-23 ENCOUNTER — APPOINTMENT (OUTPATIENT)
Dept: RADIOLOGY | Facility: MEDICAL CENTER | Age: 55
DRG: 871 | End: 2020-12-23
Attending: HOSPITALIST
Payer: COMMERCIAL

## 2020-12-23 PROBLEM — I30.9 ACUTE PERICARDITIS: Status: ACTIVE | Noted: 2020-12-23

## 2020-12-23 LAB
ANION GAP SERPL CALC-SCNC: 6 MMOL/L (ref 7–16)
BUN SERPL-MCNC: 15 MG/DL (ref 8–22)
CALCIUM SERPL-MCNC: 9.1 MG/DL (ref 8.5–10.5)
CHLORIDE SERPL-SCNC: 89 MMOL/L (ref 96–112)
CO2 SERPL-SCNC: 32 MMOL/L (ref 20–33)
CREAT SERPL-MCNC: 0.77 MG/DL (ref 0.5–1.4)
EKG IMPRESSION: NORMAL
ERYTHROCYTE [DISTWIDTH] IN BLOOD BY AUTOMATED COUNT: 43.8 FL (ref 35.9–50)
GLUCOSE BLD-MCNC: 110 MG/DL (ref 65–99)
GLUCOSE BLD-MCNC: 175 MG/DL (ref 65–99)
GLUCOSE BLD-MCNC: 176 MG/DL (ref 65–99)
GLUCOSE BLD-MCNC: 187 MG/DL (ref 65–99)
GLUCOSE BLD-MCNC: 199 MG/DL (ref 65–99)
GLUCOSE SERPL-MCNC: 123 MG/DL (ref 65–99)
HCT VFR BLD AUTO: 42.6 % (ref 42–52)
HGB BLD-MCNC: 13.8 G/DL (ref 14–18)
LACTATE BLD-SCNC: 1.2 MMOL/L (ref 0.5–2)
LACTATE BLD-SCNC: 1.8 MMOL/L (ref 0.5–2)
MCH RBC QN AUTO: 30.1 PG (ref 27–33)
MCHC RBC AUTO-ENTMCNC: 32.4 G/DL (ref 33.7–35.3)
MCV RBC AUTO: 92.8 FL (ref 81.4–97.8)
PLATELET # BLD AUTO: 272 K/UL (ref 164–446)
PMV BLD AUTO: 9.7 FL (ref 9–12.9)
POTASSIUM SERPL-SCNC: 4.4 MMOL/L (ref 3.6–5.5)
PROCALCITONIN SERPL-MCNC: 0.62 NG/ML
RBC # BLD AUTO: 4.59 M/UL (ref 4.7–6.1)
SODIUM SERPL-SCNC: 127 MMOL/L (ref 135–145)
WBC # BLD AUTO: 13.6 K/UL (ref 4.8–10.8)

## 2020-12-23 PROCEDURE — 84145 PROCALCITONIN (PCT): CPT

## 2020-12-23 PROCEDURE — 700111 HCHG RX REV CODE 636 W/ 250 OVERRIDE (IP): Performed by: HOSPITALIST

## 2020-12-23 PROCEDURE — 700105 HCHG RX REV CODE 258: Performed by: HOSPITALIST

## 2020-12-23 PROCEDURE — 700102 HCHG RX REV CODE 250 W/ 637 OVERRIDE(OP): Performed by: INTERNAL MEDICINE

## 2020-12-23 PROCEDURE — 700111 HCHG RX REV CODE 636 W/ 250 OVERRIDE (IP): Performed by: EMERGENCY MEDICINE

## 2020-12-23 PROCEDURE — 93010 ELECTROCARDIOGRAM REPORT: CPT | Performed by: INTERNAL MEDICINE

## 2020-12-23 PROCEDURE — 87040 BLOOD CULTURE FOR BACTERIA: CPT | Mod: 91

## 2020-12-23 PROCEDURE — 83605 ASSAY OF LACTIC ACID: CPT | Mod: 91

## 2020-12-23 PROCEDURE — 700102 HCHG RX REV CODE 250 W/ 637 OVERRIDE(OP): Performed by: HOSPITALIST

## 2020-12-23 PROCEDURE — 85027 COMPLETE CBC AUTOMATED: CPT

## 2020-12-23 PROCEDURE — A9270 NON-COVERED ITEM OR SERVICE: HCPCS | Performed by: HOSPITALIST

## 2020-12-23 PROCEDURE — A9270 NON-COVERED ITEM OR SERVICE: HCPCS | Performed by: INTERNAL MEDICINE

## 2020-12-23 PROCEDURE — 71045 X-RAY EXAM CHEST 1 VIEW: CPT

## 2020-12-23 PROCEDURE — 94640 AIRWAY INHALATION TREATMENT: CPT

## 2020-12-23 PROCEDURE — 80048 BASIC METABOLIC PNL TOTAL CA: CPT

## 2020-12-23 PROCEDURE — 93005 ELECTROCARDIOGRAM TRACING: CPT | Performed by: HOSPITALIST

## 2020-12-23 PROCEDURE — 770020 HCHG ROOM/CARE - TELE (206)

## 2020-12-23 PROCEDURE — 99232 SBSQ HOSP IP/OBS MODERATE 35: CPT | Performed by: HOSPITALIST

## 2020-12-23 PROCEDURE — 82962 GLUCOSE BLOOD TEST: CPT | Mod: 91

## 2020-12-23 RX ORDER — COLCHICINE 0.6 MG/1
0.6 TABLET ORAL DAILY
Status: DISCONTINUED | OUTPATIENT
Start: 2020-12-23 | End: 2020-12-23

## 2020-12-23 RX ORDER — BUDESONIDE AND FORMOTEROL FUMARATE DIHYDRATE 80; 4.5 UG/1; UG/1
2 AEROSOL RESPIRATORY (INHALATION)
Status: DISCONTINUED | OUTPATIENT
Start: 2020-12-23 | End: 2020-12-23

## 2020-12-23 RX ORDER — AMOXICILLIN AND CLAVULANATE POTASSIUM 875; 125 MG/1; MG/1
1 TABLET, FILM COATED ORAL EVERY 12 HOURS
Status: DISCONTINUED | OUTPATIENT
Start: 2020-12-23 | End: 2020-12-23

## 2020-12-23 RX ORDER — SODIUM CHLORIDE, SODIUM LACTATE, POTASSIUM CHLORIDE, CALCIUM CHLORIDE 600; 310; 30; 20 MG/100ML; MG/100ML; MG/100ML; MG/100ML
INJECTION, SOLUTION INTRAVENOUS CONTINUOUS
Status: DISCONTINUED | OUTPATIENT
Start: 2020-12-23 | End: 2020-12-25

## 2020-12-23 RX ORDER — IBUPROFEN 600 MG/1
600 TABLET ORAL 3 TIMES DAILY
Status: DISCONTINUED | OUTPATIENT
Start: 2020-12-23 | End: 2020-12-23

## 2020-12-23 RX ORDER — SODIUM CHLORIDE, SODIUM LACTATE, POTASSIUM CHLORIDE, AND CALCIUM CHLORIDE .6; .31; .03; .02 G/100ML; G/100ML; G/100ML; G/100ML
1000 INJECTION, SOLUTION INTRAVENOUS ONCE
Status: COMPLETED | OUTPATIENT
Start: 2020-12-23 | End: 2020-12-23

## 2020-12-23 RX ORDER — SODIUM CHLORIDE 9 MG/ML
INJECTION, SOLUTION INTRAVENOUS CONTINUOUS
Status: DISCONTINUED | OUTPATIENT
Start: 2020-12-23 | End: 2020-12-23

## 2020-12-23 RX ORDER — DOXYCYCLINE 100 MG/1
100 TABLET ORAL EVERY 12 HOURS
Status: COMPLETED | OUTPATIENT
Start: 2020-12-23 | End: 2020-12-30

## 2020-12-23 RX ADMIN — AMPICILLIN SODIUM AND SULBACTAM SODIUM 3 G: 2; 1 INJECTION, POWDER, FOR SOLUTION INTRAMUSCULAR; INTRAVENOUS at 17:17

## 2020-12-23 RX ADMIN — SODIUM CHLORIDE, POTASSIUM CHLORIDE, SODIUM LACTATE AND CALCIUM CHLORIDE 1000 ML: 600; 310; 30; 20 INJECTION, SOLUTION INTRAVENOUS at 16:23

## 2020-12-23 RX ADMIN — SODIUM CHLORIDE, POTASSIUM CHLORIDE, SODIUM LACTATE AND CALCIUM CHLORIDE: 600; 310; 30; 20 INJECTION, SOLUTION INTRAVENOUS at 17:30

## 2020-12-23 RX ADMIN — OXYCODONE HYDROCHLORIDE 5 MG: 5 TABLET ORAL at 06:27

## 2020-12-23 RX ADMIN — INSULIN GLARGINE 11 UNITS: 100 INJECTION, SOLUTION SUBCUTANEOUS at 17:30

## 2020-12-23 RX ADMIN — ACETAMINOPHEN 650 MG: 325 TABLET, FILM COATED ORAL at 17:18

## 2020-12-23 RX ADMIN — OMEPRAZOLE 20 MG: 20 CAPSULE, DELAYED RELEASE ORAL at 05:35

## 2020-12-23 RX ADMIN — IBUPROFEN 600 MG: 600 TABLET, FILM COATED ORAL at 12:44

## 2020-12-23 RX ADMIN — COLCHICINE 0.6 MG: 0.6 TABLET ORAL at 12:07

## 2020-12-23 RX ADMIN — DOXYCYCLINE 100 MG: 100 TABLET, FILM COATED ORAL at 17:18

## 2020-12-23 RX ADMIN — GABAPENTIN 400 MG: 400 CAPSULE ORAL at 17:18

## 2020-12-23 RX ADMIN — GABAPENTIN 400 MG: 400 CAPSULE ORAL at 05:35

## 2020-12-23 RX ADMIN — INSULIN HUMAN 3 UNITS: 100 INJECTION, SOLUTION PARENTERAL at 20:27

## 2020-12-23 RX ADMIN — ACETAMINOPHEN 650 MG: 325 TABLET, FILM COATED ORAL at 02:53

## 2020-12-23 RX ADMIN — ENOXAPARIN SODIUM 40 MG: 40 INJECTION SUBCUTANEOUS at 05:35

## 2020-12-23 RX ADMIN — BUDESONIDE AND FORMOTEROL FUMARATE DIHYDRATE 2 PUFF: 80; 4.5 AEROSOL RESPIRATORY (INHALATION) at 12:44

## 2020-12-23 RX ADMIN — OXYCODONE HYDROCHLORIDE 5 MG: 5 TABLET ORAL at 19:56

## 2020-12-23 RX ADMIN — INSULIN HUMAN 3 UNITS: 100 INJECTION, SOLUTION PARENTERAL at 12:08

## 2020-12-23 RX ADMIN — AMPICILLIN SODIUM AND SULBACTAM SODIUM 3 G: 2; 1 INJECTION, POWDER, FOR SOLUTION INTRAMUSCULAR; INTRAVENOUS at 23:28

## 2020-12-23 RX ADMIN — ESCITALOPRAM OXALATE 10 MG: 10 TABLET ORAL at 05:35

## 2020-12-23 RX ADMIN — INSULIN HUMAN 3 UNITS: 100 INJECTION, SOLUTION PARENTERAL at 17:31

## 2020-12-23 RX ADMIN — SODIUM CHLORIDE: 9 INJECTION, SOLUTION INTRAVENOUS at 12:07

## 2020-12-23 ASSESSMENT — LIFESTYLE VARIABLES
AVERAGE NUMBER OF DAYS PER WEEK YOU HAVE A DRINK CONTAINING ALCOHOL: 0
TOTAL SCORE: 0
TOTAL SCORE: 0
HOW MANY TIMES IN THE PAST YEAR HAVE YOU HAD 5 OR MORE DRINKS IN A DAY: 0
ON A TYPICAL DAY WHEN YOU DRINK ALCOHOL HOW MANY DRINKS DO YOU HAVE: 0
ALCOHOL_USE: NO
HAVE PEOPLE ANNOYED YOU BY CRITICIZING YOUR DRINKING: NO
HAVE YOU EVER FELT YOU SHOULD CUT DOWN ON YOUR DRINKING: NO
CONSUMPTION TOTAL: NEGATIVE
EVER HAD A DRINK FIRST THING IN THE MORNING TO STEADY YOUR NERVES TO GET RID OF A HANGOVER: NO
EVER FELT BAD OR GUILTY ABOUT YOUR DRINKING: NO
TOTAL SCORE: 0

## 2020-12-23 ASSESSMENT — ENCOUNTER SYMPTOMS
NAUSEA: 0
SHORTNESS OF BREATH: 1
COUGH: 0
SORE THROAT: 0
PALPITATIONS: 0
ABDOMINAL PAIN: 0
HEADACHES: 0
NERVOUS/ANXIOUS: 1
FEVER: 0
DIZZINESS: 0

## 2020-12-23 ASSESSMENT — PAIN DESCRIPTION - PAIN TYPE
TYPE: ACUTE PAIN

## 2020-12-23 NOTE — DOCUMENTATION QUERY
[facility]                                                                       Query Response Note      PATIENT:               WARD EUBANKS  ACCT #:                  [id number]  MRN:                     [id number]  :                      1965  ADMIT DATE:       2020 3:25 PM  DISCH DATE:          RESPONDING  PROVIDER #:        [id number]           QUERY TEXT:    Severe acute illness malnutrition is documented in the Registered Dietician evaluation.     Please specify if you:    NOTE:  If an appropriate response is not listed below, please respond with a new note.      The patient's Clinical Indicators include:  Per dietician eval-  Pt meets criteria for severe acute illness related malnutrition in the setting of COVID-19 as evidenced by severe 16.5% weight loss in 10 days and energy intake of </=50% for >/=5 days.     Treatment-  RD evaluation and following; possible tube feeds;  encourage PO intake of meals and supplements    Risks - COVID 19 infection with associated PNA; sepsis; prolonged hospitalization    Thank You,  Luana Bennett RN, CCDS, CDIP, CCS  Clinical  Lead  Connect via eTect  Options provided:   -- Agree with dietician  assessment of severe acute illness malnutrition   -- Disagree with dietician assessment of severe acute illness malnutrition   -- Unable to determine      Query created by: Luana Bennett on 2020 1:11 PM    RESPONSE TEXT:    Agree with dietician assessment of severe acute illness malnutrition          Electronically signed by:  TREE GANDHI MD 2020 2:08 PM

## 2020-12-23 NOTE — PROGRESS NOTES
Monitor strip reviewed by RN, slight elevation noted on strip. STAT EKG order. Patient complainin of chest pain/ lung pain. On call MD paged with update.

## 2020-12-23 NOTE — PROGRESS NOTES
Hospital Medicine Daily Progress Note    Date of Service  12/22/2020    Chief Complaint  Short of breath with diarrhea, vomiting x 2 weeks.    Hospital Course  Malay speaking 55 y.o. male with DMII, neuropathy, admitted 12/8/2020 with COVID-19 infection requiring ICU hospitalization due to O2 demand.    Interval Problem Update  12/15: On high flow NC (60L at 100%). On day 7 of 10 Decadron.  Tolerating diet.     12/16: Alert but ongoing respiratory distress.  Acute worsening this am with sinus tachycardia on telemetry and worsening oxygen saturation.  An ABG was requested and showed pH:7.46/pCO2:34.5/pO2:48.  I alerted Dr Mayo for possible need of bipap or intubation.    12/17: Still tenuous from a respiratory standpoint.  He is anxious at times and a bit scared.  Used  via iPad to communicate with him this morning.  He remains on 90% FiO2 with 60 L O2.    12/18: On 60L 100% and rapid shallow breathing.  Tearful at times.  Denied pain.  Able to talk in short sentences.  Discussed with Dr Mayo     12/19: Remains on high amounts of oxygen and very weak.  He states he is scared and nervous.  He remains on 60L and 100% via HFNC. I have called his sister and updated her on her brother's status.    12/20 Less respiratory distress.  Watching TV.  Some episodes on anxiety and RN states the antivan the other day helped patient.  Eating breakfast.    12/21 Looks better today but still very weak.  Oxygen now down to 50L and 50% via HFNC.  Awake and eating breakfast.  Anxious at times.  Watches TV.    12/22: Patient seen and examined by me today.  He was complaining about chest pain but EKG found Q waves in inferior leads but no significant ST segment changes.  He remains on 50 L, 50% FiO2 of high flow nasal cannula.  Continue incentive spirometer and ambulation to wean off oxygen.  In addition, we will start Lantus to help control blood glucose better.  Consultants/Specialty  Pulmonary    Code Status  Full  Code    Disposition  Transfer to [facility] floor.    Review of Systems  Review of Systems   Constitutional: Positive for malaise/fatigue. Negative for fever.   HENT: Negative for sore throat.    Respiratory: Positive for shortness of breath. Negative for cough.    Cardiovascular: Negative for chest pain, palpitations and leg swelling.   Gastrointestinal: Negative for abdominal pain and nausea.   Genitourinary: Negative for dysuria.   Neurological: Negative for dizziness and headaches.   Psychiatric/Behavioral: The patient is nervous/anxious.         Physical Exam  Temp:  [37.1 °C (98.8 °F)] 37.1 °C (98.8 °F)  Pulse:  [] 96  Resp:  [12-39] 21  BP: ()/(48-72) 107/61  SpO2:  [77 %-95 %] 91 %    Physical Exam  Vitals signs reviewed.   Constitutional:       General: He is in acute distress.      Appearance: He is ill-appearing. He is not diaphoretic.   HENT:      Head: Normocephalic and atraumatic.      Nose: Nose normal.      Mouth/Throat:      Mouth: Mucous membranes are dry.      Pharynx: No oropharyngeal exudate.   Eyes:      General:         Right eye: No discharge.         Left eye: No discharge.      Extraocular Movements: Extraocular movements intact.      Conjunctiva/sclera:      Right eye: Hemorrhage present.      Left eye: Hemorrhage present.   Neck:      Musculoskeletal: Neck supple.   Cardiovascular:      Rate and Rhythm: Regular rhythm. Tachycardia present.      Pulses:           Radial pulses are 2+ on the right side and 2+ on the left side.        Dorsalis pedis pulses are 2+ on the right side and 2+ on the left side.      Heart sounds: No murmur.   Pulmonary:      Effort: Tachypnea and respiratory distress present.      Breath sounds: Rhonchi present. No wheezing.   Abdominal:      General: Bowel sounds are normal. There is no distension.      Palpations: Abdomen is soft.      Tenderness: There is no abdominal tenderness.   Musculoskeletal:         General: No swelling or tenderness.      Right  lower leg: No edema.      Left lower leg: No edema.   Skin:     Coloration: Skin is not jaundiced.      Findings: No bruising.   Neurological:      General: No focal deficit present.      Mental Status: He is alert and oriented to person, place, and time. Mental status is at baseline.      Cranial Nerves: No cranial nerve deficit.   Psychiatric:         Mood and Affect: Mood normal.         Fluids    Intake/Output Summary (Last 24 hours) at 12/22/2020 1741  Last data filed at 12/22/2020 1300  Gross per 24 hour   Intake --   Output 1375 ml   Net -1375 ml       Laboratory  Recent Labs     12/20/20  0430 12/21/20  0120 12/22/20  0545   WBC 21.8* 18.1* 17.4*   RBC 5.13 4.82 4.69*   HEMOGLOBIN 15.4 14.7 14.5   HEMATOCRIT 46.2 43.9 43.1   MCV 90.1 91.1 91.9   MCH 30.0 30.5 30.9   MCHC 33.3* 33.5* 33.6*   RDW 42.2 42.3 43.5   PLATELETCT 285 250 256   MPV 9.6 9.7 9.5     Recent Labs     12/20/20  0430 12/21/20  0120 12/22/20  0545   SODIUM 134* 131* 129*   POTASSIUM 3.7 4.0 3.9   CHLORIDE 95* 93* 89*   CO2 29 29 32   GLUCOSE 118* 153* 178*   BUN 23* 28* 20   CREATININE 0.72 0.82 0.86   CALCIUM 8.9 8.6 8.7                   Imaging  US-EXTREMITY VENOUS LOWER BILAT   Final Result      DX-CHEST-PORTABLE (1 VIEW)   Final Result      No significant change in Covid pneumonia.      US-EXTREMITY VENOUS LOWER BILAT   Final Result      EC-ECHOCARDIOGRAM LTD W/O CONT   Final Result      DX-CHEST-LIMITED (1 VIEW)   Final Result      No significant change in bilateral pneumonia.      DX-CHEST-LIMITED (1 VIEW)   Final Result         Persistent bilateral pulmonary opacities consistent with Covid 19 pneumonia.      DX-CHEST-PORTABLE (1 VIEW)   Final Result      Bilateral peripheral pulmonary airspace process highly suspicious for Covid pneumonia           Assessment/Plan  * Acute hypoxemic respiratory failure (HCC)- (present on admission)  Assessment & Plan  Likely  secondary to Covid pneumonia superimposed to the patient with  pneumonia  Status post course of 10 days Decadron  Encourage incentive spirometer, self proning, titrate oxygen as needed  COVID + as of 12/8    Leukocytosis  Assessment & Plan  12/21 WBC: 18.1  12/20 WBC:21.8  12/19 WBC:22  12/17 WBC:17.6  monitor vitals.  Continue to monitor    Pneumonia due to COVID-19 virus  Assessment & Plan  Tested COVID-19 positive on 12/8  Patient is on high flow,  S/p Decadron 6 mg p.o. daily until 12/17  S/p 5 days course of remdesivir with last dose 12/13.  continue incentive spirometer, provide Zn, vitamin D and ascorbic acid  Encourage proning and mobilization  Patient was treated with empiric IV antibiotics.  WBC and procalcitonin have been downtrending  Ultrasound negative for DVT in bilateral lower extremities, echocardiogram reveals normal LVEF    Anxiety  Assessment & Plan  Ordered Xanax 0.25mg TIDprn   On escitalopram 10mg daily    Elevated d-dimer- (present on admission)  Assessment & Plan  12/16 LE US negative for DVT  Echo from 12/11 reviewed with no right heart failure.    Abnormal LFTs  Assessment & Plan  Suspect due to covid 19   Monitoring CMP (last checked 12/13)  12/17 LFTs showing improvement.  Likely viral etiology    Type 2 diabetes mellitus with hyperglycemia, without long-term current use of insulin (HCC)  Assessment & Plan  Glyco at 7.2, continue hypoglycemia protocol, accu-checks AC and HS    -- diabetic diet  Start lantus 11U for better BG control     Sepsis (HCC)  Assessment & Plan  This is Severe Sepsis Present on admission  SIRS criteria identified on my evaluation include: Tachycardia, with heart rate greater than 90 BPM, Tachypnea, with respirations greater than 20 per minute and Leukocytosis, with WBC greater than 12,000  Source of infection is pulmonary  Clinical indicators of end organ dysfunction include Lactate >2 mmol/L (18.0 mg/dL)  Sepsis protocol initiated  Fluid resuscitation ordered per protocol  IV antibiotics as appropriate for source of  sepsis  Reassessment: I have reassessed the patient's hemodynamic status  End organ dysfunction include(s):  Acute respiratory failure     Neuropathic pain- (present on admission)  Assessment & Plan  gabapentin       VTE prophylaxis: Enoxaparin 40mg daily

## 2020-12-23 NOTE — ASSESSMENT & PLAN NOTE
PA depressions, ST elevations on EKG  Patient has started chest pain  Start colchicine and brief  Limited cardiac echo to evaluate for pericardial effusion since patient is hypotensive and tachycardic

## 2020-12-23 NOTE — PROGRESS NOTES
Patient BP dropping to SBP in 80s while sleeping. When patient repositioned and woken up some BP rebounds to SBP in 90s. Will update MD.   Per on call APRN, continue to monitor update of pt becomes symptomatic.

## 2020-12-23 NOTE — PROGRESS NOTES
Hospital Medicine Daily Progress Note    Date of Service  12/23/2020    Chief Complaint  Short of breath with diarrhea, vomiting x 2 weeks.    Hospital Course  Greenlandic speaking 55 y.o. male with DMII, neuropathy, admitted 12/8/2020 with COVID-19 infection requiring ICU hospitalization due to O2 demand.    Interval Problem Update  12/15: On high flow NC (60L at 100%). On day 7 of 10 Decadron.  Tolerating diet.     12/16: Alert but ongoing respiratory distress.  Acute worsening this am with sinus tachycardia on telemetry and worsening oxygen saturation.  An ABG was requested and showed pH:7.46/pCO2:34.5/pO2:48.  I alerted Dr Mayo for possible need of bipap or intubation.    12/17: Still tenuous from a respiratory standpoint.  He is anxious at times and a bit scared.  Used  via iPad to communicate with him this morning.  He remains on 90% FiO2 with 60 L O2.    12/18: On 60L 100% and rapid shallow breathing.  Tearful at times.  Denied pain.  Able to talk in short sentences.  Discussed with Dr Mayo     12/19: Remains on high amounts of oxygen and very weak.  He states he is scared and nervous.  He remains on 60L and 100% via HFNC. I have called his sister and updated her on her brother's status.    12/20 Less respiratory distress.  Watching TV.  Some episodes on anxiety and RN states the antivan the other day helped patient.  Eating breakfast.    12/21 Looks better today but still very weak.  Oxygen now down to 50L and 50% via HFNC.  Awake and eating breakfast.  Anxious at times.  Watches TV.    12/22: Patient seen and examined by me today.  He was complaining about chest pain but EKG found Q waves in inferior leads but no significant ST segment changes.  He remains on 50 L, 50% FiO2 of high flow nasal cannula.  Continue incentive spirometer and ambulation to wean off oxygen.  In addition, we will start Lantus to help control blood glucose better.    12/23: Patient continues to complain of chest pain..  NV  depressions, ST elevations on EKG indicating pericarditis.  Limited cardiac echo ordered to evaluate for pericardial effusion.  In addition I started him on ibuprofen and Cardizem.  Patient did have increasing oxygen requirement today.  Consultants/Specialty  Pulmonary    Code Status  Full Code    Disposition  Transfer to [facility] floor.    Review of Systems  Review of Systems   Constitutional: Positive for malaise/fatigue. Negative for fever.   HENT: Negative for sore throat.    Respiratory: Positive for shortness of breath. Negative for cough.    Cardiovascular: Negative for chest pain, palpitations and leg swelling.   Gastrointestinal: Negative for abdominal pain and nausea.   Genitourinary: Negative for dysuria.   Neurological: Negative for dizziness and headaches.   Psychiatric/Behavioral: The patient is nervous/anxious.         Physical Exam  Temp:  [36.4 °C (97.5 °F)-37.1 °C (98.8 °F)] 36.4 °C (97.5 °F)  Pulse:  [] 81  Resp:  [0-32] 16  BP: ()/(48-78) 98/73  SpO2:  [75 %-95 %] 93 %    Physical Exam  Vitals signs reviewed.   Constitutional:       General: He is in acute distress.      Appearance: He is ill-appearing. He is not diaphoretic.   HENT:      Head: Normocephalic and atraumatic.      Nose: Nose normal.      Mouth/Throat:      Mouth: Mucous membranes are dry.      Pharynx: No oropharyngeal exudate.   Eyes:      General:         Right eye: No discharge.         Left eye: No discharge.      Extraocular Movements: Extraocular movements intact.      Conjunctiva/sclera:      Right eye: Hemorrhage present.      Left eye: Hemorrhage present.   Neck:      Musculoskeletal: Neck supple.   Cardiovascular:      Rate and Rhythm: Regular rhythm. Tachycardia present.      Pulses:           Radial pulses are 2+ on the right side and 2+ on the left side.        Dorsalis pedis pulses are 2+ on the right side and 2+ on the left side.      Heart sounds: No murmur.   Pulmonary:      Effort: Tachypnea and  respiratory distress present.      Breath sounds: Rhonchi present. No wheezing.   Abdominal:      General: Bowel sounds are normal. There is no distension.      Palpations: Abdomen is soft.      Tenderness: There is no abdominal tenderness.   Musculoskeletal:         General: No swelling or tenderness.      Right lower leg: No edema.      Left lower leg: No edema.   Skin:     Coloration: Skin is not jaundiced.      Findings: No bruising.   Neurological:      General: No focal deficit present.      Mental Status: He is alert and oriented to person, place, and time. Mental status is at baseline.      Cranial Nerves: No cranial nerve deficit.   Psychiatric:         Mood and Affect: Mood normal.         Fluids    Intake/Output Summary (Last 24 hours) at 12/23/2020 1113  Last data filed at 12/23/2020 0254  Gross per 24 hour   Intake 420 ml   Output 625 ml   Net -205 ml       Laboratory  Recent Labs     12/21/20  0120 12/22/20  0545 12/23/20  0640   WBC 18.1* 17.4* 13.6*   RBC 4.82 4.69* 4.59*   HEMOGLOBIN 14.7 14.5 13.8*   HEMATOCRIT 43.9 43.1 42.6   MCV 91.1 91.9 92.8   MCH 30.5 30.9 30.1   MCHC 33.5* 33.6* 32.4*   RDW 42.3 43.5 43.8   PLATELETCT 250 256 272   MPV 9.7 9.5 9.7     Recent Labs     12/21/20  0120 12/22/20  0545 12/23/20  0640   SODIUM 131* 129* 127*   POTASSIUM 4.0 3.9 4.4   CHLORIDE 93* 89* 89*   CO2 29 32 32   GLUCOSE 153* 178* 123*   BUN 28* 20 15   CREATININE 0.82 0.86 0.77   CALCIUM 8.6 8.7 9.1                   Imaging  US-EXTREMITY VENOUS LOWER BILAT   Final Result      DX-CHEST-PORTABLE (1 VIEW)   Final Result      No significant change in Covid pneumonia.      US-EXTREMITY VENOUS LOWER BILAT   Final Result      EC-ECHOCARDIOGRAM LTD W/O CONT   Final Result      DX-CHEST-LIMITED (1 VIEW)   Final Result      No significant change in bilateral pneumonia.      DX-CHEST-LIMITED (1 VIEW)   Final Result         Persistent bilateral pulmonary opacities consistent with Covid 19 pneumonia.       DX-CHEST-PORTABLE (1 VIEW)   Final Result      Bilateral peripheral pulmonary airspace process highly suspicious for Covid pneumonia      EC-ECHOCARDIOGRAM LTD W/O CONT    (Results Pending)        Assessment/Plan  * Acute hypoxemic respiratory failure (HCC)- (present on admission)  Assessment & Plan  Likely  secondary to Covid pneumonia superimposed to the patient with pneumonia  Status post course of 10 days Decadron  Encourage incentive spirometer, self proning, titrate oxygen as needed  COVID + as of 12/8    Leukocytosis  Assessment & Plan  12/21 WBC: 18.1  12/20 WBC:21.8  12/19 WBC:22  12/17 WBC:17.6  monitor vitals.  Continue to monitor    Pneumonia due to COVID-19 virus  Assessment & Plan  Tested COVID-19 positive on 12/8  Patient is on high flow,  S/p Decadron 6 mg p.o. daily until 12/17  S/p 5 days course of remdesivir with last dose 12/13.  continue incentive spirometer, provide Zn, vitamin D and ascorbic acid  Encourage proning and mobilization  Patient was treated with empiric IV antibiotics.  WBC and procalcitonin have been downtrending  Ultrasound negative for DVT in bilateral lower extremities, echocardiogram reveals normal LVEF    Anxiety  Assessment & Plan  Ordered Xanax 0.25mg TIDprn   On escitalopram 10mg daily    Elevated d-dimer- (present on admission)  Assessment & Plan  12/16 LE US negative for DVT  Echo from 12/11 reviewed with no right heart failure.    Abnormal LFTs  Assessment & Plan  Suspect due to covid 19   Monitoring CMP (last checked 12/13)  12/17 LFTs showing improvement.  Likely viral etiology    Acute pericarditis  Assessment & Plan  VT depressions, ST elevations on EKG  Patient has started chest pain  Start colchicine and brief  Limited cardiac echo to evaluate for pericardial effusion since patient is hypotensive and tachycardic    Type 2 diabetes mellitus with hyperglycemia, without long-term current use of insulin (HCC)  Assessment & Plan  Glyco at 7.2, continue hypoglycemia  protocol, accu-checks AC and HS    -- diabetic diet  Start lantus 11U for better BG control     Sepsis (HCC)  Assessment & Plan  This is Severe Sepsis Present on admission  SIRS criteria identified on my evaluation include: Tachycardia, with heart rate greater than 90 BPM, Tachypnea, with respirations greater than 20 per minute and Leukocytosis, with WBC greater than 12,000  Source of infection is pulmonary  Clinical indicators of end organ dysfunction include Lactate >2 mmol/L (18.0 mg/dL)  Sepsis protocol initiated  Fluid resuscitation ordered per protocol  IV antibiotics as appropriate for source of sepsis  Reassessment: I have reassessed the patient's hemodynamic status  End organ dysfunction include(s):  Acute respiratory failure     Neuropathic pain- (present on admission)  Assessment & Plan  gabapentin       VTE prophylaxis: Enoxaparin 40mg daily

## 2020-12-24 ENCOUNTER — APPOINTMENT (OUTPATIENT)
Dept: CARDIOLOGY | Facility: MEDICAL CENTER | Age: 55
DRG: 871 | End: 2020-12-24
Attending: HOSPITALIST
Payer: COMMERCIAL

## 2020-12-24 PROBLEM — J69.0 ASPIRATION PNEUMONIA (HCC): Status: ACTIVE | Noted: 2020-12-24

## 2020-12-24 PROBLEM — I30.9 ACUTE PERICARDITIS: Status: RESOLVED | Noted: 2020-12-23 | Resolved: 2020-12-24

## 2020-12-24 PROBLEM — A41.9 SEPSIS (HCC): Status: RESOLVED | Noted: 2020-12-08 | Resolved: 2020-12-24

## 2020-12-24 LAB
ANION GAP SERPL CALC-SCNC: 6 MMOL/L (ref 7–16)
BUN SERPL-MCNC: 14 MG/DL (ref 8–22)
CALCIUM SERPL-MCNC: 8.9 MG/DL (ref 8.5–10.5)
CHLORIDE SERPL-SCNC: 97 MMOL/L (ref 96–112)
CO2 SERPL-SCNC: 32 MMOL/L (ref 20–33)
CREAT SERPL-MCNC: 0.73 MG/DL (ref 0.5–1.4)
ERYTHROCYTE [DISTWIDTH] IN BLOOD BY AUTOMATED COUNT: 44.5 FL (ref 35.9–50)
GLUCOSE BLD-MCNC: 132 MG/DL (ref 65–99)
GLUCOSE BLD-MCNC: 159 MG/DL (ref 65–99)
GLUCOSE BLD-MCNC: 163 MG/DL (ref 65–99)
GLUCOSE SERPL-MCNC: 118 MG/DL (ref 65–99)
HCT VFR BLD AUTO: 43.2 % (ref 42–52)
HGB BLD-MCNC: 14 G/DL (ref 14–18)
LACTATE BLD-SCNC: 2.2 MMOL/L (ref 0.5–2)
LV EJECT FRACT  99904: 75
LV EJECT FRACT MOD 2C 99903: 79.84
LV EJECT FRACT MOD 4C 99902: 79.87
LV EJECT FRACT MOD BP 99901: 80.38
MCH RBC QN AUTO: 30.2 PG (ref 27–33)
MCHC RBC AUTO-ENTMCNC: 32.4 G/DL (ref 33.7–35.3)
MCV RBC AUTO: 93.1 FL (ref 81.4–97.8)
PLATELET # BLD AUTO: 289 K/UL (ref 164–446)
PMV BLD AUTO: 10.2 FL (ref 9–12.9)
POTASSIUM SERPL-SCNC: 4.4 MMOL/L (ref 3.6–5.5)
RBC # BLD AUTO: 4.64 M/UL (ref 4.7–6.1)
SODIUM SERPL-SCNC: 135 MMOL/L (ref 135–145)
WBC # BLD AUTO: 13.9 K/UL (ref 4.8–10.8)

## 2020-12-24 PROCEDURE — A9270 NON-COVERED ITEM OR SERVICE: HCPCS | Performed by: INTERNAL MEDICINE

## 2020-12-24 PROCEDURE — A9270 NON-COVERED ITEM OR SERVICE: HCPCS | Performed by: HOSPITALIST

## 2020-12-24 PROCEDURE — 80048 BASIC METABOLIC PNL TOTAL CA: CPT

## 2020-12-24 PROCEDURE — 82962 GLUCOSE BLOOD TEST: CPT | Mod: 91

## 2020-12-24 PROCEDURE — 700111 HCHG RX REV CODE 636 W/ 250 OVERRIDE (IP): Performed by: HOSPITALIST

## 2020-12-24 PROCEDURE — 770020 HCHG ROOM/CARE - TELE (206)

## 2020-12-24 PROCEDURE — 93325 DOPPLER ECHO COLOR FLOW MAPG: CPT | Mod: 26 | Performed by: INTERNAL MEDICINE

## 2020-12-24 PROCEDURE — 94640 AIRWAY INHALATION TREATMENT: CPT

## 2020-12-24 PROCEDURE — 700102 HCHG RX REV CODE 250 W/ 637 OVERRIDE(OP): Performed by: HOSPITALIST

## 2020-12-24 PROCEDURE — 93325 DOPPLER ECHO COLOR FLOW MAPG: CPT

## 2020-12-24 PROCEDURE — 85027 COMPLETE CBC AUTOMATED: CPT

## 2020-12-24 PROCEDURE — 83605 ASSAY OF LACTIC ACID: CPT

## 2020-12-24 PROCEDURE — 700111 HCHG RX REV CODE 636 W/ 250 OVERRIDE (IP): Performed by: EMERGENCY MEDICINE

## 2020-12-24 PROCEDURE — 700105 HCHG RX REV CODE 258: Performed by: HOSPITALIST

## 2020-12-24 PROCEDURE — 700102 HCHG RX REV CODE 250 W/ 637 OVERRIDE(OP): Performed by: INTERNAL MEDICINE

## 2020-12-24 PROCEDURE — 93308 TTE F-UP OR LMTD: CPT | Mod: 26 | Performed by: INTERNAL MEDICINE

## 2020-12-24 PROCEDURE — 99233 SBSQ HOSP IP/OBS HIGH 50: CPT | Performed by: HOSPITALIST

## 2020-12-24 RX ORDER — SODIUM CHLORIDE, SODIUM LACTATE, POTASSIUM CHLORIDE, AND CALCIUM CHLORIDE .6; .31; .03; .02 G/100ML; G/100ML; G/100ML; G/100ML
500 INJECTION, SOLUTION INTRAVENOUS
Status: DISCONTINUED | OUTPATIENT
Start: 2020-12-24 | End: 2020-12-29

## 2020-12-24 RX ADMIN — OMEPRAZOLE 20 MG: 20 CAPSULE, DELAYED RELEASE ORAL at 05:32

## 2020-12-24 RX ADMIN — OXYCODONE HYDROCHLORIDE 5 MG: 5 TABLET ORAL at 03:53

## 2020-12-24 RX ADMIN — ESCITALOPRAM OXALATE 10 MG: 10 TABLET ORAL at 05:31

## 2020-12-24 RX ADMIN — PIPERACILLIN AND TAZOBACTAM 4.5 G: 4; .5 INJECTION, POWDER, LYOPHILIZED, FOR SOLUTION INTRAVENOUS; PARENTERAL at 17:41

## 2020-12-24 RX ADMIN — SODIUM CHLORIDE, POTASSIUM CHLORIDE, SODIUM LACTATE AND CALCIUM CHLORIDE: 600; 310; 30; 20 INJECTION, SOLUTION INTRAVENOUS at 14:16

## 2020-12-24 RX ADMIN — AMPICILLIN SODIUM AND SULBACTAM SODIUM 3 G: 2; 1 INJECTION, POWDER, FOR SOLUTION INTRAMUSCULAR; INTRAVENOUS at 05:31

## 2020-12-24 RX ADMIN — DOXYCYCLINE 100 MG: 100 TABLET, FILM COATED ORAL at 17:41

## 2020-12-24 RX ADMIN — SODIUM CHLORIDE, POTASSIUM CHLORIDE, SODIUM LACTATE AND CALCIUM CHLORIDE: 600; 310; 30; 20 INJECTION, SOLUTION INTRAVENOUS at 04:57

## 2020-12-24 RX ADMIN — GABAPENTIN 400 MG: 400 CAPSULE ORAL at 05:31

## 2020-12-24 RX ADMIN — ENOXAPARIN SODIUM 40 MG: 40 INJECTION SUBCUTANEOUS at 05:31

## 2020-12-24 RX ADMIN — PIPERACILLIN AND TAZOBACTAM 4.5 G: 4; .5 INJECTION, POWDER, LYOPHILIZED, FOR SOLUTION INTRAVENOUS; PARENTERAL at 16:18

## 2020-12-24 RX ADMIN — OXYCODONE HYDROCHLORIDE 5 MG: 5 TABLET ORAL at 10:20

## 2020-12-24 RX ADMIN — AMPICILLIN SODIUM AND SULBACTAM SODIUM 3 G: 2; 1 INJECTION, POWDER, FOR SOLUTION INTRAMUSCULAR; INTRAVENOUS at 13:30

## 2020-12-24 RX ADMIN — INSULIN GLARGINE 11 UNITS: 100 INJECTION, SOLUTION SUBCUTANEOUS at 17:43

## 2020-12-24 RX ADMIN — GABAPENTIN 400 MG: 400 CAPSULE ORAL at 17:41

## 2020-12-24 RX ADMIN — OXYCODONE HYDROCHLORIDE 5 MG: 5 TABLET ORAL at 20:56

## 2020-12-24 RX ADMIN — INSULIN HUMAN 3 UNITS: 100 INJECTION, SOLUTION PARENTERAL at 12:22

## 2020-12-24 RX ADMIN — DOXYCYCLINE 100 MG: 100 TABLET, FILM COATED ORAL at 05:31

## 2020-12-24 RX ADMIN — ALPRAZOLAM 0.25 MG: 0.25 TABLET ORAL at 15:12

## 2020-12-24 ASSESSMENT — COGNITIVE AND FUNCTIONAL STATUS - GENERAL
SUGGESTED CMS G CODE MODIFIER DAILY ACTIVITY: CJ
HELP NEEDED FOR BATHING: A LITTLE
DRESSING REGULAR UPPER BODY CLOTHING: A LITTLE
TOILETING: A LITTLE
MOVING FROM LYING ON BACK TO SITTING ON SIDE OF FLAT BED: A LOT
CLIMB 3 TO 5 STEPS WITH RAILING: TOTAL
STANDING UP FROM CHAIR USING ARMS: A LOT
DRESSING REGULAR LOWER BODY CLOTHING: A LITTLE
MOBILITY SCORE: 15
WALKING IN HOSPITAL ROOM: A LOT
DAILY ACTIVITIY SCORE: 20
SUGGESTED CMS G CODE MODIFIER MOBILITY: CK

## 2020-12-24 ASSESSMENT — PAIN DESCRIPTION - PAIN TYPE
TYPE: ACUTE PAIN
TYPE: CHRONIC PAIN

## 2020-12-24 ASSESSMENT — ENCOUNTER SYMPTOMS
SHORTNESS OF BREATH: 1
COUGH: 0
SORE THROAT: 0
PALPITATIONS: 0
FEVER: 0
NAUSEA: 0
NERVOUS/ANXIOUS: 1
DIZZINESS: 0
HEADACHES: 0
ABDOMINAL PAIN: 0

## 2020-12-24 ASSESSMENT — FIBROSIS 4 INDEX: FIB4 SCORE: 1.07

## 2020-12-24 NOTE — PROGRESS NOTES
Hospital Medicine Daily Progress Note    Date of Service  12/24/2020    Chief Complaint  Short of breath with diarrhea, vomiting x 2 weeks.    Hospital Course  English speaking 55 y.o. male with DMII, neuropathy, admitted 12/8/2020 with COVID-19 infection requiring ICU hospitalization due to O2 demand.    Interval Problem Update  12/15: On high flow NC (60L at 100%). On day 7 of 10 Decadron.  Tolerating diet.     12/16: Alert but ongoing respiratory distress.  Acute worsening this am with sinus tachycardia on telemetry and worsening oxygen saturation.  An ABG was requested and showed pH:7.46/pCO2:34.5/pO2:48.  I alerted Dr Mayo for possible need of bipap or intubation.    12/17: Still tenuous from a respiratory standpoint.  He is anxious at times and a bit scared.  Used  via iPad to communicate with him this morning.  He remains on 90% FiO2 with 60 L O2.    12/18: On 60L 100% and rapid shallow breathing.  Tearful at times.  Denied pain.  Able to talk in short sentences.  Discussed with Dr Mayo     12/19: Remains on high amounts of oxygen and very weak.  He states he is scared and nervous.  He remains on 60L and 100% via HFNC. I have called his sister and updated her on her brother's status.    12/20 Less respiratory distress.  Watching TV.  Some episodes on anxiety and RN states the antivan the other day helped patient.  Eating breakfast.    12/21 Looks better today but still very weak.  Oxygen now down to 50L and 50% via HFNC.  Awake and eating breakfast.  Anxious at times.  Watches TV.    12/22: Patient seen and examined by me today.  He was complaining about chest pain but EKG found Q waves in inferior leads but no significant ST segment changes.  He remains on 50 L, 50% FiO2 of high flow nasal cannula.  Continue incentive spirometer and ambulation to wean off oxygen.  In addition, we will start Lantus to help control blood glucose better.    12/23: Patient continues to complain of chest pain..  NV  depressions, ST elevations on EKG indicating pericarditis.  Limited cardiac echo ordered to evaluate for pericardial effusion.  In addition I started him on ibuprofen and Cardizem.  Patient did have increasing oxygen requirement today.    12/24: Overnight patient found to be septic protocol was started.  So far cultures have remained negative.  Likely source is a left-sided pneumonia found on chest x-ray.  This is likely cause of his pleuritic chest pain as well.  IV Unasyn and doxycycline has been started.  Since patient still spiking fevers and has lactic acidosis I will transition him to IV Zosyn.  Consultants/Specialty  Pulmonary    Code Status  Full Code    Disposition  Transfer to [facility] floor.    Review of Systems  Review of Systems   Constitutional: Positive for malaise/fatigue. Negative for fever.   HENT: Negative for sore throat.    Respiratory: Positive for shortness of breath. Negative for cough.    Cardiovascular: Negative for chest pain, palpitations and leg swelling.   Gastrointestinal: Negative for abdominal pain and nausea.   Genitourinary: Negative for dysuria.   Neurological: Negative for dizziness and headaches.   Psychiatric/Behavioral: The patient is nervous/anxious.         Physical Exam  Temp:  [36.3 °C (97.4 °F)-37.2 °C (99 °F)] 37.2 °C (99 °F)  Pulse:  [] 133  Resp:  [14-63] 14  BP: ()/(57-76) 110/76  SpO2:  [86 %-100 %] 86 %    Physical Exam  Vitals signs reviewed.   Constitutional:       General: He is in acute distress.      Appearance: He is ill-appearing. He is not diaphoretic.   HENT:      Head: Normocephalic and atraumatic.      Nose: Nose normal.      Mouth/Throat:      Mouth: Mucous membranes are dry.      Pharynx: No oropharyngeal exudate.   Eyes:      General:         Right eye: No discharge.         Left eye: No discharge.      Extraocular Movements: Extraocular movements intact.      Conjunctiva/sclera:      Right eye: Hemorrhage present.      Left eye: Hemorrhage  present.   Neck:      Musculoskeletal: Neck supple.   Cardiovascular:      Rate and Rhythm: Regular rhythm. Tachycardia present.      Pulses:           Radial pulses are 2+ on the right side and 2+ on the left side.        Dorsalis pedis pulses are 2+ on the right side and 2+ on the left side.      Heart sounds: No murmur.   Pulmonary:      Effort: Tachypnea and respiratory distress present.      Breath sounds: Rhonchi present. No wheezing.   Abdominal:      General: Bowel sounds are normal. There is no distension.      Palpations: Abdomen is soft.      Tenderness: There is no abdominal tenderness.   Musculoskeletal:         General: No swelling or tenderness.      Right lower leg: No edema.      Left lower leg: No edema.   Skin:     Coloration: Skin is not jaundiced.      Findings: No bruising.   Neurological:      General: No focal deficit present.      Mental Status: He is alert and oriented to person, place, and time. Mental status is at baseline.      Cranial Nerves: No cranial nerve deficit.   Psychiatric:         Mood and Affect: Mood normal.         Fluids    Intake/Output Summary (Last 24 hours) at 12/24/2020 1436  Last data filed at 12/24/2020 1200  Gross per 24 hour   Intake 4368.99 ml   Output 1900 ml   Net 2468.99 ml       Laboratory  Recent Labs     12/22/20  0545 12/23/20  0640 12/24/20  0500   WBC 17.4* 13.6* 13.9*   RBC 4.69* 4.59* 4.64*   HEMOGLOBIN 14.5 13.8* 14.0   HEMATOCRIT 43.1 42.6 43.2   MCV 91.9 92.8 93.1   MCH 30.9 30.1 30.2   MCHC 33.6* 32.4* 32.4*   RDW 43.5 43.8 44.5   PLATELETCT 256 272 289   MPV 9.5 9.7 10.2     Recent Labs     12/22/20  0545 12/23/20  0640 12/24/20  0445   SODIUM 129* 127* 135   POTASSIUM 3.9 4.4 4.4   CHLORIDE 89* 89* 97   CO2 32 32 32   GLUCOSE 178* 123* 118*   BUN 20 15 14   CREATININE 0.86 0.77 0.73   CALCIUM 8.7 9.1 8.9                   Imaging  EC-ECHOCARDIOGRAM LTD W/O CONT   Final Result      DX-CHEST-PORTABLE (1 VIEW)   Final Result      Persistent right  greater left, consolidation consistent with Covid 19 pneumonia.      US-EXTREMITY VENOUS LOWER BILAT   Final Result      DX-CHEST-PORTABLE (1 VIEW)   Final Result      No significant change in Covid pneumonia.      US-EXTREMITY VENOUS LOWER BILAT   Final Result      EC-ECHOCARDIOGRAM LTD W/O CONT   Final Result      DX-CHEST-LIMITED (1 VIEW)   Final Result      No significant change in bilateral pneumonia.      DX-CHEST-LIMITED (1 VIEW)   Final Result         Persistent bilateral pulmonary opacities consistent with Covid 19 pneumonia.      DX-CHEST-PORTABLE (1 VIEW)   Final Result      Bilateral peripheral pulmonary airspace process highly suspicious for Covid pneumonia           Assessment/Plan  * Acute hypoxemic respiratory failure (HCC)- (present on admission)  Assessment & Plan  Likely  secondary to Covid pneumonia superimposed to the patient with pneumonia  Status post course of 10 days Decadron  Encourage incentive spirometer, self proning, titrate oxygen as needed  COVID + as of 12/8    Leukocytosis  Assessment & Plan  12/21 WBC: 18.1  12/20 WBC:21.8  12/19 WBC:22  12/17 WBC:17.6  monitor vitals.  Continue to monitor    Pneumonia due to COVID-19 virus  Assessment & Plan  Tested COVID-19 positive on 12/8  Patient is on high flow,  S/p Decadron 6 mg p.o. daily until 12/17  S/p 5 days course of remdesivir with last dose 12/13.  continue incentive spirometer, provide Zn, vitamin D and ascorbic acid  Encourage proning and mobilization  Patient was treated with empiric IV antibiotics.  WBC and procalcitonin have been downtrending  Ultrasound negative for DVT in bilateral lower extremities, echocardiogram reveals normal LVEF    Anxiety  Assessment & Plan  Ordered Xanax 0.25mg TIDprn   On escitalopram 10mg daily    Elevated d-dimer- (present on admission)  Assessment & Plan  12/16 LE US negative for DVT  Echo from 12/11 reviewed with no right heart failure.    Abnormal LFTs  Assessment & Plan  Suspect due to covid 19    Monitoring CMP (last checked 12/13)  12/17 LFTs showing improvement.  Likely viral etiology    Aspiration pneumonia (HCC)  Assessment & Plan  Found to have left sided infiltrate  Bronchial breath sounds and egophony on the left lung exam   Started on Cap antibiotics including (Zosyn and doxycyline)   Follow up sputum and blood cultures  Respiratory care protocol   Placed on o2 therapy       Type 2 diabetes mellitus with hyperglycemia, without long-term current use of insulin ([facility])  Assessment & Plan  Glyco at 7.2, continue hypoglycemia protocol, accu-checks AC and HS    -- diabetic diet  Start lantus 11U for better BG control     Sepsis ([facility])  Assessment & Plan  This is Severe Sepsis Present on admission  SIRS criteria identified on my evaluation include: Tachycardia, with heart rate greater than 90 BPM, Tachypnea, with respirations greater than 20 per minute and Leukocytosis, with WBC greater than 12,000  Source of infection is pulmonary  Clinical indicators of end organ dysfunction include Lactate >2 mmol/L (18.0 mg/dL)  Sepsis protocol initiated  Fluid resuscitation ordered per protocol  IV antibiotics as appropriate for source of sepsis  Reassessment: I have reassessed the patient's hemodynamic status  End organ dysfunction include(s):  Acute respiratory failure     Neuropathic pain- (present on admission)  Assessment & Plan  gabapentin       VTE prophylaxis: Enoxaparin 40mg daily

## 2020-12-24 NOTE — CARE PLAN
Problem: Nutritional:  Goal: Achieve adequate nutritional intake  Description: Patient will consume 50% of meals + supplements  Outcome: NOT MET     Only 1 meal recorded in ADLs since 12/20: 50-75% of dinner and % of Boost supplement last night (12/23).  No weight taken since 12/19, so RD unable to assess trends.   Reached out to RN for more information, but no reply @ this time.    RD continues to follow. However, need more detailed records to provide a valuable evaluation of pt's nutritional status.

## 2020-12-24 NOTE — ASSESSMENT & PLAN NOTE
Found to have left sided infiltrate  Started on HCAP antibiotics including (Zosyn and doxycyline) and de-escalated to Augmentin/doxy (procalcitonin had been 5 after admit though went down to 0.4 on 12/26.  Completed abx course on 12/30.   Negative blood cultures from 12/23  Respiratory care protocol   Placed on o2 therapy   Titrate O2 as tolerable

## 2020-12-25 LAB
ANION GAP SERPL CALC-SCNC: 8 MMOL/L (ref 7–16)
BUN SERPL-MCNC: 9 MG/DL (ref 8–22)
CALCIUM SERPL-MCNC: 8.6 MG/DL (ref 8.5–10.5)
CHLORIDE SERPL-SCNC: 95 MMOL/L (ref 96–112)
CO2 SERPL-SCNC: 27 MMOL/L (ref 20–33)
CREAT SERPL-MCNC: 0.72 MG/DL (ref 0.5–1.4)
ERYTHROCYTE [DISTWIDTH] IN BLOOD BY AUTOMATED COUNT: 44.7 FL (ref 35.9–50)
GLUCOSE BLD-MCNC: 105 MG/DL (ref 65–99)
GLUCOSE BLD-MCNC: 167 MG/DL (ref 65–99)
GLUCOSE BLD-MCNC: 174 MG/DL (ref 65–99)
GLUCOSE BLD-MCNC: 178 MG/DL (ref 65–99)
GLUCOSE SERPL-MCNC: 112 MG/DL (ref 65–99)
HCT VFR BLD AUTO: 35.4 % (ref 42–52)
HGB BLD-MCNC: 11.3 G/DL (ref 14–18)
MCH RBC QN AUTO: 29.8 PG (ref 27–33)
MCHC RBC AUTO-ENTMCNC: 31.9 G/DL (ref 33.7–35.3)
MCV RBC AUTO: 93.4 FL (ref 81.4–97.8)
PLATELET # BLD AUTO: 261 K/UL (ref 164–446)
PMV BLD AUTO: 9.7 FL (ref 9–12.9)
POTASSIUM SERPL-SCNC: 3.9 MMOL/L (ref 3.6–5.5)
RBC # BLD AUTO: 3.79 M/UL (ref 4.7–6.1)
SODIUM SERPL-SCNC: 130 MMOL/L (ref 135–145)
WBC # BLD AUTO: 14.1 K/UL (ref 4.8–10.8)

## 2020-12-25 PROCEDURE — 85027 COMPLETE CBC AUTOMATED: CPT

## 2020-12-25 PROCEDURE — 99232 SBSQ HOSP IP/OBS MODERATE 35: CPT | Performed by: HOSPITALIST

## 2020-12-25 PROCEDURE — 700101 HCHG RX REV CODE 250: Performed by: HOSPITALIST

## 2020-12-25 PROCEDURE — 770020 HCHG ROOM/CARE - TELE (206)

## 2020-12-25 PROCEDURE — A9270 NON-COVERED ITEM OR SERVICE: HCPCS | Performed by: INTERNAL MEDICINE

## 2020-12-25 PROCEDURE — 82962 GLUCOSE BLOOD TEST: CPT | Mod: 91

## 2020-12-25 PROCEDURE — 700102 HCHG RX REV CODE 250 W/ 637 OVERRIDE(OP): Performed by: HOSPITALIST

## 2020-12-25 PROCEDURE — 80048 BASIC METABOLIC PNL TOTAL CA: CPT

## 2020-12-25 PROCEDURE — A9270 NON-COVERED ITEM OR SERVICE: HCPCS | Performed by: HOSPITALIST

## 2020-12-25 PROCEDURE — 700111 HCHG RX REV CODE 636 W/ 250 OVERRIDE (IP): Performed by: EMERGENCY MEDICINE

## 2020-12-25 PROCEDURE — 94640 AIRWAY INHALATION TREATMENT: CPT

## 2020-12-25 PROCEDURE — 700105 HCHG RX REV CODE 258: Performed by: HOSPITALIST

## 2020-12-25 PROCEDURE — 700102 HCHG RX REV CODE 250 W/ 637 OVERRIDE(OP): Performed by: INTERNAL MEDICINE

## 2020-12-25 PROCEDURE — 700111 HCHG RX REV CODE 636 W/ 250 OVERRIDE (IP): Performed by: HOSPITALIST

## 2020-12-25 RX ORDER — IBUPROFEN 800 MG/1
400 TABLET ORAL 3 TIMES DAILY
Status: DISCONTINUED | OUTPATIENT
Start: 2020-12-25 | End: 2021-01-02

## 2020-12-25 RX ORDER — LIDOCAINE 50 MG/G
1 PATCH TOPICAL EVERY 24 HOURS
Status: DISCONTINUED | OUTPATIENT
Start: 2020-12-25 | End: 2021-01-09 | Stop reason: HOSPADM

## 2020-12-25 RX ORDER — POTASSIUM CHLORIDE 20 MEQ/1
40 TABLET, EXTENDED RELEASE ORAL ONCE
Status: COMPLETED | OUTPATIENT
Start: 2020-12-25 | End: 2020-12-25

## 2020-12-25 RX ADMIN — INSULIN HUMAN 3 UNITS: 100 INJECTION, SOLUTION PARENTERAL at 15:57

## 2020-12-25 RX ADMIN — DOXYCYCLINE 100 MG: 100 TABLET, FILM COATED ORAL at 17:16

## 2020-12-25 RX ADMIN — INSULIN GLARGINE 11 UNITS: 100 INJECTION, SOLUTION SUBCUTANEOUS at 17:16

## 2020-12-25 RX ADMIN — PIPERACILLIN AND TAZOBACTAM 4.5 G: 4; .5 INJECTION, POWDER, LYOPHILIZED, FOR SOLUTION INTRAVENOUS; PARENTERAL at 12:53

## 2020-12-25 RX ADMIN — LIDOCAINE 1 PATCH: 50 PATCH TOPICAL at 10:59

## 2020-12-25 RX ADMIN — IBUPROFEN 400 MG: 800 TABLET, FILM COATED ORAL at 18:00

## 2020-12-25 RX ADMIN — IBUPROFEN 400 MG: 800 TABLET, FILM COATED ORAL at 12:29

## 2020-12-25 RX ADMIN — DOXYCYCLINE 100 MG: 100 TABLET, FILM COATED ORAL at 05:18

## 2020-12-25 RX ADMIN — OXYCODONE HYDROCHLORIDE 5 MG: 5 TABLET ORAL at 22:24

## 2020-12-25 RX ADMIN — PIPERACILLIN AND TAZOBACTAM 4.5 G: 4; .5 INJECTION, POWDER, LYOPHILIZED, FOR SOLUTION INTRAVENOUS; PARENTERAL at 05:14

## 2020-12-25 RX ADMIN — PIPERACILLIN AND TAZOBACTAM 4.5 G: 4; .5 INJECTION, POWDER, LYOPHILIZED, FOR SOLUTION INTRAVENOUS; PARENTERAL at 22:24

## 2020-12-25 RX ADMIN — ESCITALOPRAM OXALATE 10 MG: 10 TABLET ORAL at 05:19

## 2020-12-25 RX ADMIN — ENOXAPARIN SODIUM 40 MG: 40 INJECTION SUBCUTANEOUS at 05:18

## 2020-12-25 RX ADMIN — GABAPENTIN 400 MG: 400 CAPSULE ORAL at 05:18

## 2020-12-25 RX ADMIN — POTASSIUM CHLORIDE 40 MEQ: 1500 TABLET, EXTENDED RELEASE ORAL at 12:30

## 2020-12-25 RX ADMIN — GABAPENTIN 400 MG: 400 CAPSULE ORAL at 17:16

## 2020-12-25 RX ADMIN — OMEPRAZOLE 20 MG: 20 CAPSULE, DELAYED RELEASE ORAL at 05:19

## 2020-12-25 RX ADMIN — INSULIN HUMAN 3 UNITS: 100 INJECTION, SOLUTION PARENTERAL at 11:05

## 2020-12-25 ASSESSMENT — ENCOUNTER SYMPTOMS
SORE THROAT: 0
FEVER: 0
HEADACHES: 0
SHORTNESS OF BREATH: 1
DIZZINESS: 0
NERVOUS/ANXIOUS: 1
ABDOMINAL PAIN: 0
COUGH: 0
PALPITATIONS: 0
NAUSEA: 0

## 2020-12-25 NOTE — PROGRESS NOTES
SAO2 ran 88-94 most of night at rest. Pt attempted to sit side of bed this morning- SAO2 down to 35%-40% immediately and took 15 min to recover to 85%

## 2020-12-25 NOTE — PROGRESS NOTES
Hospital Medicine Daily Progress Note    Date of Service  12/25/2020    Chief Complaint  Short of breath with diarrhea, vomiting x 2 weeks.    Hospital Course  Tajik speaking 55 y.o. male with DMII, neuropathy, admitted 12/8/2020 with COVID-19 infection requiring ICU hospitalization due to O2 demand.    Interval Problem Update  12/15: On high flow NC (60L at 100%). On day 7 of 10 Decadron.  Tolerating diet.     12/16: Alert but ongoing respiratory distress.  Acute worsening this am with sinus tachycardia on telemetry and worsening oxygen saturation.  An ABG was requested and showed pH:7.46/pCO2:34.5/pO2:48.  I alerted Dr Mayo for possible need of bipap or intubation.    12/17: Still tenuous from a respiratory standpoint.  He is anxious at times and a bit scared.  Used  via iPad to communicate with him this morning.  He remains on 90% FiO2 with 60 L O2.    12/18: On 60L 100% and rapid shallow breathing.  Tearful at times.  Denied pain.  Able to talk in short sentences.  Discussed with Dr Mayo     12/19: Remains on high amounts of oxygen and very weak.  He states he is scared and nervous.  He remains on 60L and 100% via HFNC. I have called his sister and updated her on her brother's status.    12/20 Less respiratory distress.  Watching TV.  Some episodes on anxiety and RN states the antivan the other day helped patient.  Eating breakfast.    12/21 Looks better today but still very weak.  Oxygen now down to 50L and 50% via HFNC.  Awake and eating breakfast.  Anxious at times.  Watches TV.    12/22: Patient seen and examined by me today.  He was complaining about chest pain but EKG found Q waves in inferior leads but no significant ST segment changes.  He remains on 50 L, 50% FiO2 of high flow nasal cannula.  Continue incentive spirometer and ambulation to wean off oxygen.  In addition, we will start Lantus to help control blood glucose better.    12/23: Patient continues to complain of chest pain..  ND  depressions, ST elevations on EKG indicating pericarditis.  Limited cardiac echo ordered to evaluate for pericardial effusion.  In addition I started him on ibuprofen and Cardizem.  Patient did have increasing oxygen requirement today.    12/24: Overnight patient found to be septic protocol was started.  So far cultures have remained negative.  Likely source is a left-sided pneumonia found on chest x-ray.  This is likely cause of his pleuritic chest pain as well.  IV Unasyn and doxycycline has been started.  Since patient still spiking fevers and has lactic acidosis I will transition him to IV Zosyn.    12/25: Patient continues to complain of left-sided pleuritic pain.  Started lidocaine patch and prescribed ibuprofen.  He is taking shallow breaths and chest x-ray found bilateral atelectasis.  Encouraged incentive spirometry and ambulation.  He is having increased leukocytosis will continue IV antibiotics for now.  Cultures are negative to date.  Consultants/Specialty  Pulmonary    Code Status  Full Code    Disposition  Transfer to [facility] floor.    Review of Systems  Review of Systems   Constitutional: Positive for malaise/fatigue. Negative for fever.   HENT: Negative for sore throat.    Respiratory: Positive for shortness of breath. Negative for cough.    Cardiovascular: Negative for chest pain, palpitations and leg swelling.   Gastrointestinal: Negative for abdominal pain and nausea.   Genitourinary: Negative for dysuria.   Neurological: Negative for dizziness and headaches.   Psychiatric/Behavioral: The patient is nervous/anxious.         Physical Exam  Temp:  [36.4 °C (97.6 °F)-37.8 °C (100 °F)] 36.4 °C (97.6 °F)  Pulse:  [] 111  Resp:  [4-56] 46  BP: ()/() 146/118  SpO2:  [86 %-100 %] 99 %    Physical Exam  Vitals signs reviewed.   Constitutional:       General: He is in acute distress.      Appearance: He is ill-appearing. He is not diaphoretic.   HENT:      Head: Normocephalic and atraumatic.       Nose: Nose normal.      Mouth/Throat:      Mouth: Mucous membranes are dry.      Pharynx: No oropharyngeal exudate.   Eyes:      General:         Right eye: No discharge.         Left eye: No discharge.      Extraocular Movements: Extraocular movements intact.      Conjunctiva/sclera:      Right eye: Hemorrhage present.      Left eye: Hemorrhage present.   Neck:      Musculoskeletal: Neck supple.   Cardiovascular:      Rate and Rhythm: Regular rhythm. Tachycardia present.      Pulses:           Radial pulses are 2+ on the right side and 2+ on the left side.        Dorsalis pedis pulses are 2+ on the right side and 2+ on the left side.      Heart sounds: No murmur.   Pulmonary:      Effort: Tachypnea and respiratory distress present.      Breath sounds: Rhonchi present. No wheezing.   Abdominal:      General: Bowel sounds are normal. There is no distension.      Palpations: Abdomen is soft.      Tenderness: There is no abdominal tenderness.   Musculoskeletal:         General: No swelling or tenderness.      Right lower leg: No edema.      Left lower leg: No edema.   Skin:     Coloration: Skin is not jaundiced.      Findings: No bruising.   Neurological:      General: No focal deficit present.      Mental Status: He is alert and oriented to person, place, and time. Mental status is at baseline.      Cranial Nerves: No cranial nerve deficit.   Psychiatric:         Mood and Affect: Mood normal.         Fluids    Intake/Output Summary (Last 24 hours) at 12/25/2020 1245  Last data filed at 12/25/2020 1200  Gross per 24 hour   Intake 2827.91 ml   Output 1550 ml   Net 1277.91 ml       Laboratory  Recent Labs     12/23/20  0640 12/24/20  0500 12/25/20  0430   WBC 13.6* 13.9* 14.1*   RBC 4.59* 4.64* 3.79*   HEMOGLOBIN 13.8* 14.0 11.3*   HEMATOCRIT 42.6 43.2 35.4*   MCV 92.8 93.1 93.4   MCH 30.1 30.2 29.8   MCHC 32.4* 32.4* 31.9*   RDW 43.8 44.5 44.7   PLATELETCT 272 289 261   MPV 9.7 10.2 9.7     Recent Labs      12/23/20  0640 12/24/20  0445 12/25/20  0430   SODIUM 127* 135 130*   POTASSIUM 4.4 4.4 3.9   CHLORIDE 89* 97 95*   CO2 32 32 27   GLUCOSE 123* 118* 112*   BUN 15 14 9   CREATININE 0.77 0.73 0.72   CALCIUM 9.1 8.9 8.6                   Imaging  EC-ECHOCARDIOGRAM LTD W/O CONT   Final Result      DX-CHEST-PORTABLE (1 VIEW)   Final Result      Persistent right greater left, consolidation consistent with Covid 19 pneumonia.      US-EXTREMITY VENOUS LOWER BILAT   Final Result      DX-CHEST-PORTABLE (1 VIEW)   Final Result      No significant change in Covid pneumonia.      US-EXTREMITY VENOUS LOWER BILAT   Final Result      EC-ECHOCARDIOGRAM LTD W/O CONT   Final Result      DX-CHEST-LIMITED (1 VIEW)   Final Result      No significant change in bilateral pneumonia.      DX-CHEST-LIMITED (1 VIEW)   Final Result         Persistent bilateral pulmonary opacities consistent with Covid 19 pneumonia.      DX-CHEST-PORTABLE (1 VIEW)   Final Result      Bilateral peripheral pulmonary airspace process highly suspicious for Covid pneumonia           Assessment/Plan  * Acute hypoxemic respiratory failure (HCC)- (present on admission)  Assessment & Plan  Likely  secondary to Covid pneumonia superimposed to the patient with pneumonia  Status post course of 10 days Decadron  Encourage incentive spirometer, self proning, titrate oxygen as needed  COVID + as of 12/8    Leukocytosis  Assessment & Plan  12/21 WBC: 18.1  12/20 WBC:21.8  12/19 WBC:22  12/17 WBC:17.6  monitor vitals.  Continue to monitor    Pneumonia due to COVID-19 virus  Assessment & Plan  Tested COVID-19 positive on 12/8  Patient is on high flow,  S/p Decadron 6 mg p.o. daily until 12/17  S/p 5 days course of remdesivir with last dose 12/13.  continue incentive spirometer, provide Zn, vitamin D and ascorbic acid  Encourage proning and mobilization  Patient was treated with empiric IV antibiotics.  WBC and procalcitonin have been downtrending  Ultrasound negative for DVT in  bilateral lower extremities, echocardiogram reveals normal LVEF    Anxiety  Assessment & Plan  Ordered Xanax 0.25mg TIDprn   On escitalopram 10mg daily    Elevated d-dimer- (present on admission)  Assessment & Plan  12/16 LE US negative for DVT  Echo from 12/11 reviewed with no right heart failure.    Abnormal LFTs  Assessment & Plan  Suspect due to covid 19   Monitoring CMP (last checked 12/13)  12/17 LFTs showing improvement.  Likely viral etiology    Aspiration pneumonia (HCC)  Assessment & Plan  Found to have left sided infiltrate  Bronchial breath sounds and egophony on the left lung exam   Started on HCap antibiotics including (Zosyn and doxycyline)   Follow up sputum and blood cultures  Respiratory care protocol   Placed on o2 therapy       Type 2 diabetes mellitus with hyperglycemia, without long-term current use of insulin ([facility])  Assessment & Plan  Glyco at 7.2, continue hypoglycemia protocol, accu-checks AC and HS    -- diabetic diet  Start lantus 11U for better BG control     Sepsis ([facility])  Assessment & Plan  This is Severe Sepsis Present on admission  SIRS criteria identified on my evaluation include: Tachycardia, with heart rate greater than 90 BPM, Tachypnea, with respirations greater than 20 per minute and Leukocytosis, with WBC greater than 12,000  Source of infection is pulmonary  Clinical indicators of end organ dysfunction include Lactate >2 mmol/L (18.0 mg/dL)  Sepsis protocol initiated  Fluid resuscitation ordered per protocol  IV antibiotics as appropriate for source of sepsis  Reassessment: I have reassessed the patient's hemodynamic status  End organ dysfunction include(s):  Acute respiratory failure     Neuropathic pain- (present on admission)  Assessment & Plan  gabapentin       VTE prophylaxis: Enoxaparin 40mg daily

## 2020-12-26 LAB
ANION GAP SERPL CALC-SCNC: 10 MMOL/L (ref 7–16)
BUN SERPL-MCNC: 13 MG/DL (ref 8–22)
CALCIUM SERPL-MCNC: 8.7 MG/DL (ref 8.5–10.5)
CHLORIDE SERPL-SCNC: 103 MMOL/L (ref 96–112)
CO2 SERPL-SCNC: 24 MMOL/L (ref 20–33)
CREAT SERPL-MCNC: 0.7 MG/DL (ref 0.5–1.4)
ERYTHROCYTE [DISTWIDTH] IN BLOOD BY AUTOMATED COUNT: 46.8 FL (ref 35.9–50)
GLUCOSE BLD-MCNC: 108 MG/DL (ref 65–99)
GLUCOSE BLD-MCNC: 161 MG/DL (ref 65–99)
GLUCOSE BLD-MCNC: 182 MG/DL (ref 65–99)
GLUCOSE BLD-MCNC: 193 MG/DL (ref 65–99)
GLUCOSE SERPL-MCNC: 122 MG/DL (ref 65–99)
HCT VFR BLD AUTO: 32.9 % (ref 42–52)
HGB BLD-MCNC: 10.4 G/DL (ref 14–18)
MCH RBC QN AUTO: 29.4 PG (ref 27–33)
MCHC RBC AUTO-ENTMCNC: 31.6 G/DL (ref 33.7–35.3)
MCV RBC AUTO: 92.9 FL (ref 81.4–97.8)
PLATELET # BLD AUTO: 298 K/UL (ref 164–446)
PMV BLD AUTO: 9.6 FL (ref 9–12.9)
POTASSIUM SERPL-SCNC: 4.2 MMOL/L (ref 3.6–5.5)
PROCALCITONIN SERPL-MCNC: 0.41 NG/ML
RBC # BLD AUTO: 3.54 M/UL (ref 4.7–6.1)
SODIUM SERPL-SCNC: 137 MMOL/L (ref 135–145)
WBC # BLD AUTO: 15 K/UL (ref 4.8–10.8)

## 2020-12-26 PROCEDURE — 700102 HCHG RX REV CODE 250 W/ 637 OVERRIDE(OP): Performed by: HOSPITALIST

## 2020-12-26 PROCEDURE — 80048 BASIC METABOLIC PNL TOTAL CA: CPT

## 2020-12-26 PROCEDURE — A9270 NON-COVERED ITEM OR SERVICE: HCPCS | Performed by: INTERNAL MEDICINE

## 2020-12-26 PROCEDURE — 94640 AIRWAY INHALATION TREATMENT: CPT

## 2020-12-26 PROCEDURE — A9270 NON-COVERED ITEM OR SERVICE: HCPCS | Performed by: HOSPITALIST

## 2020-12-26 PROCEDURE — 99232 SBSQ HOSP IP/OBS MODERATE 35: CPT | Performed by: HOSPITALIST

## 2020-12-26 PROCEDURE — 700105 HCHG RX REV CODE 258: Performed by: HOSPITALIST

## 2020-12-26 PROCEDURE — 82962 GLUCOSE BLOOD TEST: CPT

## 2020-12-26 PROCEDURE — 85027 COMPLETE CBC AUTOMATED: CPT

## 2020-12-26 PROCEDURE — 770020 HCHG ROOM/CARE - TELE (206)

## 2020-12-26 PROCEDURE — 84145 PROCALCITONIN (PCT): CPT

## 2020-12-26 PROCEDURE — 700102 HCHG RX REV CODE 250 W/ 637 OVERRIDE(OP): Performed by: INTERNAL MEDICINE

## 2020-12-26 PROCEDURE — 700111 HCHG RX REV CODE 636 W/ 250 OVERRIDE (IP): Performed by: EMERGENCY MEDICINE

## 2020-12-26 PROCEDURE — 700101 HCHG RX REV CODE 250: Performed by: HOSPITALIST

## 2020-12-26 PROCEDURE — 700111 HCHG RX REV CODE 636 W/ 250 OVERRIDE (IP): Performed by: HOSPITALIST

## 2020-12-26 RX ADMIN — DOXYCYCLINE 100 MG: 100 TABLET, FILM COATED ORAL at 06:02

## 2020-12-26 RX ADMIN — ESCITALOPRAM OXALATE 10 MG: 10 TABLET ORAL at 06:02

## 2020-12-26 RX ADMIN — ALPRAZOLAM 0.25 MG: 0.25 TABLET ORAL at 20:47

## 2020-12-26 RX ADMIN — INSULIN HUMAN 3 UNITS: 100 INJECTION, SOLUTION PARENTERAL at 16:05

## 2020-12-26 RX ADMIN — IBUPROFEN 400 MG: 800 TABLET, FILM COATED ORAL at 17:07

## 2020-12-26 RX ADMIN — IBUPROFEN 400 MG: 800 TABLET, FILM COATED ORAL at 12:03

## 2020-12-26 RX ADMIN — PIPERACILLIN AND TAZOBACTAM 4.5 G: 4; .5 INJECTION, POWDER, LYOPHILIZED, FOR SOLUTION INTRAVENOUS; PARENTERAL at 12:04

## 2020-12-26 RX ADMIN — OXYCODONE HYDROCHLORIDE 5 MG: 5 TABLET ORAL at 20:46

## 2020-12-26 RX ADMIN — IBUPROFEN 400 MG: 800 TABLET, FILM COATED ORAL at 09:26

## 2020-12-26 RX ADMIN — PIPERACILLIN AND TAZOBACTAM 4.5 G: 4; .5 INJECTION, POWDER, LYOPHILIZED, FOR SOLUTION INTRAVENOUS; PARENTERAL at 20:46

## 2020-12-26 RX ADMIN — INSULIN GLARGINE 11 UNITS: 100 INJECTION, SOLUTION SUBCUTANEOUS at 17:07

## 2020-12-26 RX ADMIN — LIDOCAINE 1 PATCH: 50 PATCH TOPICAL at 10:27

## 2020-12-26 RX ADMIN — INSULIN HUMAN 3 UNITS: 100 INJECTION, SOLUTION PARENTERAL at 10:53

## 2020-12-26 RX ADMIN — GABAPENTIN 400 MG: 400 CAPSULE ORAL at 17:07

## 2020-12-26 RX ADMIN — OMEPRAZOLE 20 MG: 20 CAPSULE, DELAYED RELEASE ORAL at 06:02

## 2020-12-26 RX ADMIN — ENOXAPARIN SODIUM 40 MG: 40 INJECTION SUBCUTANEOUS at 06:02

## 2020-12-26 RX ADMIN — DOXYCYCLINE 100 MG: 100 TABLET, FILM COATED ORAL at 17:07

## 2020-12-26 RX ADMIN — GABAPENTIN 400 MG: 400 CAPSULE ORAL at 06:02

## 2020-12-26 RX ADMIN — PIPERACILLIN AND TAZOBACTAM 4.5 G: 4; .5 INJECTION, POWDER, LYOPHILIZED, FOR SOLUTION INTRAVENOUS; PARENTERAL at 06:03

## 2020-12-26 RX ADMIN — INSULIN HUMAN 3 UNITS: 100 INJECTION, SOLUTION PARENTERAL at 20:56

## 2020-12-26 ASSESSMENT — PAIN DESCRIPTION - PAIN TYPE
TYPE: ACUTE PAIN

## 2020-12-26 ASSESSMENT — ENCOUNTER SYMPTOMS
DIZZINESS: 0
COUGH: 0
NAUSEA: 0
SORE THROAT: 0
PALPITATIONS: 0
ABDOMINAL PAIN: 0
SHORTNESS OF BREATH: 1
HEADACHES: 0
FEVER: 0
NERVOUS/ANXIOUS: 1

## 2020-12-26 ASSESSMENT — PAIN SCALES - WONG BAKER: WONGBAKER_NUMERICALRESPONSE: DOESN'T HURT AT ALL

## 2020-12-26 NOTE — PROGRESS NOTES
Hospital Medicine Daily Progress Note    Date of Service  12/26/2020    Chief Complaint  Short of breath with diarrhea, vomiting x 2 weeks.    Hospital Course  Georgian speaking 55 y.o. male with DMII, neuropathy, admitted 12/8/2020 with COVID-19 infection requiring ICU hospitalization due to O2 demand.    Interval Problem Update  12/15: On high flow NC (60L at 100%). On day 7 of 10 Decadron.  Tolerating diet.     12/16: Alert but ongoing respiratory distress.  Acute worsening this am with sinus tachycardia on telemetry and worsening oxygen saturation.  An ABG was requested and showed pH:7.46/pCO2:34.5/pO2:48.  I alerted Dr Mayo for possible need of bipap or intubation.    12/17: Still tenuous from a respiratory standpoint.  He is anxious at times and a bit scared.  Used  via iPad to communicate with him this morning.  He remains on 90% FiO2 with 60 L O2.    12/18: On 60L 100% and rapid shallow breathing.  Tearful at times.  Denied pain.  Able to talk in short sentences.  Discussed with Dr Mayo     12/19: Remains on high amounts of oxygen and very weak.  He states he is scared and nervous.  He remains on 60L and 100% via HFNC. I have called his sister and updated her on her brother's status.    12/20 Less respiratory distress.  Watching TV.  Some episodes on anxiety and RN states the antivan the other day helped patient.  Eating breakfast.    12/21 Looks better today but still very weak.  Oxygen now down to 50L and 50% via HFNC.  Awake and eating breakfast.  Anxious at times.  Watches TV.    12/22: Patient seen and examined by me today.  He was complaining about chest pain but EKG found Q waves in inferior leads but no significant ST segment changes.  He remains on 50 L, 50% FiO2 of high flow nasal cannula.  Continue incentive spirometer and ambulation to wean off oxygen.  In addition, we will start Lantus to help control blood glucose better.    12/23: Patient continues to complain of chest pain..  [place]  depressions, ST elevations on EKG indicating pericarditis.  Limited cardiac echo ordered to evaluate for pericardial effusion.  In addition I started him on ibuprofen and Cardizem.  Patient did have increasing oxygen requirement today.    12/24: Overnight patient found to be septic protocol was started.  So far cultures have remained negative.  Likely source is a left-sided pneumonia found on chest x-ray.  This is likely cause of his pleuritic chest pain as well.  IV Unasyn and doxycycline has been started.  Since patient still spiking fevers and has lactic acidosis I will transition him to IV Zosyn.    12/25: Patient continues to complain of left-sided pleuritic pain.  Started lidocaine patch and prescribed ibuprofen.  He is taking shallow breaths and chest x-ray found bilateral atelectasis.  Encouraged incentive spirometry and ambulation.  He is having increased leukocytosis will continue IV antibiotics for now.  Cultures are negative to date.    12/26: Patient does have increased leukocytosis.  We will continue to trend this and get a procalcitonin level.  Patient states that his pain is much better controlled but he still taking shallow breaths.  I prescribed him hydrocodone cough syrup.  Blood cultures remain negative to date.  Consultants/Specialty  Pulmonary    Code Status  Full Code    Disposition  Transfer to [facility] floor.    Review of Systems  Review of Systems   Constitutional: Positive for malaise/fatigue. Negative for fever.   HENT: Negative for sore throat.    Respiratory: Positive for shortness of breath. Negative for cough.    Cardiovascular: Negative for chest pain, palpitations and leg swelling.   Gastrointestinal: Negative for abdominal pain and nausea.   Genitourinary: Negative for dysuria.   Neurological: Negative for dizziness and headaches.   Psychiatric/Behavioral: The patient is nervous/anxious.         Physical Exam  Temp:  [36.7 °C (98.1 °F)-37.1 °C (98.8 °F)] 37.1 °C (98.8 °F)  Pulse:   [] 98  Resp:  [14-56] 43  BP: ()/() 114/81  SpO2:  [92 %-99 %] 94 %    Physical Exam  Vitals signs reviewed.   Constitutional:       General: He is in acute distress.      Appearance: He is ill-appearing. He is not diaphoretic.   HENT:      Head: Normocephalic and atraumatic.      Nose: Nose normal.      Mouth/Throat:      Mouth: Mucous membranes are dry.      Pharynx: No oropharyngeal exudate.   Eyes:      General:         Right eye: No discharge.         Left eye: No discharge.      Extraocular Movements: Extraocular movements intact.      Conjunctiva/sclera:      Right eye: Hemorrhage present.      Left eye: Hemorrhage present.   Neck:      Musculoskeletal: Neck supple.   Cardiovascular:      Rate and Rhythm: Regular rhythm. Tachycardia present.      Pulses:           Radial pulses are 2+ on the right side and 2+ on the left side.        Dorsalis pedis pulses are 2+ on the right side and 2+ on the left side.      Heart sounds: No murmur.   Pulmonary:      Effort: Tachypnea and respiratory distress present.      Breath sounds: Rhonchi present. No wheezing.   Abdominal:      General: Bowel sounds are normal. There is no distension.      Palpations: Abdomen is soft.      Tenderness: There is no abdominal tenderness.   Musculoskeletal:         General: No swelling or tenderness.      Right lower leg: No edema.      Left lower leg: No edema.   Skin:     Coloration: Skin is not jaundiced.      Findings: No bruising.   Neurological:      General: No focal deficit present.      Mental Status: He is alert and oriented to person, place, and time. Mental status is at baseline.      Cranial Nerves: No cranial nerve deficit.   Psychiatric:         Mood and Affect: Mood normal.         Fluids    Intake/Output Summary (Last 24 hours) at 12/26/2020 1306  Last data filed at 12/26/2020 1100  Gross per 24 hour   Intake 2588.33 ml   Output 1250 ml   Net 1338.33 ml       Laboratory  Recent Labs     12/24/20  0500  12/25/20 0430 12/26/20 0445   WBC 13.9* 14.1* 15.0*   RBC 4.64* 3.79* 3.54*   HEMOGLOBIN 14.0 11.3* 10.4*   HEMATOCRIT 43.2 35.4* 32.9*   MCV 93.1 93.4 92.9   MCH 30.2 29.8 29.4   MCHC 32.4* 31.9* 31.6*   RDW 44.5 44.7 46.8   PLATELETCT 289 261 298   MPV 10.2 9.7 9.6     Recent Labs     12/24/20 0445 12/25/20 0430 12/26/20 0445   SODIUM 135 130* 137   POTASSIUM 4.4 3.9 4.2   CHLORIDE 97 95* 103   CO2 32 27 24   GLUCOSE 118* 112* 122*   BUN 14 9 13   CREATININE 0.73 0.72 0.70   CALCIUM 8.9 8.6 8.7                   Imaging  EC-ECHOCARDIOGRAM LTD W/O CONT   Final Result      DX-CHEST-PORTABLE (1 VIEW)   Final Result      Persistent right greater left, consolidation consistent with Covid 19 pneumonia.      US-EXTREMITY VENOUS LOWER BILAT   Final Result      DX-CHEST-PORTABLE (1 VIEW)   Final Result      No significant change in Covid pneumonia.      US-EXTREMITY VENOUS LOWER BILAT   Final Result      EC-ECHOCARDIOGRAM LTD W/O CONT   Final Result      DX-CHEST-LIMITED (1 VIEW)   Final Result      No significant change in bilateral pneumonia.      DX-CHEST-LIMITED (1 VIEW)   Final Result         Persistent bilateral pulmonary opacities consistent with Covid 19 pneumonia.      DX-CHEST-PORTABLE (1 VIEW)   Final Result      Bilateral peripheral pulmonary airspace process highly suspicious for Covid pneumonia           Assessment/Plan  * Acute hypoxemic respiratory failure (HCC)- (present on admission)  Assessment & Plan  Likely  secondary to Covid pneumonia superimposed to the patient with pneumonia  Status post course of 10 days Decadron  Encourage incentive spirometer, self proning, titrate oxygen as needed  COVID + as of 12/8    Leukocytosis  Assessment & Plan  12/21 WBC: 18.1  12/20 WBC:21.8  12/19 WBC:22  12/17 WBC:17.6  monitor vitals.  Continue to monitor    Pneumonia due to COVID-19 virus  Assessment & Plan  Tested COVID-19 positive on 12/8  Patient is on high flow,  S/p Decadron 6 mg p.o. daily until  12/17  S/p 5 days course of remdesivir with last dose 12/13.  continue incentive spirometer, provide Zn, vitamin D and ascorbic acid  Encourage proning and mobilization  Patient was treated with empiric IV antibiotics.  WBC and procalcitonin have been downtrending  Ultrasound negative for DVT in bilateral lower extremities, echocardiogram reveals normal LVEF    Anxiety  Assessment & Plan  Ordered Xanax 0.25mg TIDprn   On escitalopram 10mg daily    Elevated d-dimer- (present on admission)  Assessment & Plan  12/16 LE US negative for DVT  Echo from 12/11 reviewed with no right heart failure.    Abnormal LFTs  Assessment & Plan  Suspect due to covid 19   Monitoring CMP (last checked 12/13)  12/17 LFTs showing improvement.  Likely viral etiology    Aspiration pneumonia (HCC)  Assessment & Plan  Found to have left sided infiltrate  Bronchial breath sounds and egophony on the left lung exam   Started on HCap antibiotics including (Zosyn and doxycyline)   Follow up sputum and blood cultures  Respiratory care protocol   Placed on o2 therapy       Type 2 diabetes mellitus with hyperglycemia, without long-term current use of insulin (HCC)  Assessment & Plan  Glyco at 7.2, continue hypoglycemia protocol, accu-checks AC and HS    -- diabetic diet  Start lantus 11U for better BG control     Sepsis (HCC)  Assessment & Plan  This is Severe Sepsis Present on admission  SIRS criteria identified on my evaluation include: Tachycardia, with heart rate greater than 90 BPM, Tachypnea, with respirations greater than 20 per minute and Leukocytosis, with WBC greater than 12,000  Source of infection is pulmonary  Clinical indicators of end organ dysfunction include Lactate >2 mmol/L (18.0 mg/dL)  Sepsis protocol initiated  Fluid resuscitation ordered per protocol  IV antibiotics as appropriate for source of sepsis  Reassessment: I have reassessed the patient's hemodynamic status  End organ dysfunction include(s):  Acute respiratory failure      Neuropathic pain- (present on admission)  Assessment & Plan  gabapentin       VTE prophylaxis: Enoxaparin 40mg daily

## 2020-12-27 LAB
ANION GAP SERPL CALC-SCNC: 10 MMOL/L (ref 7–16)
BUN SERPL-MCNC: 11 MG/DL (ref 8–22)
CALCIUM SERPL-MCNC: 8.4 MG/DL (ref 8.5–10.5)
CHLORIDE SERPL-SCNC: 98 MMOL/L (ref 96–112)
CO2 SERPL-SCNC: 28 MMOL/L (ref 20–33)
CREAT SERPL-MCNC: 0.65 MG/DL (ref 0.5–1.4)
ERYTHROCYTE [DISTWIDTH] IN BLOOD BY AUTOMATED COUNT: 47.2 FL (ref 35.9–50)
GLUCOSE BLD-MCNC: 111 MG/DL (ref 65–99)
GLUCOSE BLD-MCNC: 131 MG/DL (ref 65–99)
GLUCOSE BLD-MCNC: 137 MG/DL (ref 65–99)
GLUCOSE BLD-MCNC: 159 MG/DL (ref 65–99)
GLUCOSE SERPL-MCNC: 111 MG/DL (ref 65–99)
HCT VFR BLD AUTO: 36.6 % (ref 42–52)
HGB BLD-MCNC: 11.9 G/DL (ref 14–18)
MCH RBC QN AUTO: 30.8 PG (ref 27–33)
MCHC RBC AUTO-ENTMCNC: 32.5 G/DL (ref 33.7–35.3)
MCV RBC AUTO: 94.8 FL (ref 81.4–97.8)
PLATELET # BLD AUTO: 299 K/UL (ref 164–446)
PMV BLD AUTO: 9.4 FL (ref 9–12.9)
POTASSIUM SERPL-SCNC: 5.2 MMOL/L (ref 3.6–5.5)
RBC # BLD AUTO: 3.86 M/UL (ref 4.7–6.1)
SODIUM SERPL-SCNC: 136 MMOL/L (ref 135–145)
WBC # BLD AUTO: 14.2 K/UL (ref 4.8–10.8)

## 2020-12-27 PROCEDURE — 700102 HCHG RX REV CODE 250 W/ 637 OVERRIDE(OP): Performed by: HOSPITALIST

## 2020-12-27 PROCEDURE — 700101 HCHG RX REV CODE 250: Performed by: HOSPITALIST

## 2020-12-27 PROCEDURE — 82962 GLUCOSE BLOOD TEST: CPT

## 2020-12-27 PROCEDURE — A9270 NON-COVERED ITEM OR SERVICE: HCPCS | Performed by: HOSPITALIST

## 2020-12-27 PROCEDURE — 94640 AIRWAY INHALATION TREATMENT: CPT

## 2020-12-27 PROCEDURE — 700102 HCHG RX REV CODE 250 W/ 637 OVERRIDE(OP): Performed by: INTERNAL MEDICINE

## 2020-12-27 PROCEDURE — 770020 HCHG ROOM/CARE - TELE (206)

## 2020-12-27 PROCEDURE — 700105 HCHG RX REV CODE 258: Performed by: HOSPITALIST

## 2020-12-27 PROCEDURE — A9270 NON-COVERED ITEM OR SERVICE: HCPCS | Performed by: INTERNAL MEDICINE

## 2020-12-27 PROCEDURE — 307059 PAD,EAR PROTECTOR: Performed by: HOSPITALIST

## 2020-12-27 PROCEDURE — 99232 SBSQ HOSP IP/OBS MODERATE 35: CPT | Performed by: HOSPITALIST

## 2020-12-27 PROCEDURE — 85027 COMPLETE CBC AUTOMATED: CPT

## 2020-12-27 PROCEDURE — 700111 HCHG RX REV CODE 636 W/ 250 OVERRIDE (IP): Performed by: HOSPITALIST

## 2020-12-27 PROCEDURE — 80048 BASIC METABOLIC PNL TOTAL CA: CPT

## 2020-12-27 PROCEDURE — 700111 HCHG RX REV CODE 636 W/ 250 OVERRIDE (IP): Performed by: EMERGENCY MEDICINE

## 2020-12-27 RX ORDER — ECHINACEA PURPUREA EXTRACT 125 MG
2 TABLET ORAL
Status: DISCONTINUED | OUTPATIENT
Start: 2020-12-27 | End: 2021-01-09 | Stop reason: HOSPADM

## 2020-12-27 RX ADMIN — ENOXAPARIN SODIUM 40 MG: 40 INJECTION SUBCUTANEOUS at 05:42

## 2020-12-27 RX ADMIN — OXYCODONE HYDROCHLORIDE 5 MG: 5 TABLET ORAL at 21:42

## 2020-12-27 RX ADMIN — DOXYCYCLINE 100 MG: 100 TABLET, FILM COATED ORAL at 17:55

## 2020-12-27 RX ADMIN — GABAPENTIN 400 MG: 400 CAPSULE ORAL at 17:55

## 2020-12-27 RX ADMIN — IBUPROFEN 400 MG: 800 TABLET, FILM COATED ORAL at 17:52

## 2020-12-27 RX ADMIN — IBUPROFEN 400 MG: 800 TABLET, FILM COATED ORAL at 05:42

## 2020-12-27 RX ADMIN — LIDOCAINE 1 PATCH: 50 PATCH TOPICAL at 12:10

## 2020-12-27 RX ADMIN — IBUPROFEN 400 MG: 800 TABLET, FILM COATED ORAL at 12:09

## 2020-12-27 RX ADMIN — ALPRAZOLAM 0.25 MG: 0.25 TABLET ORAL at 21:42

## 2020-12-27 RX ADMIN — PIPERACILLIN AND TAZOBACTAM 4.5 G: 4; .5 INJECTION, POWDER, LYOPHILIZED, FOR SOLUTION INTRAVENOUS; PARENTERAL at 21:43

## 2020-12-27 RX ADMIN — PIPERACILLIN AND TAZOBACTAM 4.5 G: 4; .5 INJECTION, POWDER, LYOPHILIZED, FOR SOLUTION INTRAVENOUS; PARENTERAL at 05:41

## 2020-12-27 RX ADMIN — INSULIN GLARGINE 11 UNITS: 100 INJECTION, SOLUTION SUBCUTANEOUS at 17:52

## 2020-12-27 RX ADMIN — SALINE NASAL SPRAY 2 SPRAY: 1.5 SOLUTION NASAL at 17:52

## 2020-12-27 RX ADMIN — OMEPRAZOLE 20 MG: 20 CAPSULE, DELAYED RELEASE ORAL at 05:42

## 2020-12-27 RX ADMIN — PIPERACILLIN AND TAZOBACTAM 4.5 G: 4; .5 INJECTION, POWDER, LYOPHILIZED, FOR SOLUTION INTRAVENOUS; PARENTERAL at 13:44

## 2020-12-27 RX ADMIN — GABAPENTIN 400 MG: 400 CAPSULE ORAL at 05:42

## 2020-12-27 RX ADMIN — INSULIN HUMAN 3 UNITS: 100 INJECTION, SOLUTION PARENTERAL at 17:53

## 2020-12-27 RX ADMIN — ESCITALOPRAM OXALATE 10 MG: 10 TABLET ORAL at 05:42

## 2020-12-27 RX ADMIN — DOXYCYCLINE 100 MG: 100 TABLET, FILM COATED ORAL at 05:42

## 2020-12-27 ASSESSMENT — PAIN DESCRIPTION - PAIN TYPE
TYPE: ACUTE PAIN
TYPE: ACUTE PAIN

## 2020-12-27 ASSESSMENT — ENCOUNTER SYMPTOMS
NERVOUS/ANXIOUS: 1
COUGH: 0
SORE THROAT: 0
DIZZINESS: 0
HEADACHES: 0
PALPITATIONS: 0
NAUSEA: 0
FEVER: 0
ABDOMINAL PAIN: 0
SHORTNESS OF BREATH: 1

## 2020-12-27 ASSESSMENT — PAIN SCALES - WONG BAKER
WONGBAKER_NUMERICALRESPONSE: DOESN'T HURT AT ALL
WONGBAKER_NUMERICALRESPONSE: DOESN'T HURT AT ALL

## 2020-12-27 NOTE — PROGRESS NOTES
Hospital Medicine Daily Progress Note    Date of Service  12/27/2020    Chief Complaint  Short of breath with diarrhea, vomiting x 2 weeks.    Hospital Course  Syriac speaking 55 y.o. male with DMII, neuropathy, admitted 12/8/2020 with COVID-19 infection requiring ICU hospitalization due to O2 demand.    Interval Problem Update  12/15: On high flow NC (60L at 100%). On day 7 of 10 Decadron.  Tolerating diet.     12/16: Alert but ongoing respiratory distress.  Acute worsening this am with sinus tachycardia on telemetry and worsening oxygen saturation.  An ABG was requested and showed pH:7.46/pCO2:34.5/pO2:48.  I alerted Dr Mayo for possible need of bipap or intubation.    12/17: Still tenuous from a respiratory standpoint.  He is anxious at times and a bit scared.  Used  via iPad to communicate with him this morning.  He remains on 90% FiO2 with 60 L O2.    12/18: On 60L 100% and rapid shallow breathing.  Tearful at times.  Denied pain.  Able to talk in short sentences.  Discussed with Dr Mayo     12/19: Remains on high amounts of oxygen and very weak.  He states he is scared and nervous.  He remains on 60L and 100% via HFNC. I have called his sister and updated her on her brother's status.    12/20 Less respiratory distress.  Watching TV.  Some episodes on anxiety and RN states the antivan the other day helped patient.  Eating breakfast.    12/21 Looks better today but still very weak.  Oxygen now down to 50L and 50% via HFNC.  Awake and eating breakfast.  Anxious at times.  Watches TV.    12/22: Patient seen and examined by me today.  He was complaining about chest pain but EKG found Q waves in inferior leads but no significant ST segment changes.  He remains on 50 L, 50% FiO2 of high flow nasal cannula.  Continue incentive spirometer and ambulation to wean off oxygen.  In addition, we will start Lantus to help control blood glucose better.    12/23: Patient continues to complain of chest pain..  [place]  depressions, ST elevations on EKG indicating pericarditis.  Limited cardiac echo ordered to evaluate for pericardial effusion.  In addition I started him on ibuprofen and Cardizem.  Patient did have increasing oxygen requirement today.    12/24: Overnight patient found to be septic protocol was started.  So far cultures have remained negative.  Likely source is a left-sided pneumonia found on chest x-ray.  This is likely cause of his pleuritic chest pain as well.  IV Unasyn and doxycycline has been started.  Since patient still spiking fevers and has lactic acidosis I will transition him to IV Zosyn.    12/25: Patient continues to complain of left-sided pleuritic pain.  Started lidocaine patch and prescribed ibuprofen.  He is taking shallow breaths and chest x-ray found bilateral atelectasis.  Encouraged incentive spirometry and ambulation.  He is having increased leukocytosis will continue IV antibiotics for now.  Cultures are negative to date.    12/26: Patient does have increased leukocytosis.  We will continue to trend this and get a procalcitonin level.  Patient states that his pain is much better controlled but he still taking shallow breaths.  I prescribed him hydrocodone cough syrup.  Blood cultures remain negative to date.    12/27: Improving patient's hypoxia today and was found sitting in chair.  I have encouraged aggressive ISS and ambulation.  Patient states that his pain is improving and I encouraged him to take deep breaths.  Patient's procalcitonin is trending downwards.  Provement in patient's WBC count.  Consultants/Specialty  Pulmonary    Code Status  Full Code    Disposition  Transfer to [facility] floor.    Review of Systems  Review of Systems   Constitutional: Positive for malaise/fatigue. Negative for fever.   HENT: Negative for sore throat.    Respiratory: Positive for shortness of breath. Negative for cough.    Cardiovascular: Negative for chest pain, palpitations and leg swelling.    Gastrointestinal: Negative for abdominal pain and nausea.   Genitourinary: Negative for dysuria.   Neurological: Negative for dizziness and headaches.   Psychiatric/Behavioral: The patient is nervous/anxious.         Physical Exam  Temp:  [36.3 °C (97.3 °F)-37.4 °C (99.3 °F)] 36.6 °C (97.9 °F)  Pulse:  [] 94  Resp:  [19-43] 39  BP: ()/(61-81) 97/63  SpO2:  [94 %-100 %] 98 %    Physical Exam  Vitals signs reviewed.   Constitutional:       General: He is in acute distress.      Appearance: He is ill-appearing. He is not diaphoretic.   HENT:      Head: Normocephalic and atraumatic.      Nose: Nose normal.      Mouth/Throat:      Mouth: Mucous membranes are dry.      Pharynx: No oropharyngeal exudate.   Eyes:      General:         Right eye: No discharge.         Left eye: No discharge.      Extraocular Movements: Extraocular movements intact.      Conjunctiva/sclera:      Right eye: Hemorrhage present.      Left eye: Hemorrhage present.   Neck:      Musculoskeletal: Neck supple.   Cardiovascular:      Rate and Rhythm: Regular rhythm. Tachycardia present.      Pulses:           Radial pulses are 2+ on the right side and 2+ on the left side.        Dorsalis pedis pulses are 2+ on the right side and 2+ on the left side.      Heart sounds: No murmur.   Pulmonary:      Effort: Tachypnea and respiratory distress present.      Breath sounds: Rhonchi present. No wheezing.   Abdominal:      General: Bowel sounds are normal. There is no distension.      Palpations: Abdomen is soft.      Tenderness: There is no abdominal tenderness.   Musculoskeletal:         General: No swelling or tenderness.      Right lower leg: No edema.      Left lower leg: No edema.   Skin:     Coloration: Skin is not jaundiced.      Findings: No bruising.   Neurological:      General: No focal deficit present.      Mental Status: He is alert and oriented to person, place, and time. Mental status is at baseline.      Cranial Nerves: No  cranial nerve deficit.   Psychiatric:         Mood and Affect: Mood normal.         Fluids    Intake/Output Summary (Last 24 hours) at 12/27/2020 1121  Last data filed at 12/27/2020 0046  Gross per 24 hour   Intake 440 ml   Output 800 ml   Net -360 ml       Laboratory  Recent Labs     12/25/20  0430 12/26/20  0445 12/27/20  0600   WBC 14.1* 15.0* 14.2*   RBC 3.79* 3.54* 3.86*   HEMOGLOBIN 11.3* 10.4* 11.9*   HEMATOCRIT 35.4* 32.9* 36.6*   MCV 93.4 92.9 94.8   MCH 29.8 29.4 30.8   MCHC 31.9* 31.6* 32.5*   RDW 44.7 46.8 47.2   PLATELETCT 261 298 299   MPV 9.7 9.6 9.4     Recent Labs     12/25/20  0430 12/26/20  0445 12/27/20  0600   SODIUM 130* 137 136   POTASSIUM 3.9 4.2 5.2   CHLORIDE 95* 103 98   CO2 27 24 28   GLUCOSE 112* 122* 111*   BUN 9 13 11   CREATININE 0.72 0.70 0.65   CALCIUM 8.6 8.7 8.4*                   Imaging  EC-ECHOCARDIOGRAM LTD W/O CONT   Final Result      DX-CHEST-PORTABLE (1 VIEW)   Final Result      Persistent right greater left, consolidation consistent with Covid 19 pneumonia.      US-EXTREMITY VENOUS LOWER BILAT   Final Result      DX-CHEST-PORTABLE (1 VIEW)   Final Result      No significant change in Covid pneumonia.      US-EXTREMITY VENOUS LOWER BILAT   Final Result      EC-ECHOCARDIOGRAM LTD W/O CONT   Final Result      DX-CHEST-LIMITED (1 VIEW)   Final Result      No significant change in bilateral pneumonia.      DX-CHEST-LIMITED (1 VIEW)   Final Result         Persistent bilateral pulmonary opacities consistent with Covid 19 pneumonia.      DX-CHEST-PORTABLE (1 VIEW)   Final Result      Bilateral peripheral pulmonary airspace process highly suspicious for Covid pneumonia           Assessment/Plan  * Acute hypoxemic respiratory failure (HCC)- (present on admission)  Assessment & Plan  Likely  secondary to Covid pneumonia superimposed to the patient with pneumonia  Status post course of 10 days Decadron  Encourage incentive spirometer, self proning, titrate oxygen as needed  COVID + as  of 12/8    Leukocytosis  Assessment & Plan  12/21 WBC: 18.1  12/20 WBC:21.8  12/19 WBC:22  12/17 WBC:17.6  monitor vitals.  Continue to monitor    Pneumonia due to COVID-19 virus  Assessment & Plan  Tested COVID-19 positive on 12/8  Patient is on high flow,  S/p Decadron 6 mg p.o. daily until 12/17  S/p 5 days course of remdesivir with last dose 12/13.  continue incentive spirometer, provide Zn, vitamin D and ascorbic acid  Encourage proning and mobilization  Patient was treated with empiric IV antibiotics.  WBC and procalcitonin have been downtrending  Ultrasound negative for DVT in bilateral lower extremities, echocardiogram reveals normal LVEF    Anxiety  Assessment & Plan  Ordered Xanax 0.25mg TIDprn   On escitalopram 10mg daily    Elevated d-dimer- (present on admission)  Assessment & Plan  12/16 LE US negative for DVT  Echo from 12/11 reviewed with no right heart failure.    Abnormal LFTs  Assessment & Plan  Suspect due to covid 19   Monitoring CMP (last checked 12/13)  12/17 LFTs showing improvement.  Likely viral etiology    Aspiration pneumonia (HCC)  Assessment & Plan  Found to have left sided infiltrate  Bronchial breath sounds and egophony on the left lung exam   Started on HCap antibiotics including (Zosyn and doxycyline)   Follow up sputum and blood cultures  Respiratory care protocol   Placed on o2 therapy       Type 2 diabetes mellitus with hyperglycemia, without long-term current use of insulin (HCC)  Assessment & Plan  Glyco at 7.2, continue hypoglycemia protocol, accu-checks AC and HS    -- diabetic diet  Start lantus 11U for better BG control     Sepsis (HCC)  Assessment & Plan  This is Severe Sepsis Present on admission  SIRS criteria identified on my evaluation include: Tachycardia, with heart rate greater than 90 BPM, Tachypnea, with respirations greater than 20 per minute and Leukocytosis, with WBC greater than 12,000  Source of infection is pulmonary  Clinical indicators of end organ  dysfunction include Lactate >2 mmol/L (18.0 mg/dL)  Sepsis protocol initiated  Fluid resuscitation ordered per protocol  IV antibiotics as appropriate for source of sepsis  Reassessment: I have reassessed the patient's hemodynamic status  End organ dysfunction include(s):  Acute respiratory failure     Neuropathic pain- (present on admission)  Assessment & Plan  gabapentin       VTE prophylaxis: Enoxaparin 40mg daily

## 2020-12-27 NOTE — DIETARY
Nutrition Services: Brief update    RD following pt for adequate PO intake.  Pt is currently on a Consistent CHO (Diabetic) diet.   Per ADL flow sheet, PO has been ~25% of meals and % of oral nutrition supplement.  Wt appears stable (new wt obtained 12/24).  Per MD note, pt was up in chair today and appears to be improving.    Recommendations/Plan:  1. Continue to encourage intake of meals and supplements.  2. Continue to document intake of all meals and supplements as % taken in ADLs to provide interdisciplinary communication across all shifts.   3. Monitor weight.  4. Nutrition rep will continue to see patient for ongoing meal and snack preferences.     RD will continue to follow.

## 2020-12-27 NOTE — CARE PLAN
Problem: Nutritional:  Goal: Achieve adequate nutritional intake  Description: Patient will consume 50% of meals + supplements  Outcome: PROGRESSING SLOWER THAN EXPECTED   PO ~25% of meals; however, pt is consuming % of oral nutrition supplement when PO intake is low.  RD will continue to monitor and make interventions as needed.

## 2020-12-28 LAB
ANION GAP SERPL CALC-SCNC: 7 MMOL/L (ref 7–16)
BACTERIA BLD CULT: NORMAL
BACTERIA BLD CULT: NORMAL
BUN SERPL-MCNC: 13 MG/DL (ref 8–22)
CALCIUM SERPL-MCNC: 8.3 MG/DL (ref 8.5–10.5)
CHLORIDE SERPL-SCNC: 100 MMOL/L (ref 96–112)
CO2 SERPL-SCNC: 28 MMOL/L (ref 20–33)
CREAT SERPL-MCNC: 0.74 MG/DL (ref 0.5–1.4)
ERYTHROCYTE [DISTWIDTH] IN BLOOD BY AUTOMATED COUNT: 46.1 FL (ref 35.9–50)
GLUCOSE BLD-MCNC: 104 MG/DL (ref 65–99)
GLUCOSE BLD-MCNC: 104 MG/DL (ref 65–99)
GLUCOSE BLD-MCNC: 139 MG/DL (ref 65–99)
GLUCOSE SERPL-MCNC: 109 MG/DL (ref 65–99)
HCT VFR BLD AUTO: 36.5 % (ref 42–52)
HGB BLD-MCNC: 11.5 G/DL (ref 14–18)
MCH RBC QN AUTO: 29.3 PG (ref 27–33)
MCHC RBC AUTO-ENTMCNC: 31.5 G/DL (ref 33.7–35.3)
MCV RBC AUTO: 92.9 FL (ref 81.4–97.8)
PLATELET # BLD AUTO: 416 K/UL (ref 164–446)
PMV BLD AUTO: 9.4 FL (ref 9–12.9)
POTASSIUM SERPL-SCNC: 4.1 MMOL/L (ref 3.6–5.5)
RBC # BLD AUTO: 3.93 M/UL (ref 4.7–6.1)
SIGNIFICANT IND 70042: NORMAL
SIGNIFICANT IND 70042: NORMAL
SITE SITE: NORMAL
SITE SITE: NORMAL
SODIUM SERPL-SCNC: 135 MMOL/L (ref 135–145)
SOURCE SOURCE: NORMAL
SOURCE SOURCE: NORMAL
WBC # BLD AUTO: 10.4 K/UL (ref 4.8–10.8)

## 2020-12-28 PROCEDURE — 80048 BASIC METABOLIC PNL TOTAL CA: CPT

## 2020-12-28 PROCEDURE — A9270 NON-COVERED ITEM OR SERVICE: HCPCS | Performed by: HOSPITALIST

## 2020-12-28 PROCEDURE — 94640 AIRWAY INHALATION TREATMENT: CPT

## 2020-12-28 PROCEDURE — 700102 HCHG RX REV CODE 250 W/ 637 OVERRIDE(OP): Performed by: INTERNAL MEDICINE

## 2020-12-28 PROCEDURE — 700102 HCHG RX REV CODE 250 W/ 637 OVERRIDE(OP): Performed by: HOSPITALIST

## 2020-12-28 PROCEDURE — 770020 HCHG ROOM/CARE - TELE (206)

## 2020-12-28 PROCEDURE — 700111 HCHG RX REV CODE 636 W/ 250 OVERRIDE (IP): Performed by: HOSPITALIST

## 2020-12-28 PROCEDURE — 82962 GLUCOSE BLOOD TEST: CPT | Mod: 91

## 2020-12-28 PROCEDURE — 85027 COMPLETE CBC AUTOMATED: CPT

## 2020-12-28 PROCEDURE — 99232 SBSQ HOSP IP/OBS MODERATE 35: CPT | Performed by: HOSPITALIST

## 2020-12-28 PROCEDURE — A9270 NON-COVERED ITEM OR SERVICE: HCPCS | Performed by: INTERNAL MEDICINE

## 2020-12-28 PROCEDURE — 700105 HCHG RX REV CODE 258: Performed by: HOSPITALIST

## 2020-12-28 PROCEDURE — 700101 HCHG RX REV CODE 250: Performed by: HOSPITALIST

## 2020-12-28 PROCEDURE — 700111 HCHG RX REV CODE 636 W/ 250 OVERRIDE (IP): Performed by: EMERGENCY MEDICINE

## 2020-12-28 RX ORDER — AMOXICILLIN AND CLAVULANATE POTASSIUM 875; 125 MG/1; MG/1
1 TABLET, FILM COATED ORAL EVERY 12 HOURS
Status: COMPLETED | OUTPATIENT
Start: 2020-12-28 | End: 2020-12-30

## 2020-12-28 RX ADMIN — AMOXICILLIN AND CLAVULANATE POTASSIUM 1 TABLET: 875; 125 TABLET, FILM COATED ORAL at 17:48

## 2020-12-28 RX ADMIN — PIPERACILLIN AND TAZOBACTAM 4.5 G: 4; .5 INJECTION, POWDER, LYOPHILIZED, FOR SOLUTION INTRAVENOUS; PARENTERAL at 05:08

## 2020-12-28 RX ADMIN — GABAPENTIN 400 MG: 400 CAPSULE ORAL at 17:48

## 2020-12-28 RX ADMIN — IBUPROFEN 400 MG: 800 TABLET, FILM COATED ORAL at 12:52

## 2020-12-28 RX ADMIN — INSULIN GLARGINE 11 UNITS: 100 INJECTION, SOLUTION SUBCUTANEOUS at 17:45

## 2020-12-28 RX ADMIN — ESCITALOPRAM OXALATE 10 MG: 10 TABLET ORAL at 06:00

## 2020-12-28 RX ADMIN — GABAPENTIN 400 MG: 400 CAPSULE ORAL at 06:00

## 2020-12-28 RX ADMIN — OMEPRAZOLE 20 MG: 20 CAPSULE, DELAYED RELEASE ORAL at 06:00

## 2020-12-28 RX ADMIN — ALPRAZOLAM 0.25 MG: 0.25 TABLET ORAL at 12:53

## 2020-12-28 RX ADMIN — DOXYCYCLINE 100 MG: 100 TABLET, FILM COATED ORAL at 06:00

## 2020-12-28 RX ADMIN — DOXYCYCLINE 100 MG: 100 TABLET, FILM COATED ORAL at 17:48

## 2020-12-28 RX ADMIN — ENOXAPARIN SODIUM 40 MG: 40 INJECTION SUBCUTANEOUS at 06:00

## 2020-12-28 RX ADMIN — LIDOCAINE 1 PATCH: 50 PATCH TOPICAL at 12:53

## 2020-12-28 RX ADMIN — IBUPROFEN 400 MG: 800 TABLET, FILM COATED ORAL at 17:45

## 2020-12-28 ASSESSMENT — ENCOUNTER SYMPTOMS
ABDOMINAL PAIN: 0
NERVOUS/ANXIOUS: 1
HEADACHES: 0
DIZZINESS: 0
NAUSEA: 0
SORE THROAT: 0
FEVER: 0
PALPITATIONS: 0
SHORTNESS OF BREATH: 1
COUGH: 0

## 2020-12-28 NOTE — WOUND TEAM
[facility]  Inpatient Services  Initial Wound and Skin Care Evaluation    Admission Date: 12/8/2020     Last order of IP CONSULT TO WOUND CARE was found on 12/22/2020 from Hospital Encounter on 12/8/2020     HPI, PMH, SH: Reviewed    Unit where seen by Wound Team: [id number]     WOUND CONSULT/FOLLOW UP RELATED TO:  Bilateral ears.      Self Report / Pain Level:  Discomfort with assessment and bright lights.        OBJECTIVE:  In bed, head band to help with offloading.  mepilex sacral padding straps on right side.     WOUND TYPE, LOCATION, CHARACTERISTICS (Pressure Injuries: location, stage, POA or date identified)  Wound 12/22/20 Pressure Injury Ear Right R ear unstageable 12/22 (Active)   Wound Image    12/27/20 1700   Site Assessment Purple;Red    Periwound Assessment Intact    Margins Attached edges    Closure None    Drainage Amount None    Treatments Cleansed;Offloading;Site care    Periwound Protectant Skin Protectant Wipes to Periwound    Dressing Cleansing/Solutions Not Applicable    Dressing Options Hydrocolloid Thin    Dressing Changed New    Dressing Status Clean;Dry;Intact    Dressing Change/Treatment Frequency Every 72 hrs, and As Needed    NEXT Dressing Change/Treatment Date 12/30/20    NEXT Weekly Photo (Inpatient Only) 01/03/21    Pressure Injury Stage U    Wound Length (cm) 0.5 cm    Wound Width (cm) 0.5 cm    Wound Surface Area (cm^2) 0.25 cm^2    Wound Bed Eschar % - (Post-Procedure) 100 %    WOUND NURSE ONLY - Time Spent with Patient (mins) 60    Number of days: 5       Wound 12/22/20 Pressure Injury Ear Left L ear sDTI 12/22 (Active)   Wound Image    12/27/20 1700   Site Assessment Pink;Purple;Red    Periwound Assessment Intact    Margins Attached edges    Closure None    Drainage Amount None    Treatments Cleansed;Site care;Offloading    Wound Cleansing Normal Saline Irrigation    Periwound Protectant Skin Protectant Wipes to Periwound    Dressing Cleansing/Solutions Not Applicable     Dressing Options Hydrocolloid Thin    Dressing Changed New    Dressing Status Clean;Dry;Intact    Dressing Change/Treatment Frequency Every 72 hrs, and As Needed    NEXT Dressing Change/Treatment Date 12/30/20    NEXT Weekly Photo (Inpatient Only) 01/03/21    Pressure Injury Stage DTPI    Wound Length (cm) 0.3 cm    Wound Width (cm) 0.7 cm    Wound Surface Area (cm^2) 0.21 cm^2    Number of days: 5      Vascular:    JUNG:   No results found.    Lab Values:    Lab Results   Component Value Date/Time    WBC 14.2 (H) 12/27/2020 06:00 AM    RBC 3.86 (L) 12/27/2020 06:00 AM    HEMOGLOBIN 11.9 (L) 12/27/2020 06:00 AM    HEMATOCRIT 36.6 (L) 12/27/2020 06:00 AM    CREACTPROT 6.39 (H) 12/16/2020 10:00 AM    HBA1C 7.3 (H) 12/09/2020 08:28 AM        Culture Results show:  No results found for this or any previous visit (from the past 720 hour(s)).    INTERVENTIONS BY WOUND TEAM:  Chart and images reviewed. Discussed with bedside RN. This RN in to assess patient. Bilateral ears cleansed with NS and gauze, new photos taken.  Covered with cut piece of hydrocolloid thin and padded bilateral straps with cut piece of mepilex.     Interdisciplinary consultation: Patient, Bedside RN (Hallie)    EVALUATION / RATIONALE FOR TREATMENT: patient still on high flow nasal cannula.  Head band in place but not in use as straps still over ears.  Would team to continue to follow how wounds continue to evolve.       Goals: Steady decrease in wound area and depth weekly.    NURSING PLAN OF CARE ORDERS (X):    Dressing changes: See Dressing Care orders: X  Skin care: See Skin Care orders:   RN Prevention Protocol: X  Rectal tube care: See Rectal Tube Care orders:   Other orders:    RSKIN:   CURRENTLY IN PLACE (X), APPLIED THIS VISIT (A), ORDERED (O):   Q shift Gagandeep:  X  Q shift pressure point assessments:  X  Pressure redistribution mattress            Low Airloss        ICU bed   Bariatric CAMILO         Bariatric foam           Heel float boots      Heel Silicone dressing        Float Heels off Bed with Pillows               Barrier wipes         Barrier Cream         Barrier paste          Sacral silicone dressing         Silicone O2 tubing         Anchorfast         Cannula fixation Device (Tender )          Gray Foam Ear protectors     High flow offloading Clip    Elastic head band offloading device                X                                      Trach with Optifoam split foam       Z Stephen Pillow                             Waffle cushion        Waffle Overlay         Rectal tube or BMS    Purwick/Condom Cath          Antifungal tx      Interdry          Reposition q 2 hours    X  TAPs Turning system                 Up to chair        Ambulate      PT/OT        Dietician        Diabetes Education      PO  X   TF     TPN     NPO   # days   Other        WOUND TEAM PLAN OF CARE:   Dressing changes by wound team:                   Follow up 3 times weekly:                NPWT change 3 times weekly:     Follow up 1-2 times weekly:    X  Follow up Bi-Monthly:                   Follow up as needed:       Other (explain):     Anticipated discharge plans: TBA   LTACH:        SNF/Rehab:                  Home Health Care:           Outpatient Wound Center:            Self Care:

## 2020-12-29 LAB
ANION GAP SERPL CALC-SCNC: 11 MMOL/L (ref 7–16)
BUN SERPL-MCNC: 9 MG/DL (ref 8–22)
CALCIUM SERPL-MCNC: 8.6 MG/DL (ref 8.5–10.5)
CHLORIDE SERPL-SCNC: 98 MMOL/L (ref 96–112)
CO2 SERPL-SCNC: 24 MMOL/L (ref 20–33)
CREAT SERPL-MCNC: 0.55 MG/DL (ref 0.5–1.4)
ERYTHROCYTE [DISTWIDTH] IN BLOOD BY AUTOMATED COUNT: 44.5 FL (ref 35.9–50)
GLUCOSE BLD-MCNC: 102 MG/DL (ref 65–99)
GLUCOSE BLD-MCNC: 111 MG/DL (ref 65–99)
GLUCOSE BLD-MCNC: 121 MG/DL (ref 65–99)
GLUCOSE BLD-MCNC: 176 MG/DL (ref 65–99)
GLUCOSE SERPL-MCNC: 117 MG/DL (ref 65–99)
HCT VFR BLD AUTO: 34.6 % (ref 42–52)
HGB BLD-MCNC: 11.4 G/DL (ref 14–18)
MCH RBC QN AUTO: 30.2 PG (ref 27–33)
MCHC RBC AUTO-ENTMCNC: 32.9 G/DL (ref 33.7–35.3)
MCV RBC AUTO: 91.5 FL (ref 81.4–97.8)
PLATELET # BLD AUTO: 455 K/UL (ref 164–446)
PMV BLD AUTO: 9.4 FL (ref 9–12.9)
POTASSIUM SERPL-SCNC: 3.6 MMOL/L (ref 3.6–5.5)
RBC # BLD AUTO: 3.78 M/UL (ref 4.7–6.1)
SODIUM SERPL-SCNC: 133 MMOL/L (ref 135–145)
WBC # BLD AUTO: 10.1 K/UL (ref 4.8–10.8)

## 2020-12-29 PROCEDURE — 700102 HCHG RX REV CODE 250 W/ 637 OVERRIDE(OP): Performed by: HOSPITALIST

## 2020-12-29 PROCEDURE — A9270 NON-COVERED ITEM OR SERVICE: HCPCS | Performed by: HOSPITALIST

## 2020-12-29 PROCEDURE — 770020 HCHG ROOM/CARE - TELE (206)

## 2020-12-29 PROCEDURE — 97165 OT EVAL LOW COMPLEX 30 MIN: CPT

## 2020-12-29 PROCEDURE — A9270 NON-COVERED ITEM OR SERVICE: HCPCS | Performed by: INTERNAL MEDICINE

## 2020-12-29 PROCEDURE — 94640 AIRWAY INHALATION TREATMENT: CPT

## 2020-12-29 PROCEDURE — 80048 BASIC METABOLIC PNL TOTAL CA: CPT

## 2020-12-29 PROCEDURE — 700101 HCHG RX REV CODE 250: Performed by: HOSPITALIST

## 2020-12-29 PROCEDURE — 99233 SBSQ HOSP IP/OBS HIGH 50: CPT | Performed by: HOSPITALIST

## 2020-12-29 PROCEDURE — 700111 HCHG RX REV CODE 636 W/ 250 OVERRIDE (IP): Performed by: EMERGENCY MEDICINE

## 2020-12-29 PROCEDURE — 700102 HCHG RX REV CODE 250 W/ 637 OVERRIDE(OP): Performed by: INTERNAL MEDICINE

## 2020-12-29 PROCEDURE — 82962 GLUCOSE BLOOD TEST: CPT | Mod: 91

## 2020-12-29 PROCEDURE — 85027 COMPLETE CBC AUTOMATED: CPT

## 2020-12-29 RX ORDER — POTASSIUM CHLORIDE 20 MEQ/1
60 TABLET, EXTENDED RELEASE ORAL ONCE
Status: COMPLETED | OUTPATIENT
Start: 2020-12-29 | End: 2020-12-29

## 2020-12-29 RX ADMIN — IBUPROFEN 400 MG: 800 TABLET, FILM COATED ORAL at 17:37

## 2020-12-29 RX ADMIN — GABAPENTIN 400 MG: 400 CAPSULE ORAL at 17:37

## 2020-12-29 RX ADMIN — AMOXICILLIN AND CLAVULANATE POTASSIUM 1 TABLET: 875; 125 TABLET, FILM COATED ORAL at 17:37

## 2020-12-29 RX ADMIN — AMOXICILLIN AND CLAVULANATE POTASSIUM 1 TABLET: 875; 125 TABLET, FILM COATED ORAL at 04:32

## 2020-12-29 RX ADMIN — IBUPROFEN 400 MG: 800 TABLET, FILM COATED ORAL at 11:26

## 2020-12-29 RX ADMIN — INSULIN GLARGINE 11 UNITS: 100 INJECTION, SOLUTION SUBCUTANEOUS at 17:40

## 2020-12-29 RX ADMIN — DOXYCYCLINE 100 MG: 100 TABLET, FILM COATED ORAL at 04:32

## 2020-12-29 RX ADMIN — IBUPROFEN 400 MG: 800 TABLET, FILM COATED ORAL at 04:32

## 2020-12-29 RX ADMIN — OMEPRAZOLE 20 MG: 20 CAPSULE, DELAYED RELEASE ORAL at 04:32

## 2020-12-29 RX ADMIN — LIDOCAINE 1 PATCH: 50 PATCH TOPICAL at 10:32

## 2020-12-29 RX ADMIN — DOXYCYCLINE 100 MG: 100 TABLET, FILM COATED ORAL at 17:37

## 2020-12-29 RX ADMIN — GABAPENTIN 400 MG: 400 CAPSULE ORAL at 04:32

## 2020-12-29 RX ADMIN — ENOXAPARIN SODIUM 40 MG: 40 INJECTION SUBCUTANEOUS at 04:32

## 2020-12-29 RX ADMIN — INSULIN HUMAN 3 UNITS: 100 INJECTION, SOLUTION PARENTERAL at 22:56

## 2020-12-29 RX ADMIN — POTASSIUM CHLORIDE 60 MEQ: 1500 TABLET, EXTENDED RELEASE ORAL at 08:30

## 2020-12-29 RX ADMIN — ESCITALOPRAM OXALATE 10 MG: 10 TABLET ORAL at 04:31

## 2020-12-29 RX ADMIN — ALPRAZOLAM 0.25 MG: 0.25 TABLET ORAL at 04:32

## 2020-12-29 ASSESSMENT — ENCOUNTER SYMPTOMS
CHILLS: 0
SPUTUM PRODUCTION: 1
FEVER: 0
NAUSEA: 0
ROS GI COMMENTS: POOR APPETITE
VOMITING: 0
DIARRHEA: 0
SHORTNESS OF BREATH: 1
COUGH: 1

## 2020-12-29 ASSESSMENT — COGNITIVE AND FUNCTIONAL STATUS - GENERAL
HELP NEEDED FOR BATHING: A LITTLE
DAILY ACTIVITIY SCORE: 20
SUGGESTED CMS G CODE MODIFIER DAILY ACTIVITY: CJ
EATING MEALS: A LITTLE
TOILETING: A LITTLE
DRESSING REGULAR LOWER BODY CLOTHING: A LITTLE

## 2020-12-29 ASSESSMENT — ACTIVITIES OF DAILY LIVING (ADL): TOILETING: INDEPENDENT

## 2020-12-29 NOTE — PROGRESS NOTES
Hospital Medicine Daily Progress Note    Date of Service  12/29/2020    Chief Complaint  55 y.o. male admitted 12/8/2020 with shortness of breath.    Hospital Course  55 y.o. male who presented 12/8/2020 with past medical history of chronic neuropathy who presents to the hospital for shortness of breath.  This patient states that his symptoms started about 9 days ago.  He initially started with symptoms of diarrhea and vomiting.  Also with some generalized body aches.  Subsequently started developing shortness of breath with cough congestion and sputum production.  Otherwise no known alleviating or exacerbating factors to his symptoms.  He did get tested for COVID-19 however did not follow-up the test results.  Shortness of breath has gradually worsened over the last several days and now is severe.     In the emergency department is found to be in respiratory distress and placed on high flow nasal cannula and was  admitted to the hospital service for further management of his symptoms    Interval Problem Update  12/15: On high flow NC (60L at 100%). On day 7 of 10 Decadron.  Tolerating diet.    12/16: Alert but ongoing respiratory distress.  Acute worsening this am with sinus tachycardia on telemetry and worsening oxygen saturation.  An ABG was requested and showed pH:7.46/pCO2:34.5/pO2:48.  I alerted Dr Mayo for possible need of bipap or intubation.   12/17: Still tenuous from a respiratory standpoint.  He is anxious at times and a bit scared.  Used  via iPad to communicate with him this morning.  He remains on 90% FiO2 with 60 L O2.   12/18: On 60L 100% and rapid shallow breathing.  Tearful at times.  Denied pain.  Able to talk in short sentences.  Discussed with Dr Mayo    12/19: Remains on high amounts of oxygen and very weak.  He states he is scared and nervous.  He remains on 60L and 100% via HFNC. I have called his sister and updated her on her brother's status.   12/20 Less respiratory  distress.  Watching TV.  Some episodes on anxiety and RN states the antivan the other day helped patient.  Eating breakfast.   12/21 Looks better today but still very weak.  Oxygen now down to 50L and 50% via HFNC.  Awake and eating breakfast.  Anxious at times.  Watches TV.   12/22: Patient seen and examined by me today.  He was complaining about chest pain but EKG found Q waves in inferior leads but no significant ST segment changes.  He remains on 50 L, 50% FiO2 of high flow nasal cannula.  Continue incentive spirometer and ambulation to wean off oxygen.  In addition, we will start Lantus to help control blood glucose better.   12/23: Patient continues to complain of chest pain..  MA depressions, ST elevations on EKG indicating pericarditis.  Limited cardiac echo ordered to evaluate for pericardial effusion.  In addition I started him on ibuprofen and Cardizem.  Patient did have increasing oxygen requirement today.   12/24: Overnight patient found to be septic protocol was started.  So far cultures have remained negative.  Likely source is a left-sided pneumonia found on chest x-ray.  This is likely cause of his pleuritic chest pain as well.  IV Unasyn and doxycycline has been started.  Since patient still spiking fevers and has lactic acidosis I will transition him to IV Zosyn.   12/25: Patient continues to complain of left-sided pleuritic pain.  Started lidocaine patch and prescribed ibuprofen.  He is taking shallow breaths and chest x-ray found bilateral atelectasis.  Encouraged incentive spirometry and ambulation.  He is having increased leukocytosis will continue IV antibiotics for now.  Cultures are negative to date.  12/26: Patient does have increased leukocytosis.  We will continue to trend this and get a procalcitonin level.  Patient states that his pain is much better controlled but he still taking shallow breaths.  I prescribed him hydrocodone cough syrup.  Blood cultures remain negative to  date.   12/27: Improving patient's hypoxia today and was found sitting in chair.  I have encouraged aggressive ISS and ambulation.  Patient states that his pain is improving and I encouraged him to take deep breaths.  Patient's procalcitonin is trending downwards.  improvement in patient's WBC count.  12/28: Patient had no acute issues today.  Transitioning from Zosyn to Augmentin.  Encouraged ISS and ambulation.  12/29: Mr. Hall was evaluated and examined in the ICU. He remains on high flow oxygen 70 liters and 90%. He has been up to side of bed though gets quite SOB with exertion. His appetite is poor. Glucose levels low hundreds.    Consultants/Specialty  Critical care    Code Status  Full Code    Disposition  Medical with high-flow capacity    Review of Systems  Review of Systems   Constitutional: Negative for chills and fever.   Respiratory: Positive for cough, sputum production and shortness of breath.    Cardiovascular: Negative for chest pain.   Gastrointestinal: Negative for diarrhea, nausea and vomiting.        Poor appetite   All other systems reviewed and are negative.       Physical Exam  Temp:  [36.3 °C (97.3 °F)-37.2 °C (98.9 °F)] 36.3 °C (97.3 °F)  Pulse:  [] 90  Resp:  [25-49] 29  BP: ()/(37-89) 99/64  SpO2:  [80 %-98 %] 98 %    Physical Exam  Vitals signs and nursing note reviewed.   Constitutional:       General: He is not in acute distress.     Appearance: He is ill-appearing. He is not toxic-appearing.   HENT:      Head: Normocephalic and atraumatic.      Mouth/Throat:      Mouth: Mucous membranes are dry.      Pharynx: Oropharynx is clear.   Eyes:      General: No scleral icterus.     Conjunctiva/sclera: Conjunctivae normal.   Neck:      Musculoskeletal: Normal range of motion and neck supple.   Cardiovascular:      Comments: Distant regular heart sounds  Pulmonary:      Comments: High flow oxygen  Increased work of breathing  Few crackles otherwise clear lung sounds  Abdominal:       General: There is no distension.      Tenderness: There is no abdominal tenderness.   Musculoskeletal:      Right lower leg: No edema.      Left lower leg: No edema.   Skin:     General: Skin is warm and dry.   Neurological:      General: No focal deficit present.      Mental Status: He is alert and oriented to person, place, and time.   Psychiatric:         Mood and Affect: Mood normal.         Behavior: Behavior normal.         Fluids    Intake/Output Summary (Last 24 hours) at 12/29/2020 0830  Last data filed at 12/29/2020 0500  Gross per 24 hour   Intake 100 ml   Output 1460 ml   Net -1360 ml       Laboratory  Recent Labs     12/27/20  0600 12/28/20  0515 12/29/20  0450   WBC 14.2* 10.4 10.1   RBC 3.86* 3.93* 3.78*   HEMOGLOBIN 11.9* 11.5* 11.4*   HEMATOCRIT 36.6* 36.5* 34.6*   MCV 94.8 92.9 91.5   MCH 30.8 29.3 30.2   MCHC 32.5* 31.5* 32.9*   RDW 47.2 46.1 44.5   PLATELETCT 299 416 455*   MPV 9.4 9.4 9.4     Recent Labs     12/27/20  0600 12/28/20  0515 12/29/20  0450   SODIUM 136 135 133*   POTASSIUM 5.2 4.1 3.6   CHLORIDE 98 100 98   CO2 28 28 24   GLUCOSE 111* 109* 117*   BUN 11 13 9   CREATININE 0.65 0.74 0.55   CALCIUM 8.4* 8.3* 8.6                   Imaging  EC-ECHOCARDIOGRAM LTD W/O CONT   Final Result      DX-CHEST-PORTABLE (1 VIEW)   Final Result      Persistent right greater left, consolidation consistent with Covid 19 pneumonia.      US-EXTREMITY VENOUS LOWER BILAT   Final Result      DX-CHEST-PORTABLE (1 VIEW)   Final Result      No significant change in Covid pneumonia.      US-EXTREMITY VENOUS LOWER BILAT   Final Result      EC-ECHOCARDIOGRAM LTD W/O CONT   Final Result      DX-CHEST-LIMITED (1 VIEW)   Final Result      No significant change in bilateral pneumonia.      DX-CHEST-LIMITED (1 VIEW)   Final Result         Persistent bilateral pulmonary opacities consistent with Covid 19 pneumonia.      DX-CHEST-PORTABLE (1 VIEW)   Final Result      Bilateral peripheral pulmonary airspace process  highly suspicious for Covid pneumonia           Assessment/Plan  * Acute hypoxemic respiratory failure (HCC)- (present on admission)  Assessment & Plan  Secondary to COVID pneumonia superimposed to the patient with pneumonia  Status post course of 10 days Decadron  Encourage incentive spirometer, self proning, titrate oxygen as needed  COVID + as of 12/8    Pneumonia due to COVID-19 virus  Assessment & Plan  Tested COVID-19 positive on 12/8  Patient is on high flow,  S/p Decadron 6 mg p.o. daily until 12/17  S/p 5 days course of remdesivir with last dose 12/13.  continue incentive spirometer, provide Zn, vitamin D and ascorbic acid  Encourage proning and mobilization  Patient was treated with empiric IV antibiotics.  WBC and procalcitonin have been downtrending  Ultrasound negative for DVT in bilateral lower extremities, echocardiogram reveals normal LVEF    Anxiety  Assessment & Plan  Ordered Xanax 0.25mg TIDprn   On escitalopram 10mg daily    Elevated d-dimer- (present on admission)  Assessment & Plan  12/16 LE US negative for DVT  Echo from 12/11 reviewed with no right heart failure.    Abnormal LFTs  Assessment & Plan  Suspect due to covid 19   Monitoring CMP (last checked 12/13)  12/17 LFTs showing improvement.  Likely viral etiology    Aspiration pneumonia (HCC)  Assessment & Plan  Found to have left sided infiltrate  Bronchial breath sounds and egophony on the left lung exam   Started on HCAP antibiotics including (Zosyn and doxycyline) and de-escalated to Augmentin/doxy (procalcitonin had been 5 after admit though went down to 0.4 on 12/26.  Negative blood cultures from 12/23  Respiratory care protocol   Placed on o2 therapy       Type 2 diabetes mellitus with hyperglycemia, without long-term current use of insulin (HCC)  Assessment & Plan  HbA1c 7.2, continue hypoglycemia protocol, accu-checks AC and HS and diabetic diet  Started lantus 11U for better BG control   Blood glucose low hundreds    Sepsis  (HCC)  Assessment & Plan  This is Severe Sepsis Present on admission  SIRS criteria identified on my evaluation include: Tachycardia, with heart rate greater than 90 BPM, Tachypnea, with respirations greater than 20 per minute and Leukocytosis, with WBC greater than 12,000  Source of infection is pulmonary  Clinical indicators of end organ dysfunction include Lactate >2 mmol/L (18.0 mg/dL)  Sepsis protocol initiated  Fluid resuscitation ordered per protocol  IV antibiotics as appropriate for source of sepsis  Reassessment: I have reassessed the patient's hemodynamic status  End organ dysfunction include(s):  Acute respiratory failure     Neuropathic pain- (present on admission)  Assessment & Plan  gabapentin       VTE prophylaxis: lovenox

## 2020-12-29 NOTE — THERAPY
"Occupational Therapy   Initial Evaluation     Patient Name: Singh Hall  Age:  55 y.o., Sex:  male  Medical Record #: [id number]  Today's Date: 12/29/2020       Precautions: Fall Risk  Comments: O2 sats drop with activity & has increased WOB    Assessment  Patient is 55 y.o. male with a diagnosis of DMII, neuropathy, admitted 12/8/2020 with COVID-19 infection requiring ICU hospitalization due to O2 demand..  Additional factors influencing patient status / progress: Pt continues to require Non-rebreather mask to maintain O2 sats & does become anxious with activity.  Pt currently is able to perform brief ADL's with supervision/Min A due to fatigue, SOB & increased WOB.  Pt will benefit from [facility] OT services to maximize his independence prior to D/C home.      Plan    Recommend Occupational Therapy 2 times per week until therapy goals are met for the following treatments:  Adaptive Equipment, Neuro Re-Education / Balance, Self Care/Activities of Daily Living, Therapeutic Activities and Therapeutic Exercises.    DC Equipment Recommendations: Unable to determine at this time  Discharge Recommendations: Recommend home health for continued occupational therapy services     Subjective    \"To breath better\"     Objective       12/29/20 1211   Prior Living Situation   Prior Services None   Housing / Facility 1 Story Apartment / Condo   Steps Into Home 1   Bathroom Set up Bathtub / Shower Combination   Equipment Owned None   Lives with - Patient's Self Care Capacity Parents  (pt lives with his dad, also has 2 sisters who may be able to)   Cognition    Comments pleasant, a bit anxious at times due to increased WOB   Balance Assessment   Sitting Balance (Static) Fair   Sitting Balance (Dynamic) Fair   Standing Balance (Static) Fair -   Standing Balance (Dynamic) Poor +   Weight Shift Sitting Fair   Weight Shift Standing Fair   Comments limited by SOB & fatigue   ADL Assessment   Eating Supervision   Grooming Minimal " Assist;Seated  (pt reluctant to engage in grooming due to fatigue)   Bathing Minimal Assist   Upper Body Dressing Supervision   Lower Body Dressing Minimal Assist   Toileting Minimal Assist   Functional Mobility   Sit to Stand Minimal Assist   Bed, Chair, Wheelchair Transfer Minimal Assist   Toilet Transfers Minimal Assist  (BSC)   Patient / Family Goals   Patient / Family Goal #1 To breath better   Short Term Goals   Short Term Goal # 1 Pt will maintain O2 sats >90% during ADL's   Short Term Goal # 2 Pt will amb to bathroom with supervision   Short Term Goal # 3 Pt will dress LB with supervision   Short Term Goal # 4 Pt will demonstrate good energy conservation techniques during ADL's

## 2020-12-29 NOTE — PROGRESS NOTES
Hospital Medicine Daily Progress Note    Date of Service  12/28/2020    Chief Complaint  Short of breath with diarrhea, vomiting x 2 weeks.    Hospital Course  I[place]ic speaking 55 y.o. male with DMII, neuropathy, admitted 12/8/2020 with COVID-19 infection requiring ICU hospitalization due to O2 demand.    Interval Problem Update  12/15: On high flow NC (60L at 100%). On day 7 of 10 Decadron.  Tolerating diet.     12/16: Alert but ongoing respiratory distress.  Acute worsening this am with sinus tachycardia on telemetry and worsening oxygen saturation.  An ABG was requested and showed pH:7.46/pCO2:34.5/pO2:48.  I alerted Dr Mayo for possible need of bipap or intubation.    12/17: Still tenuous from a respiratory standpoint.  He is anxious at times and a bit scared.  Used  via iPad to communicate with him this morning.  He remains on 90% FiO2 with 60 L O2.    12/18: On 60L 100% and rapid shallow breathing.  Tearful at times.  Denied pain.  Able to talk in short sentences.  Discussed with Dr Mayo     12/19: Remains on high amounts of oxygen and very weak.  He states he is scared and nervous.  He remains on 60L and 100% via HFNC. I have called his sister and updated her on her brother's status.    12/20 Less respiratory distress.  Watching TV.  Some episodes on anxiety and RN states the antivan the other day helped patient.  Eating breakfast.    12/21 Looks better today but still very weak.  Oxygen now down to 50L and 50% via HFNC.  Awake and eating breakfast.  Anxious at times.  Watches TV.    12/22: Patient seen and examined by me today.  He was complaining about chest pain but EKG found Q waves in inferior leads but no significant ST segment changes.  He remains on 50 L, 50% FiO2 of high flow nasal cannula.  Continue incentive spirometer and ambulation to wean off oxygen.  In addition, we will start Lantus to help control blood glucose better.    12/23: Patient continues to complain of chest pain..  SC  depressions, ST elevations on EKG indicating pericarditis.  Limited cardiac echo ordered to evaluate for pericardial effusion.  In addition I started him on ibuprofen and Cardizem.  Patient did have increasing oxygen requirement today.    12/24: Overnight patient found to be septic protocol was started.  So far cultures have remained negative.  Likely source is a left-sided pneumonia found on chest x-ray.  This is likely cause of his pleuritic chest pain as well.  IV Unasyn and doxycycline has been started.  Since patient still spiking fevers and has lactic acidosis I will transition him to IV Zosyn.    12/25: Patient continues to complain of left-sided pleuritic pain.  Started lidocaine patch and prescribed ibuprofen.  He is taking shallow breaths and chest x-ray found bilateral atelectasis.  Encouraged incentive spirometry and ambulation.  He is having increased leukocytosis will continue IV antibiotics for now.  Cultures are negative to date.    12/26: Patient does have increased leukocytosis.  We will continue to trend this and get a procalcitonin level.  Patient states that his pain is much better controlled but he still taking shallow breaths.  I prescribed him hydrocodone cough syrup.  Blood cultures remain negative to date.    12/27: Improving patient's hypoxia today and was found sitting in chair.  I have encouraged aggressive ISS and ambulation.  Patient states that his pain is improving and I encouraged him to take deep breaths.  Patient's procalcitonin is trending downwards.  improvement in patient's WBC count.    12/28: Patient had no acute issues today.  Transitioning from Zosyn to Augmentin.  Encouraged ISS and ambulation.  Consultants/Specialty  Pulmonary    Code Status  Full Code    Disposition  Transfer to [facility] floor.    Review of Systems  Review of Systems   Constitutional: Positive for malaise/fatigue. Negative for fever.   HENT: Negative for sore throat.    Respiratory: Positive for shortness  of breath. Negative for cough.    Cardiovascular: Negative for chest pain, palpitations and leg swelling.   Gastrointestinal: Negative for abdominal pain and nausea.   Genitourinary: Negative for dysuria.   Neurological: Negative for dizziness and headaches.   Psychiatric/Behavioral: The patient is nervous/anxious.         Physical Exam  Temp:  [36.9 °C (98.4 °F)] 36.9 °C (98.4 °F)  Pulse:  [] 97  Resp:  [21-48] 36  BP: ()/(57-74) 122/73  SpO2:  [89 %-97 %] 92 %    Physical Exam  Vitals signs reviewed.   Constitutional:       General: He is in acute distress.      Appearance: He is ill-appearing. He is not diaphoretic.   HENT:      Head: Normocephalic and atraumatic.      Nose: Nose normal.      Mouth/Throat:      Mouth: Mucous membranes are dry.      Pharynx: No oropharyngeal exudate.   Eyes:      General:         Right eye: No discharge.         Left eye: No discharge.      Extraocular Movements: Extraocular movements intact.      Conjunctiva/sclera:      Right eye: Hemorrhage present.      Left eye: Hemorrhage present.   Neck:      Musculoskeletal: Neck supple.   Cardiovascular:      Rate and Rhythm: Regular rhythm. Tachycardia present.      Pulses:           Radial pulses are 2+ on the right side and 2+ on the left side.        Dorsalis pedis pulses are 2+ on the right side and 2+ on the left side.      Heart sounds: No murmur.   Pulmonary:      Effort: Tachypnea and respiratory distress present.      Breath sounds: Rhonchi present. No wheezing.   Abdominal:      General: Bowel sounds are normal. There is no distension.      Palpations: Abdomen is soft.      Tenderness: There is no abdominal tenderness.   Musculoskeletal:         General: No swelling or tenderness.      Right lower leg: No edema.      Left lower leg: No edema.   Skin:     Coloration: Skin is not jaundiced.      Findings: No bruising.   Neurological:      General: No focal deficit present.      Mental Status: He is alert and oriented  to person, place, and time. Mental status is at baseline.      Cranial Nerves: No cranial nerve deficit.   Psychiatric:         Mood and Affect: Mood normal.         Fluids    Intake/Output Summary (Last 24 hours) at 12/28/2020 1720  Last data filed at 12/28/2020 0508  Gross per 24 hour   Intake 420 ml   Output 950 ml   Net -530 ml       Laboratory  Recent Labs     12/26/20  0445 12/27/20  0600 12/28/20  0515   WBC 15.0* 14.2* 10.4   RBC 3.54* 3.86* 3.93*   HEMOGLOBIN 10.4* 11.9* 11.5*   HEMATOCRIT 32.9* 36.6* 36.5*   MCV 92.9 94.8 92.9   MCH 29.4 30.8 29.3   MCHC 31.6* 32.5* 31.5*   RDW 46.8 47.2 46.1   PLATELETCT 298 299 416   MPV 9.6 9.4 9.4     Recent Labs     12/26/20 0445 12/27/20  0600 12/28/20  0515   SODIUM 137 136 135   POTASSIUM 4.2 5.2 4.1   CHLORIDE 103 98 100   CO2 24 28 28   GLUCOSE 122* 111* 109*   BUN 13 11 13   CREATININE 0.70 0.65 0.74   CALCIUM 8.7 8.4* 8.3*                   Imaging  EC-ECHOCARDIOGRAM LTD W/O CONT   Final Result      DX-CHEST-PORTABLE (1 VIEW)   Final Result      Persistent right greater left, consolidation consistent with Covid 19 pneumonia.      US-EXTREMITY VENOUS LOWER BILAT   Final Result      DX-CHEST-PORTABLE (1 VIEW)   Final Result      No significant change in Covid pneumonia.      US-EXTREMITY VENOUS LOWER BILAT   Final Result      EC-ECHOCARDIOGRAM LTD W/O CONT   Final Result      DX-CHEST-LIMITED (1 VIEW)   Final Result      No significant change in bilateral pneumonia.      DX-CHEST-LIMITED (1 VIEW)   Final Result         Persistent bilateral pulmonary opacities consistent with Covid 19 pneumonia.      DX-CHEST-PORTABLE (1 VIEW)   Final Result      Bilateral peripheral pulmonary airspace process highly suspicious for Covid pneumonia           Assessment/Plan  * Acute hypoxemic respiratory failure (HCC)- (present on admission)  Assessment & Plan  Likely  secondary to Covid pneumonia superimposed to the patient with pneumonia  Status post course of 10 days  Decadron  Encourage incentive spirometer, self proning, titrate oxygen as needed  COVID + as of 12/8    Leukocytosis  Assessment & Plan  12/21 WBC: 18.1  12/20 WBC:21.8  12/19 WBC:22  12/17 WBC:17.6  monitor vitals.  Continue to monitor    Pneumonia due to COVID-19 virus  Assessment & Plan  Tested COVID-19 positive on 12/8  Patient is on high flow,  S/p Decadron 6 mg p.o. daily until 12/17  S/p 5 days course of remdesivir with last dose 12/13.  continue incentive spirometer, provide Zn, vitamin D and ascorbic acid  Encourage proning and mobilization  Patient was treated with empiric IV antibiotics.  WBC and procalcitonin have been downtrending  Ultrasound negative for DVT in bilateral lower extremities, echocardiogram reveals normal LVEF    Anxiety  Assessment & Plan  Ordered Xanax 0.25mg TIDprn   On escitalopram 10mg daily    Elevated d-dimer- (present on admission)  Assessment & Plan  12/16 LE US negative for DVT  Echo from 12/11 reviewed with no right heart failure.    Abnormal LFTs  Assessment & Plan  Suspect due to covid 19   Monitoring CMP (last checked 12/13)  12/17 LFTs showing improvement.  Likely viral etiology    Aspiration pneumonia (HCC)  Assessment & Plan  Found to have left sided infiltrate  Bronchial breath sounds and egophony on the left lung exam   Started on HCap antibiotics including (Zosyn and doxycyline)   Follow up sputum and blood cultures  Respiratory care protocol   Placed on o2 therapy       Type 2 diabetes mellitus with hyperglycemia, without long-term current use of insulin (HCC)  Assessment & Plan  Glyco at 7.2, continue hypoglycemia protocol, accu-checks AC and HS    -- diabetic diet  Start lantus 11U for better BG control     Sepsis (HCC)  Assessment & Plan  This is Severe Sepsis Present on admission  SIRS criteria identified on my evaluation include: Tachycardia, with heart rate greater than 90 BPM, Tachypnea, with respirations greater than 20 per minute and Leukocytosis, with WBC  greater than 12,000  Source of infection is pulmonary  Clinical indicators of end organ dysfunction include Lactate >2 mmol/L (18.0 mg/dL)  Sepsis protocol initiated  Fluid resuscitation ordered per protocol  IV antibiotics as appropriate for source of sepsis  Reassessment: I have reassessed the patient's hemodynamic status  End organ dysfunction include(s):  Acute respiratory failure     Neuropathic pain- (present on admission)  Assessment & Plan  gabapentin       VTE prophylaxis: Enoxaparin 40mg daily

## 2020-12-29 NOTE — PROGRESS NOTES
Pt attempted to get up to take himself to the bathroom without assistance and got caught on his monitoring wires and slid to the floor. RN and aide standing at door getting gowns on and witnessed slide to floor. Pt had non slip socks on. Pt denies any pain or discomfort. Did not hit head. No signs of injury. VSS. MD aware. No new orders. Pt educated to use call light for assistance before trying to get up and call light in place. Verbalizes understanding. Bed Alarm on. Non skid socks on.

## 2020-12-30 LAB
GLUCOSE BLD-MCNC: 108 MG/DL (ref 65–99)
GLUCOSE BLD-MCNC: 114 MG/DL (ref 65–99)
GLUCOSE BLD-MCNC: 126 MG/DL (ref 65–99)
GLUCOSE BLD-MCNC: 92 MG/DL (ref 65–99)

## 2020-12-30 PROCEDURE — A9270 NON-COVERED ITEM OR SERVICE: HCPCS | Performed by: HOSPITALIST

## 2020-12-30 PROCEDURE — 700101 HCHG RX REV CODE 250: Performed by: HOSPITALIST

## 2020-12-30 PROCEDURE — 700102 HCHG RX REV CODE 250 W/ 637 OVERRIDE(OP): Performed by: HOSPITALIST

## 2020-12-30 PROCEDURE — A9270 NON-COVERED ITEM OR SERVICE: HCPCS | Performed by: INTERNAL MEDICINE

## 2020-12-30 PROCEDURE — 82962 GLUCOSE BLOOD TEST: CPT

## 2020-12-30 PROCEDURE — 94760 N-INVAS EAR/PLS OXIMETRY 1: CPT

## 2020-12-30 PROCEDURE — 94640 AIRWAY INHALATION TREATMENT: CPT

## 2020-12-30 PROCEDURE — 700111 HCHG RX REV CODE 636 W/ 250 OVERRIDE (IP): Performed by: EMERGENCY MEDICINE

## 2020-12-30 PROCEDURE — 770020 HCHG ROOM/CARE - TELE (206)

## 2020-12-30 PROCEDURE — 99233 SBSQ HOSP IP/OBS HIGH 50: CPT | Performed by: HOSPITALIST

## 2020-12-30 PROCEDURE — 700102 HCHG RX REV CODE 250 W/ 637 OVERRIDE(OP): Performed by: INTERNAL MEDICINE

## 2020-12-30 RX ADMIN — OMEPRAZOLE 20 MG: 20 CAPSULE, DELAYED RELEASE ORAL at 05:37

## 2020-12-30 RX ADMIN — LIDOCAINE 1 PATCH: 50 PATCH TOPICAL at 11:47

## 2020-12-30 RX ADMIN — IBUPROFEN 400 MG: 800 TABLET, FILM COATED ORAL at 17:26

## 2020-12-30 RX ADMIN — ALPRAZOLAM 0.25 MG: 0.25 TABLET ORAL at 11:47

## 2020-12-30 RX ADMIN — GABAPENTIN 400 MG: 400 CAPSULE ORAL at 17:26

## 2020-12-30 RX ADMIN — IBUPROFEN 400 MG: 800 TABLET, FILM COATED ORAL at 05:38

## 2020-12-30 RX ADMIN — AMOXICILLIN AND CLAVULANATE POTASSIUM 1 TABLET: 875; 125 TABLET, FILM COATED ORAL at 05:38

## 2020-12-30 RX ADMIN — GABAPENTIN 400 MG: 400 CAPSULE ORAL at 05:37

## 2020-12-30 RX ADMIN — ENOXAPARIN SODIUM 40 MG: 40 INJECTION SUBCUTANEOUS at 05:38

## 2020-12-30 RX ADMIN — ESCITALOPRAM OXALATE 10 MG: 10 TABLET ORAL at 05:38

## 2020-12-30 RX ADMIN — INSULIN GLARGINE 11 UNITS: 100 INJECTION, SOLUTION SUBCUTANEOUS at 17:29

## 2020-12-30 RX ADMIN — DOXYCYCLINE 100 MG: 100 TABLET, FILM COATED ORAL at 05:38

## 2020-12-30 RX ADMIN — IBUPROFEN 400 MG: 800 TABLET, FILM COATED ORAL at 11:47

## 2020-12-30 ASSESSMENT — ENCOUNTER SYMPTOMS
NAUSEA: 0
CHILLS: 0
SHORTNESS OF BREATH: 1
COUGH: 1
SPUTUM PRODUCTION: 1
FEVER: 0
VOMITING: 0
ROS GI COMMENTS: IMPROVED APPETITE

## 2020-12-30 ASSESSMENT — PAIN DESCRIPTION - PAIN TYPE: TYPE: ACUTE PAIN;CHRONIC PAIN

## 2020-12-30 NOTE — DISCHARGE PLANNING
Anticipated Discharge Disposition: TBD    Action: per chart review, pt needing HFNC. Pending Medicaid. Per PFA note 12/21, Medicaid application was completed and mailed to sister for signature. HH recommended by OT 12/29    Barriers to Discharge:   Access to Healthcare insurance    Plan: TBD

## 2020-12-30 NOTE — PROGRESS NOTES
Hospital Medicine Daily Progress Note    Date of Service  12/30/2020    Chief Complaint  55 y.o. male admitted 12/8/2020 with shortness of breath.    Hospital Course  55 y.o. male who presented 12/8/2020 with past medical history of chronic neuropathy who presents to the hospital for shortness of breath.  This patient states that his symptoms started about 9 days ago.  He initially started with symptoms of diarrhea and vomiting.  Also with some generalized body aches.  Subsequently started developing shortness of breath with cough congestion and sputum production.  Otherwise no known alleviating or exacerbating factors to his symptoms.  He did get tested for COVID-19 however did not follow-up the test results.  Shortness of breath has gradually worsened over the last several days and now is severe.     In the emergency department is found to be in respiratory distress and placed on high flow nasal cannula and was  admitted to the hospital service for further management of his symptoms    Interval Problem Update  12/15: On high flow NC (60L at 100%). On day 7 of 10 Decadron.  Tolerating diet.    12/16: Alert but ongoing respiratory distress.  Acute worsening this am with sinus tachycardia on telemetry and worsening oxygen saturation.  An ABG was requested and showed pH:7.46/pCO2:34.5/pO2:48.  I alerted Dr Mayo for possible need of bipap or intubation.   12/17: Still tenuous from a respiratory standpoint.  He is anxious at times and a bit scared.  Used  via iPad to communicate with him this morning.  He remains on 90% FiO2 with 60 L O2.   12/18: On 60L 100% and rapid shallow breathing.  Tearful at times.  Denied pain.  Able to talk in short sentences.  Discussed with Dr Mayo    12/19: Remains on high amounts of oxygen and very weak.  He states he is scared and nervous.  He remains on 60L and 100% via HFNC. I have called his sister and updated her on her brother's status.   12/20 Less respiratory  distress.  Watching TV.  Some episodes on anxiety and RN states the antivan the other day helped patient.  Eating breakfast.   12/21 Looks better today but still very weak.  Oxygen now down to 50L and 50% via HFNC.  Awake and eating breakfast.  Anxious at times.  Watches TV.   12/22: Patient seen and examined by me today.  He was complaining about chest pain but EKG found Q waves in inferior leads but no significant ST segment changes.  He remains on 50 L, 50% FiO2 of high flow nasal cannula.  Continue incentive spirometer and ambulation to wean off oxygen.  In addition, we will start Lantus to help control blood glucose better.   12/23: Patient continues to complain of chest pain..  CO depressions, ST elevations on EKG indicating pericarditis.  Limited cardiac echo ordered to evaluate for pericardial effusion.  In addition I started him on ibuprofen and Cardizem.  Patient did have increasing oxygen requirement today.   12/24: Overnight patient found to be septic protocol was started.  So far cultures have remained negative.  Likely source is a left-sided pneumonia found on chest x-ray.  This is likely cause of his pleuritic chest pain as well.  IV Unasyn and doxycycline has been started.  Since patient still spiking fevers and has lactic acidosis I will transition him to IV Zosyn.   12/25: Patient continues to complain of left-sided pleuritic pain.  Started lidocaine patch and prescribed ibuprofen.  He is taking shallow breaths and chest x-ray found bilateral atelectasis.  Encouraged incentive spirometry and ambulation.  He is having increased leukocytosis will continue IV antibiotics for now.  Cultures are negative to date.  12/26: Patient does have increased leukocytosis.  We will continue to trend this and get a procalcitonin level.  Patient states that his pain is much better controlled but he still taking shallow breaths.  I prescribed him hydrocodone cough syrup.  Blood cultures remain negative to  date.   12/27: Improving patient's hypoxia today and was found sitting in chair.  I have encouraged aggressive ISS and ambulation.  Patient states that his pain is improving and I encouraged him to take deep breaths.  Patient's procalcitonin is trending downwards.  improvement in patient's WBC count.  12/28: Patient had no acute issues today.  Transitioning from Zosyn to Augmentin.  Encouraged ISS and ambulation.  12/29: Mr. Hall was evaluated and examined in the ICU. He remains on high flow oxygen 70 liters and 90%. He has been up to side of bed though gets quite SOB with exertion. His appetite is poor. Glucose levels low hundreds.  12/30: patient seen and evaluated in the ICU. Blood sugars remain appropriate in the low to mid hundreds. He remains on high flow oxygen. His anxiety has been helped by Xanax.    Consultants/Specialty  Critical care    Code Status  Full Code    Disposition  Medical with high-flow capacity    Review of Systems  Review of Systems   Constitutional: Negative for chills and fever.   Respiratory: Positive for cough, sputum production and shortness of breath.    Cardiovascular: Negative for chest pain and leg swelling.   Gastrointestinal: Negative for nausea and vomiting.        Improved appetite   All other systems reviewed and are negative.       Physical Exam  Temp:  [36.6 °C (97.9 °F)-36.9 °C (98.4 °F)] 36.9 °C (98.4 °F)  Pulse:  [] 89  Resp:  [20-42] 30  BP: ()/(54-89) 126/89  SpO2:  [91 %-100 %] 97 %    Physical Exam  Vitals signs and nursing note reviewed.   Constitutional:       General: He is not in acute distress.     Appearance: He is ill-appearing. He is not toxic-appearing.   HENT:      Head: Normocephalic and atraumatic.      Mouth/Throat:      Mouth: Mucous membranes are dry.      Pharynx: Oropharynx is clear.   Eyes:      General: No scleral icterus.     Conjunctiva/sclera: Conjunctivae normal.   Neck:      Musculoskeletal: Normal range of motion and neck  supple.   Cardiovascular:      Comments: Distant regular heart sounds  Pulmonary:      Comments: High flow oxygen  Increased work of breathing  Few crackles otherwise clear lung sounds  Abdominal:      General: There is no distension.      Tenderness: There is no abdominal tenderness.   Musculoskeletal:      Right lower leg: No edema.      Left lower leg: No edema.   Skin:     General: Skin is warm and dry.   Neurological:      General: No focal deficit present.      Mental Status: He is alert and oriented to person, place, and time.   Psychiatric:         Mood and Affect: Mood normal.         Behavior: Behavior normal.         Fluids    Intake/Output Summary (Last 24 hours) at 12/30/2020 0822  Last data filed at 12/29/2020 1500  Gross per 24 hour   Intake 360 ml   Output 860 ml   Net -500 ml       Laboratory  Recent Labs     12/28/20  0515 12/29/20  0450   WBC 10.4 10.1   RBC 3.93* 3.78*   HEMOGLOBIN 11.5* 11.4*   HEMATOCRIT 36.5* 34.6*   MCV 92.9 91.5   MCH 29.3 30.2   MCHC 31.5* 32.9*   RDW 46.1 44.5   PLATELETCT 416 455*   MPV 9.4 9.4     Recent Labs     12/28/20  0515 12/29/20  0450   SODIUM 135 133*   POTASSIUM 4.1 3.6   CHLORIDE 100 98   CO2 28 24   GLUCOSE 109* 117*   BUN 13 9   CREATININE 0.74 0.55   CALCIUM 8.3* 8.6                   Imaging  EC-ECHOCARDIOGRAM LTD W/O CONT   Final Result      DX-CHEST-PORTABLE (1 VIEW)   Final Result      Persistent right greater left, consolidation consistent with Covid 19 pneumonia.      US-EXTREMITY VENOUS LOWER BILAT   Final Result      DX-CHEST-PORTABLE (1 VIEW)   Final Result      No significant change in Covid pneumonia.      US-EXTREMITY VENOUS LOWER BILAT   Final Result      EC-ECHOCARDIOGRAM LTD W/O CONT   Final Result      DX-CHEST-LIMITED (1 VIEW)   Final Result      No significant change in bilateral pneumonia.      DX-CHEST-LIMITED (1 VIEW)   Final Result         Persistent bilateral pulmonary opacities consistent with Covid 19 pneumonia.       DX-CHEST-PORTABLE (1 VIEW)   Final Result      Bilateral peripheral pulmonary airspace process highly suspicious for Covid pneumonia           Assessment/Plan  * Acute hypoxemic respiratory failure (HCC)- (present on admission)  Assessment & Plan  Secondary to COVID pneumonia superimposed to the patient with pneumonia  Status post course of 10 days Decadron  Encourage incentive spirometer, self proning, titrate oxygen as needed  COVID + as of 12/8    Pneumonia due to COVID-19 virus  Assessment & Plan  Tested COVID-19 positive on 12/8  He is still requiring high flow oxygen  S/p Decadron 6 mg p.o. daily until 12/17  S/p 5 days course of remdesivir with last dose 12/13.  continue incentive spirometer  Encourage proning and mobilization  Patient was treated with empiric IV antibiotics which were stopped  Ultrasound negative for DVT in bilateral lower extremities, echocardiogram reveals normal LVEF    Anxiety  Assessment & Plan  Ordered Xanax 0.25mg TIDprn   On escitalopram 10mg daily    Elevated d-dimer- (present on admission)  Assessment & Plan  12/16 LE US negative for DVT  Echo from 12/11 reviewed with no right heart failure.    Abnormal LFTs  Assessment & Plan  Suspect due to covid 19   Monitoring CMP (last checked 12/13)  12/17 LFTs showing improvement.  Likely viral etiology    Aspiration pneumonia (HCC)  Assessment & Plan  Found to have left sided infiltrate  Started on HCAP antibiotics including (Zosyn and doxycyline) and de-escalated to Augmentin/doxy (procalcitonin had been 5 after admit though went down to 0.4 on 12/26.  Negative blood cultures from 12/23  Respiratory care protocol   Placed on o2 therapy       Type 2 diabetes mellitus with hyperglycemia, without long-term current use of insulin ([facility])  Assessment & Plan  HbA1c 7.2, continue hypoglycemia protocol, accu-checks AC and HS and diabetic diet  Started lantus 11U for better BG control   Blood glucose low hundreds    Sepsis (HCC)  Assessment &  Plan  This is Severe Sepsis Present on admission  SIRS criteria identified on my evaluation include: Tachycardia, with heart rate greater than 90 BPM, Tachypnea, with respirations greater than 20 per minute and Leukocytosis, with WBC greater than 12,000  Source of infection is pulmonary  Clinical indicators of end organ dysfunction include Lactate >2 mmol/L (18.0 mg/dL)  Sepsis protocol initiated  Fluid resuscitation ordered per protocol  IV antibiotics as appropriate for source of sepsis  Reassessment: I have reassessed the patient's hemodynamic status  End organ dysfunction include(s):  Acute respiratory failure     Neuropathic pain- (present on admission)  Assessment & Plan  gabapentin       VTE prophylaxis: lovenox

## 2020-12-30 NOTE — THERAPY
Physical Therapy Contact Note    PT consult received and acknowledged. PT eval attempted, patient transferred off unit before eval completed.    Stefani Sales, PT, DPT  [phone]

## 2020-12-30 NOTE — PROGRESS NOTES
0844- bed obtained for pt transfer, called Richard Lamas RN to give report, all questions answered  0912- pt preped for transport, converted to NRB at 25lpm pt assisted to w/c and connected to portable monitor, pt sats remained >90% during transfer  0925- pt to room T 731.1 nurse in room with pt, transferred to their tele box and RT in the room to hook up high flow.

## 2020-12-31 LAB
GLUCOSE BLD-MCNC: 103 MG/DL (ref 65–99)
GLUCOSE BLD-MCNC: 112 MG/DL (ref 65–99)
GLUCOSE BLD-MCNC: 161 MG/DL (ref 65–99)
GLUCOSE BLD-MCNC: 204 MG/DL (ref 65–99)

## 2020-12-31 PROCEDURE — 99233 SBSQ HOSP IP/OBS HIGH 50: CPT | Performed by: STUDENT IN AN ORGANIZED HEALTH CARE EDUCATION/TRAINING PROGRAM

## 2020-12-31 PROCEDURE — 82962 GLUCOSE BLOOD TEST: CPT | Mod: 91

## 2020-12-31 PROCEDURE — 700102 HCHG RX REV CODE 250 W/ 637 OVERRIDE(OP): Performed by: HOSPITALIST

## 2020-12-31 PROCEDURE — 94760 N-INVAS EAR/PLS OXIMETRY 1: CPT

## 2020-12-31 PROCEDURE — 770020 HCHG ROOM/CARE - TELE (206)

## 2020-12-31 PROCEDURE — A9270 NON-COVERED ITEM OR SERVICE: HCPCS | Performed by: HOSPITALIST

## 2020-12-31 PROCEDURE — 94640 AIRWAY INHALATION TREATMENT: CPT

## 2020-12-31 PROCEDURE — 700101 HCHG RX REV CODE 250: Performed by: HOSPITALIST

## 2020-12-31 PROCEDURE — 700111 HCHG RX REV CODE 636 W/ 250 OVERRIDE (IP): Performed by: EMERGENCY MEDICINE

## 2020-12-31 PROCEDURE — A9270 NON-COVERED ITEM OR SERVICE: HCPCS | Performed by: INTERNAL MEDICINE

## 2020-12-31 PROCEDURE — 700102 HCHG RX REV CODE 250 W/ 637 OVERRIDE(OP): Performed by: INTERNAL MEDICINE

## 2020-12-31 RX ADMIN — OMEPRAZOLE 20 MG: 20 CAPSULE, DELAYED RELEASE ORAL at 05:09

## 2020-12-31 RX ADMIN — ESCITALOPRAM OXALATE 10 MG: 10 TABLET ORAL at 05:09

## 2020-12-31 RX ADMIN — INSULIN HUMAN 3 UNITS: 100 INJECTION, SOLUTION PARENTERAL at 21:42

## 2020-12-31 RX ADMIN — LIDOCAINE 1 PATCH: 50 PATCH TOPICAL at 12:27

## 2020-12-31 RX ADMIN — IBUPROFEN 400 MG: 800 TABLET, FILM COATED ORAL at 05:09

## 2020-12-31 RX ADMIN — IBUPROFEN 400 MG: 800 TABLET, FILM COATED ORAL at 17:53

## 2020-12-31 RX ADMIN — INSULIN HUMAN 4 UNITS: 100 INJECTION, SOLUTION PARENTERAL at 12:30

## 2020-12-31 RX ADMIN — IBUPROFEN 400 MG: 800 TABLET, FILM COATED ORAL at 12:27

## 2020-12-31 RX ADMIN — ENOXAPARIN SODIUM 40 MG: 40 INJECTION SUBCUTANEOUS at 05:09

## 2020-12-31 RX ADMIN — GABAPENTIN 400 MG: 400 CAPSULE ORAL at 05:09

## 2020-12-31 RX ADMIN — GABAPENTIN 400 MG: 400 CAPSULE ORAL at 17:53

## 2020-12-31 RX ADMIN — INSULIN GLARGINE 11 UNITS: 100 INJECTION, SOLUTION SUBCUTANEOUS at 21:45

## 2020-12-31 ASSESSMENT — ENCOUNTER SYMPTOMS
SPUTUM PRODUCTION: 1
NAUSEA: 0
CHILLS: 0
ROS GI COMMENTS: IMPROVED APPETITE
HEADACHES: 0
SHORTNESS OF BREATH: 1
VOMITING: 0
MYALGIAS: 0
COUGH: 1
FEVER: 0

## 2020-12-31 ASSESSMENT — PAIN SCALES - WONG BAKER: WONGBAKER_NUMERICALRESPONSE: DOESN'T HURT AT ALL

## 2020-12-31 ASSESSMENT — PAIN DESCRIPTION - PAIN TYPE: TYPE: ACUTE PAIN

## 2020-12-31 NOTE — PROGRESS NOTES
"Hospital Medicine Daily Progress Note    Date of Service  12/31/2020    Chief Complaint  55 y.o. male admitted 12/8/2020 with shortness of breath.    Hospital Course  \"55 y.o. male who presented 12/8/2020 with past medical history of chronic neuropathy who presents to the hospital for shortness of breath.  This patient states that his symptoms started about 9 days ago.  He initially started with symptoms of diarrhea and vomiting.  Also with some generalized body aches.  Subsequently started developing shortness of breath with cough congestion and sputum production.  Otherwise no known alleviating or exacerbating factors to his symptoms.  He did get tested for COVID-19 however did not follow-up the test results.  Shortness of breath has gradually worsened over the last several days and now is severe.     In the emergency department is found to be in respiratory distress and placed on high flow nasal cannula and was  admitted to the hospital service for further management of his symptoms\"    Interval Problem Update  \"12/15: On high flow NC (60L at 100%). On day 7 of 10 Decadron.  Tolerating diet.    12/16: Alert but ongoing respiratory distress.  Acute worsening this am with sinus tachycardia on telemetry and worsening oxygen saturation.  An ABG was requested and showed pH:7.46/pCO2:34.5/pO2:48.  I alerted Dr Mayo for possible need of bipap or intubation.   12/17: Still tenuous from a respiratory standpoint.  He is anxious at times and a bit scared.  Used  via iPad to communicate with him this morning.  He remains on 90% FiO2 with 60 L O2.   12/18: On 60L 100% and rapid shallow breathing.  Tearful at times.  Denied pain.  Able to talk in short sentences.  Discussed with Dr Mayo    12/19: Remains on high amounts of oxygen and very weak.  He states he is scared and nervous.  He remains on 60L and 100% via HFNC. I have called his sister and updated her on her brother's status.   12/20 Less respiratory " distress.  Watching TV.  Some episodes on anxiety and RN states the antivan the other day helped patient.  Eating breakfast.   12/21 Looks better today but still very weak.  Oxygen now down to 50L and 50% via HFNC.  Awake and eating breakfast.  Anxious at times.  Watches TV.   12/22: Patient seen and examined by me today.  He was complaining about chest pain but EKG found Q waves in inferior leads but no significant ST segment changes.  He remains on 50 L, 50% FiO2 of high flow nasal cannula.  Continue incentive spirometer and ambulation to wean off oxygen.  In addition, we will start Lantus to help control blood glucose better.   12/23: Patient continues to complain of chest pain..  SD depressions, ST elevations on EKG indicating pericarditis.  Limited cardiac echo ordered to evaluate for pericardial effusion.  In addition I started him on ibuprofen and Cardizem.  Patient did have increasing oxygen requirement today.   12/24: Overnight patient found to be septic protocol was started.  So far cultures have remained negative.  Likely source is a left-sided pneumonia found on chest x-ray.  This is likely cause of his pleuritic chest pain as well.  IV Unasyn and doxycycline has been started.  Since patient still spiking fevers and has lactic acidosis I will transition him to IV Zosyn.   12/25: Patient continues to complain of left-sided pleuritic pain.  Started lidocaine patch and prescribed ibuprofen.  He is taking shallow breaths and chest x-ray found bilateral atelectasis.  Encouraged incentive spirometry and ambulation.  He is having increased leukocytosis will continue IV antibiotics for now.  Cultures are negative to date.  12/26: Patient does have increased leukocytosis.  We will continue to trend this and get a procalcitonin level.  Patient states that his pain is much better controlled but he still taking shallow breaths.  I prescribed him hydrocodone cough syrup.  Blood cultures remain negative to  "date.   12/27: Improving patient's hypoxia today and was found sitting in chair.  I have encouraged aggressive ISS and ambulation.  Patient states that his pain is improving and I encouraged him to take deep breaths.  Patient's procalcitonin is trending downwards.  improvement in patient's WBC count.  12/28: Patient had no acute issues today.  Transitioning from Zosyn to Augmentin.  Encouraged ISS and ambulation.  12/29: Mr. Hall was evaluated and examined in the ICU. He remains on high flow oxygen 70 liters and 90%. He has been up to side of bed though gets quite SOB with exertion. His appetite is poor. Glucose levels low hundreds.  12/30: patient seen and evaluated in the ICU. Blood sugars remain appropriate in the low to mid hundreds. He remains on high flow oxygen. His anxiety has been helped by Xanax.\"    12/31: Patient transferred from ICU overnight.  Remains on HFNC.  No acute complaints this morning.      Consultants/Specialty  Critical care    Code Status  Full Code    Disposition  Medical with high-flow capacity    Review of Systems  Review of Systems   Constitutional: Negative for chills and fever.   Respiratory: Positive for cough (improving), sputum production (improving) and shortness of breath (improving).    Cardiovascular: Negative for chest pain and leg swelling.   Gastrointestinal: Negative for nausea and vomiting.        Improved appetite   Musculoskeletal: Negative for myalgias.   Neurological: Negative for headaches.   All other systems reviewed and are negative.       Physical Exam  Temp:  [35.8 °C (96.5 °F)-36.7 °C (98.1 °F)] 35.8 °C (96.5 °F)  Pulse:  [] 105  Resp:  [22-34] 22  BP: (105-118)/(72-79) 109/78  SpO2:  [90 %-99 %] 90 %    Physical Exam  Vitals signs and nursing note reviewed.   Constitutional:       General: He is not in acute distress.     Appearance: He is not toxic-appearing.   HENT:      Head: Normocephalic and atraumatic.      Mouth/Throat:      Mouth: Mucous " membranes are dry.      Pharynx: Oropharynx is clear.   Eyes:      General: No scleral icterus.     Conjunctiva/sclera: Conjunctivae normal.   Neck:      Musculoskeletal: Normal range of motion and neck supple.   Cardiovascular:      Rate and Rhythm: Normal rate and regular rhythm.      Comments: Distant regular heart sounds  Pulmonary:      Breath sounds: Rales present.      Comments: On HFNC.   Increased work of breathing is improving.    Minimal rales b/l.   Abdominal:      General: There is no distension.      Tenderness: There is no abdominal tenderness.   Musculoskeletal:      Right lower leg: No edema.      Left lower leg: No edema.   Skin:     General: Skin is warm and dry.   Neurological:      General: No focal deficit present.      Mental Status: He is alert and oriented to person, place, and time.   Psychiatric:         Mood and Affect: Mood normal.         Behavior: Behavior normal.         Fluids    Intake/Output Summary (Last 24 hours) at 12/31/2020 1050  Last data filed at 12/31/2020 0003  Gross per 24 hour   Intake --   Output 600 ml   Net -600 ml       Laboratory  Recent Labs     12/29/20  0450   WBC 10.1   RBC 3.78*   HEMOGLOBIN 11.4*   HEMATOCRIT 34.6*   MCV 91.5   MCH 30.2   MCHC 32.9*   RDW 44.5   PLATELETCT 455*   MPV 9.4     Recent Labs     12/29/20  0450   SODIUM 133*   POTASSIUM 3.6   CHLORIDE 98   CO2 24   GLUCOSE 117*   BUN 9   CREATININE 0.55   CALCIUM 8.6                   Imaging  EC-ECHOCARDIOGRAM LTD W/O CONT   Final Result      DX-CHEST-PORTABLE (1 VIEW)   Final Result      Persistent right greater left, consolidation consistent with Covid 19 pneumonia.      US-EXTREMITY VENOUS LOWER BILAT   Final Result      DX-CHEST-PORTABLE (1 VIEW)   Final Result      No significant change in Covid pneumonia.      US-EXTREMITY VENOUS LOWER BILAT   Final Result      EC-ECHOCARDIOGRAM LTD W/O CONT   Final Result      DX-CHEST-LIMITED (1 VIEW)   Final Result      No significant change in bilateral  pneumonia.      DX-CHEST-LIMITED (1 VIEW)   Final Result         Persistent bilateral pulmonary opacities consistent with Covid 19 pneumonia.      DX-CHEST-PORTABLE (1 VIEW)   Final Result      Bilateral peripheral pulmonary airspace process highly suspicious for Covid pneumonia           Assessment/Plan  * Acute hypoxemic respiratory failure (HCC)- (present on admission)  Assessment & Plan  Secondary to COVID pneumonia superimposed to the patient with pneumonia  Status post course of 10 days Decadron  Encourage incentive spirometer, self proning, titrate oxygen as needed  COVID + as of 12/8    Pneumonia due to COVID-19 virus  Assessment & Plan  Tested COVID-19 positive on 12/8  Remains on high flow oxygen; wean as tolerated  S/p Decadron 6 mg p.o. daily until 12/17  S/p 5 days course of remdesivir with last dose 12/13.  continue incentive spirometer  Encourage proning and mobilization  Patient was treated with empiric IV antibiotics which were stopped  Ultrasound negative for DVT in bilateral lower extremities, echocardiogram reveals normal LVEF    Anxiety  Assessment & Plan  Ordered Xanax 0.25mg TIDprn   On escitalopram 10mg daily    Elevated d-dimer- (present on admission)  Assessment & Plan  12/16 LE US negative for DVT  Echo from 12/11 reviewed with no right heart failure.    Abnormal LFTs  Assessment & Plan  Suspect due to covid 19   Monitoring CMP (last checked 12/13)  12/17 LFTs showing improvement.  Likely viral etiology    Aspiration pneumonia (HCC)  Assessment & Plan  Found to have left sided infiltrate  Started on HCAP antibiotics including (Zosyn and doxycyline) and de-escalated to Augmentin/doxy (procalcitonin had been 5 after admit though went down to 0.4 on 12/26.  Completed abx course on 12/30.   Negative blood cultures from 12/23  Respiratory care protocol   Placed on o2 therapy     Type 2 diabetes mellitus with hyperglycemia, without long-term current use of insulin (HCC)  Assessment & Plan  HbA1c  7.2, continue hypoglycemia protocol, accu-checks AC and HS and diabetic diet  Started lantus 11U for better BG control   Blood glucose low hundreds    Sepsis (HCC)  Assessment & Plan  This is Severe Sepsis Present on admission  SIRS criteria identified on my evaluation include: Tachycardia, with heart rate greater than 90 BPM, Tachypnea, with respirations greater than 20 per minute and Leukocytosis, with WBC greater than 12,000  Source of infection is pulmonary  Clinical indicators of end organ dysfunction include Lactate >2 mmol/L (18.0 mg/dL)  Sepsis protocol initiated  Fluid resuscitation ordered per protocol  IV antibiotics as appropriate for source of sepsis  Reassessment: I have reassessed the patient's hemodynamic status  End organ dysfunction include(s):  Acute respiratory failure     Neuropathic pain- (present on admission)  Assessment & Plan  gabapentin       VTE prophylaxis: lovenox

## 2020-12-31 NOTE — PROGRESS NOTES
Bedside report received from day nurse. Assumed care of patient. Pt. resting comfortably without any sign of distress. A&Ox4, on 40L at 50% high flow oxygen, no complaints at this time. POC reviewed and white board updated, Tele box on, Call light in reach, Bed locked in lowest position.

## 2020-12-31 NOTE — CARE PLAN
Problem: Nutritional:  Goal: Achieve adequate nutritional intake  Description: Patient will consume 50% of meals + supplements  Outcome: PROGRESSING AS EXPECTED    Pt has documentation for eating variably ranging <25% to 75% evenly and 25-50% of 1 boost shake. Discussed with RN, pt drank Boost glucose at breakfast and ate a little (~25%). Continue current diet/supplements as tolerated. Encourage PO intake as able.

## 2021-01-01 LAB
ANION GAP SERPL CALC-SCNC: 11 MMOL/L (ref 7–16)
BUN SERPL-MCNC: 12 MG/DL (ref 8–22)
CALCIUM SERPL-MCNC: 9.1 MG/DL (ref 8.5–10.5)
CHLORIDE SERPL-SCNC: 98 MMOL/L (ref 96–112)
CO2 SERPL-SCNC: 26 MMOL/L (ref 20–33)
CREAT SERPL-MCNC: 0.61 MG/DL (ref 0.5–1.4)
ERYTHROCYTE [DISTWIDTH] IN BLOOD BY AUTOMATED COUNT: 45.5 FL (ref 35.9–50)
GLUCOSE BLD-MCNC: 113 MG/DL (ref 65–99)
GLUCOSE BLD-MCNC: 143 MG/DL (ref 65–99)
GLUCOSE BLD-MCNC: 160 MG/DL (ref 65–99)
GLUCOSE SERPL-MCNC: 128 MG/DL (ref 65–99)
HCT VFR BLD AUTO: 37.5 % (ref 42–52)
HGB BLD-MCNC: 12.1 G/DL (ref 14–18)
MCH RBC QN AUTO: 29.9 PG (ref 27–33)
MCHC RBC AUTO-ENTMCNC: 32.3 G/DL (ref 33.7–35.3)
MCV RBC AUTO: 92.6 FL (ref 81.4–97.8)
PLATELET # BLD AUTO: 714 K/UL (ref 164–446)
PMV BLD AUTO: 9 FL (ref 9–12.9)
POTASSIUM SERPL-SCNC: 3.9 MMOL/L (ref 3.6–5.5)
RBC # BLD AUTO: 4.05 M/UL (ref 4.7–6.1)
SODIUM SERPL-SCNC: 135 MMOL/L (ref 135–145)
WBC # BLD AUTO: 9 K/UL (ref 4.8–10.8)

## 2021-01-01 PROCEDURE — 94640 AIRWAY INHALATION TREATMENT: CPT

## 2021-01-01 PROCEDURE — A9270 NON-COVERED ITEM OR SERVICE: HCPCS | Performed by: HOSPITALIST

## 2021-01-01 PROCEDURE — 700102 HCHG RX REV CODE 250 W/ 637 OVERRIDE(OP): Performed by: STUDENT IN AN ORGANIZED HEALTH CARE EDUCATION/TRAINING PROGRAM

## 2021-01-01 PROCEDURE — 99233 SBSQ HOSP IP/OBS HIGH 50: CPT | Performed by: STUDENT IN AN ORGANIZED HEALTH CARE EDUCATION/TRAINING PROGRAM

## 2021-01-01 PROCEDURE — 80048 BASIC METABOLIC PNL TOTAL CA: CPT

## 2021-01-01 PROCEDURE — A9270 NON-COVERED ITEM OR SERVICE: HCPCS | Performed by: STUDENT IN AN ORGANIZED HEALTH CARE EDUCATION/TRAINING PROGRAM

## 2021-01-01 PROCEDURE — A9270 NON-COVERED ITEM OR SERVICE: HCPCS | Performed by: INTERNAL MEDICINE

## 2021-01-01 PROCEDURE — 94760 N-INVAS EAR/PLS OXIMETRY 1: CPT

## 2021-01-01 PROCEDURE — 700102 HCHG RX REV CODE 250 W/ 637 OVERRIDE(OP): Performed by: HOSPITALIST

## 2021-01-01 PROCEDURE — 700111 HCHG RX REV CODE 636 W/ 250 OVERRIDE (IP): Performed by: EMERGENCY MEDICINE

## 2021-01-01 PROCEDURE — 770020 HCHG ROOM/CARE - TELE (206)

## 2021-01-01 PROCEDURE — 36415 COLL VENOUS BLD VENIPUNCTURE: CPT

## 2021-01-01 PROCEDURE — 700101 HCHG RX REV CODE 250: Performed by: HOSPITALIST

## 2021-01-01 PROCEDURE — 82962 GLUCOSE BLOOD TEST: CPT | Mod: 91

## 2021-01-01 PROCEDURE — 97161 PT EVAL LOW COMPLEX 20 MIN: CPT

## 2021-01-01 PROCEDURE — 700102 HCHG RX REV CODE 250 W/ 637 OVERRIDE(OP): Performed by: INTERNAL MEDICINE

## 2021-01-01 PROCEDURE — 97530 THERAPEUTIC ACTIVITIES: CPT

## 2021-01-01 PROCEDURE — 85027 COMPLETE CBC AUTOMATED: CPT

## 2021-01-01 RX ADMIN — ESCITALOPRAM OXALATE 10 MG: 10 TABLET ORAL at 05:21

## 2021-01-01 RX ADMIN — GABAPENTIN 400 MG: 400 CAPSULE ORAL at 16:42

## 2021-01-01 RX ADMIN — INSULIN HUMAN 3 UNITS: 100 INJECTION, SOLUTION PARENTERAL at 16:42

## 2021-01-01 RX ADMIN — INSULIN GLARGINE 11 UNITS: 100 INJECTION, SOLUTION SUBCUTANEOUS at 16:42

## 2021-01-01 RX ADMIN — IBUPROFEN 400 MG: 800 TABLET, FILM COATED ORAL at 12:00

## 2021-01-01 RX ADMIN — OMEPRAZOLE 20 MG: 20 CAPSULE, DELAYED RELEASE ORAL at 05:21

## 2021-01-01 RX ADMIN — ALPRAZOLAM 0.25 MG: 0.25 TABLET ORAL at 19:47

## 2021-01-01 RX ADMIN — GABAPENTIN 400 MG: 400 CAPSULE ORAL at 05:21

## 2021-01-01 RX ADMIN — LIDOCAINE 1 PATCH: 50 PATCH TOPICAL at 12:22

## 2021-01-01 RX ADMIN — IBUPROFEN 400 MG: 800 TABLET, FILM COATED ORAL at 05:21

## 2021-01-01 RX ADMIN — ENOXAPARIN SODIUM 40 MG: 40 INJECTION SUBCUTANEOUS at 05:21

## 2021-01-01 ASSESSMENT — ENCOUNTER SYMPTOMS
CHILLS: 0
MYALGIAS: 0
COUGH: 1
FEVER: 0
VOMITING: 0
HEADACHES: 0
SPUTUM PRODUCTION: 1
SHORTNESS OF BREATH: 1
NAUSEA: 0
ROS GI COMMENTS: IMPROVED APPETITE

## 2021-01-01 ASSESSMENT — GAIT ASSESSMENTS: GAIT LEVEL OF ASSIST: UNABLE TO PARTICIPATE

## 2021-01-01 ASSESSMENT — COGNITIVE AND FUNCTIONAL STATUS - GENERAL
WALKING IN HOSPITAL ROOM: A LITTLE
DRESSING REGULAR LOWER BODY CLOTHING: A LITTLE
HELP NEEDED FOR BATHING: A LITTLE
MOBILITY SCORE: 19
DAILY ACTIVITIY SCORE: 20
SUGGESTED CMS G CODE MODIFIER DAILY ACTIVITY: CJ
EATING MEALS: A LITTLE
SUGGESTED CMS G CODE MODIFIER MOBILITY: CK
MOVING FROM LYING ON BACK TO SITTING ON SIDE OF FLAT BED: A LITTLE
TOILETING: A LITTLE
STANDING UP FROM CHAIR USING ARMS: A LITTLE
CLIMB 3 TO 5 STEPS WITH RAILING: A LOT

## 2021-01-01 ASSESSMENT — PAIN DESCRIPTION - PAIN TYPE: TYPE: ACUTE PAIN

## 2021-01-01 NOTE — THERAPY
Physical Therapy   Initial Evaluation     Patient Name: Singh Hall  Age:  55 y.o., Sex:  male  Medical Record #: [id number]  Today's Date: 1/1/2021     Precautions: Fall Risk, HFNC (quick O2 desat with minimal exertion)    Assessment  Patient is 55 y.o. male presenting acutely on 12/8 with SOB and found to be COVID + at that time. PMH includes chronic neuropathy.  utilized throughout assessment for translation to Vietnamese. Pt with good strength for fxnl mob, primary impairment is very poor activity tolerance due to quick O2 desat with minimal exertion. Pt able to perform bed mob and transfer with SPV no physical assistance required. He demonstrated O2 desat to low 80s with transfer requiring seated rest to recover. Pt provided extensive education regarding monitoring SpO2 and HR with activity and need for OOB activity to improve endurance. Pt with poor carryover after education reporting fatigue and requesting to return BTB. Unable to assess gait or stair negotiation due to O2 desat with transfer and short line of HFNC. Pt has adequate strength to negotiate household distances however given very poor activity tolerance recommend post-acute placement. Anticipate progress if activity tolerance builds that pt may be able to DC home with HHPT. PT to follow.     Plan    Recommend Physical Therapy 2 times per week until therapy goals are met for the following treatments:  Community Re-integration, Gait Training, Neuro Re-Education / Balance, Stair Training, Therapeutic Activities and Therapeutic Exercises    DC Equipment Recommendations: Unable to determine at this time  Discharge Recommendations: Other - Pt has adequate strength to negotiate household distances however given very poor activity tolerance recommend post-acute placement. Anticipate progress if activity tolerance builds that pt may be able to DC home with HHPT.      Objective     01/01/21 0940   Vitals   Pulse Oximetry (!) 83 % (with transfer to  bedside chair)   O2 (LPM) 40   O2 Delivery Device Heated High Flow Nasal Cannula   Vitals Comments Pt requiring seated rest to recover SpO2 >88-90% on 40LPM after transfer   Prior Living Situation   Prior Services None   Housing / Facility 1 Story Apartment / Condo   Steps Into Home 1   Steps In Home 0   Equipment Owned None   Lives with - Patient's Self Care Capacity Parents (Father, has sisters that may be able to provide assist)   Prior Level of Functional Mobility   Bed Mobility Independent   Transfer Status Independent   Ambulation Independent   Distance Ambulation (Feet) (Community distances)   Assistive Devices Used None   Stairs Independent   Cognition    Level of Consciousness Alert   New Learning Impaired   Attention Impaired   Comments anxious regarding mob/WOB, poor carryover of new learning regarding mobility, pt reporting significant fatigue   Strength Lower Body   Lower Body Strength  WDL   Balance Assessment   Sitting Balance (Static) Fair +   Sitting Balance (Dynamic) Fair +   Standing Balance (Static) Fair   Standing Balance (Dynamic) Fair -   Gait Analysis   Gait Level Of Assist Unable to Participate (2/2 O2 desat with transfer, HFNC line)   Weight Bearing Status No restrictions   Bed Mobility    Supine to Sit Supervised   Sit to Supine (up in chair post session)   Functional Mobility   Sit to Stand Supervised   Bed, Chair, Wheelchair Transfer Supervised (SBA)   Short Term Goals    Short Term Goal # 1 Pt will amb x50ft with SPV while negotiating supplemental O2 tank (if needed for DC) within 6 visits to [facility] after DC.   Short Term Goal # 2 Pt will ascend/descend 1 step with SPV within 6 visits to enter/exit home at DC.

## 2021-01-01 NOTE — DOCUMENTATION QUERY
[facility]                                                                       Query Response Note      PATIENT:               WARD EUBANKS  ACCT #:                  [id number]  MRN:                     [id number]  :                      1965  ADMIT DATE:       2020 3:25 PM  DISCH DATE:          RESPONDING  PROVIDER #:        [id number]           QUERY TEXT:    Pressure injury  is documented  by the Wound Care RN on  & .    Please specify if you:    NOTE:  If an appropriate response is not listed below, please respond with a new note.      The patient's Clinical Indicators include:  Per wound care evaluations  Pressure Injury Ear Right R ear unstageable; not POA   Pressure Injury Ear Left L ear sDTI; not POA    Treatment- Wound care therapy; offloading; NS irrigation and skin protectant wipes; padded bilateral 02 mask  straps    Risks: prolonged hospitalization; 02 masks    Thank You,  Luana Bennett RN, CCDS, CDIP, CCS  Clinical  Lead  Connect via Voalte Messenger  Options provided:   -- Agree with Wound Care RN assessment   -- Disagree with Wound Care RN assessment   -- Unable to determine      Query created by: Luana Bennett on 2020 10:42 AM    RESPONSE TEXT:    Agree with Wound Care RN assessment          Electronically signed by:  MAURY MCCLAIN MD 2020 6:35 PM

## 2021-01-01 NOTE — THERAPY
Occupational Therapy  Daily Treatment     Patient Name: Singh Hall  Age:  55 y.o., Sex:  male  Medical Record #: [id number]  Today's Date: 1/1/2021     Precautions  Precautions: Fall Risk(quick O2 desat with minimal exertion)  Comments: O2 sats drop with activity & has increased WOB    Assessment     Pt currently limited by decreased functional mobility, activity tolerance, balance, limited O2 capacity which are affecting pt's ability to complete ADLs/IADLs at baseline. Pt would benefit from OT services in the acute care setting to maximize functional recovery.     Plan    Continue current treatment plan.    DC Equipment Recommendations: Unable to determine at this time  Discharge Recommendations: (P) Recommend home health for continued occupational therapy services       01/01/21 0912   Activities of Daily Living   Grooming Supervision   Upper Body Dressing Supervision   Lower Body Dressing Minimal Assist   Toileting Minimal Assist   Functional Mobility   Sit to Stand Supervised   Bed, Chair, Wheelchair Transfer   (SBA)   Toilet Transfers Minimal Assist   Short Term Goals   Short Term Goal # 1 Pt will maintain O2 sats >90% during ADL's   Goal Outcome # 1 Progressing as expected   Short Term Goal # 2 Pt will amb to bathroom with supervision   Goal Outcome # 2 Progressing slower than expected   Short Term Goal # 3 Pt will dress LB with supervision   Goal Outcome # 3 Progressing slower than expected        01/01/21 0912   Activities of Daily Living   Grooming Supervision   Upper Body Dressing Supervision   Lower Body Dressing Minimal Assist   Toileting Minimal Assist   Functional Mobility   Sit to Stand Supervised   Bed, Chair, Wheelchair Transfer   (SBA)   Toilet Transfers Minimal Assist   Short Term Goals   Short Term Goal # 1 Pt will maintain O2 sats >90% during ADL's   Goal Outcome # 1 Progressing as expected   Short Term Goal # 2 Pt will amb to bathroom with supervision   Goal Outcome # 2 Progressing slower than  expected   Short Term Goal # 3 Pt will dress LB with supervision   Goal Outcome # 3 Progressing slower than expected

## 2021-01-01 NOTE — PROGRESS NOTES
Bedside report completed and  Assumed care 1930. Pt lying in bed comfortably. Pt on [place] at the moment at 40L and FiO2 50. Assessment was done with help of interpretor    A/O x 4, pain 2/10 located in chest wall, bed alarm is on. Safety precautions in place. Call light and personal belongings within reach. Pt educated on POC and all questions answered at this time.  Educated patient on calling for assistance when needed. All pt needs are met at this time.  Will continue to monitor.

## 2021-01-02 LAB
ANION GAP SERPL CALC-SCNC: 10 MMOL/L (ref 7–16)
BASOPHILS # BLD AUTO: 0.8 % (ref 0–1.8)
BASOPHILS # BLD: 0.07 K/UL (ref 0–0.12)
BUN SERPL-MCNC: 13 MG/DL (ref 8–22)
CALCIUM SERPL-MCNC: 9.2 MG/DL (ref 8.5–10.5)
CHLORIDE SERPL-SCNC: 97 MMOL/L (ref 96–112)
CO2 SERPL-SCNC: 28 MMOL/L (ref 20–33)
CREAT SERPL-MCNC: 0.63 MG/DL (ref 0.5–1.4)
EOSINOPHIL # BLD AUTO: 0.86 K/UL (ref 0–0.51)
EOSINOPHIL NFR BLD: 9.4 % (ref 0–6.9)
ERYTHROCYTE [DISTWIDTH] IN BLOOD BY AUTOMATED COUNT: 44.7 FL (ref 35.9–50)
GLUCOSE BLD-MCNC: 112 MG/DL (ref 65–99)
GLUCOSE BLD-MCNC: 127 MG/DL (ref 65–99)
GLUCOSE BLD-MCNC: 133 MG/DL (ref 65–99)
GLUCOSE BLD-MCNC: 150 MG/DL (ref 65–99)
GLUCOSE SERPL-MCNC: 110 MG/DL (ref 65–99)
HCT VFR BLD AUTO: 37.9 % (ref 42–52)
HGB BLD-MCNC: 12 G/DL (ref 14–18)
IMM GRANULOCYTES # BLD AUTO: 0.21 K/UL (ref 0–0.11)
IMM GRANULOCYTES NFR BLD AUTO: 2.3 % (ref 0–0.9)
LYMPHOCYTES # BLD AUTO: 2.78 K/UL (ref 1–4.8)
LYMPHOCYTES NFR BLD: 30.4 % (ref 22–41)
MCH RBC QN AUTO: 29.1 PG (ref 27–33)
MCHC RBC AUTO-ENTMCNC: 31.7 G/DL (ref 33.7–35.3)
MCV RBC AUTO: 92 FL (ref 81.4–97.8)
MONOCYTES # BLD AUTO: 0.99 K/UL (ref 0–0.85)
MONOCYTES NFR BLD AUTO: 10.8 % (ref 0–13.4)
NEUTROPHILS # BLD AUTO: 4.22 K/UL (ref 1.82–7.42)
NEUTROPHILS NFR BLD: 46.3 % (ref 44–72)
NRBC # BLD AUTO: 0 K/UL
NRBC BLD-RTO: 0 /100 WBC
PLATELET # BLD AUTO: 813 K/UL (ref 164–446)
PMV BLD AUTO: 9.1 FL (ref 9–12.9)
POTASSIUM SERPL-SCNC: 3.9 MMOL/L (ref 3.6–5.5)
RBC # BLD AUTO: 4.12 M/UL (ref 4.7–6.1)
SODIUM SERPL-SCNC: 135 MMOL/L (ref 135–145)
WBC # BLD AUTO: 9.1 K/UL (ref 4.8–10.8)

## 2021-01-02 PROCEDURE — 80048 BASIC METABOLIC PNL TOTAL CA: CPT

## 2021-01-02 PROCEDURE — 94640 AIRWAY INHALATION TREATMENT: CPT

## 2021-01-02 PROCEDURE — A9270 NON-COVERED ITEM OR SERVICE: HCPCS | Performed by: HOSPITALIST

## 2021-01-02 PROCEDURE — 700101 HCHG RX REV CODE 250: Performed by: HOSPITALIST

## 2021-01-02 PROCEDURE — 36415 COLL VENOUS BLD VENIPUNCTURE: CPT

## 2021-01-02 PROCEDURE — 770020 HCHG ROOM/CARE - TELE (206)

## 2021-01-02 PROCEDURE — 700111 HCHG RX REV CODE 636 W/ 250 OVERRIDE (IP): Performed by: EMERGENCY MEDICINE

## 2021-01-02 PROCEDURE — 700102 HCHG RX REV CODE 250 W/ 637 OVERRIDE(OP): Performed by: HOSPITALIST

## 2021-01-02 PROCEDURE — A9270 NON-COVERED ITEM OR SERVICE: HCPCS | Performed by: INTERNAL MEDICINE

## 2021-01-02 PROCEDURE — 700102 HCHG RX REV CODE 250 W/ 637 OVERRIDE(OP): Performed by: INTERNAL MEDICINE

## 2021-01-02 PROCEDURE — 700102 HCHG RX REV CODE 250 W/ 637 OVERRIDE(OP): Performed by: STUDENT IN AN ORGANIZED HEALTH CARE EDUCATION/TRAINING PROGRAM

## 2021-01-02 PROCEDURE — 82962 GLUCOSE BLOOD TEST: CPT | Mod: 91

## 2021-01-02 PROCEDURE — 85025 COMPLETE CBC W/AUTO DIFF WBC: CPT

## 2021-01-02 PROCEDURE — A9270 NON-COVERED ITEM OR SERVICE: HCPCS | Performed by: STUDENT IN AN ORGANIZED HEALTH CARE EDUCATION/TRAINING PROGRAM

## 2021-01-02 PROCEDURE — 99233 SBSQ HOSP IP/OBS HIGH 50: CPT | Performed by: STUDENT IN AN ORGANIZED HEALTH CARE EDUCATION/TRAINING PROGRAM

## 2021-01-02 RX ADMIN — ENOXAPARIN SODIUM 40 MG: 40 INJECTION SUBCUTANEOUS at 05:21

## 2021-01-02 RX ADMIN — IBUPROFEN 400 MG: 800 TABLET, FILM COATED ORAL at 17:13

## 2021-01-02 RX ADMIN — INSULIN GLARGINE 11 UNITS: 100 INJECTION, SOLUTION SUBCUTANEOUS at 17:13

## 2021-01-02 RX ADMIN — ESCITALOPRAM OXALATE 10 MG: 10 TABLET ORAL at 05:21

## 2021-01-02 RX ADMIN — ALPRAZOLAM 0.25 MG: 0.25 TABLET ORAL at 22:14

## 2021-01-02 RX ADMIN — GABAPENTIN 400 MG: 400 CAPSULE ORAL at 05:21

## 2021-01-02 RX ADMIN — IBUPROFEN 400 MG: 800 TABLET, FILM COATED ORAL at 06:00

## 2021-01-02 RX ADMIN — LIDOCAINE 1 PATCH: 50 PATCH TOPICAL at 12:18

## 2021-01-02 RX ADMIN — INSULIN HUMAN 3 UNITS: 100 INJECTION, SOLUTION PARENTERAL at 22:47

## 2021-01-02 RX ADMIN — GABAPENTIN 400 MG: 400 CAPSULE ORAL at 17:13

## 2021-01-02 RX ADMIN — IBUPROFEN 400 MG: 800 TABLET, FILM COATED ORAL at 12:18

## 2021-01-02 RX ADMIN — OMEPRAZOLE 20 MG: 20 CAPSULE, DELAYED RELEASE ORAL at 06:00

## 2021-01-02 ASSESSMENT — ENCOUNTER SYMPTOMS
FEVER: 0
VOMITING: 0
ROS GI COMMENTS: IMPROVED APPETITE
HEADACHES: 0
COUGH: 1
SHORTNESS OF BREATH: 1
CHILLS: 0
SPUTUM PRODUCTION: 1
MYALGIAS: 0
NAUSEA: 0

## 2021-01-02 ASSESSMENT — PAIN DESCRIPTION - PAIN TYPE: TYPE: ACUTE PAIN

## 2021-01-02 NOTE — PROGRESS NOTES
Bedside report received from day RN. Assumed care of pt at 1900. Pt is on 40 LPM and 50 FIO2 of O2 . Pt has tele box in place. No s/s of pain or discomfort.Assessment completed with intepreter  Educated to call light. Bed is in low and locked position. Will continue to monitor.

## 2021-01-02 NOTE — PROGRESS NOTES
"Hospital Medicine Daily Progress Note    Date of Service  1/1/2021    Chief Complaint  55 y.o. male admitted 12/8/2020 with shortness of breath.    Hospital Course  \"55 y.o. male who presented 12/8/2020 with past medical history of chronic neuropathy who presents to the hospital for shortness of breath.  This patient states that his symptoms started about 9 days ago.  He initially started with symptoms of diarrhea and vomiting.  Also with some generalized body aches.  Subsequently started developing shortness of breath with cough congestion and sputum production.  Otherwise no known alleviating or exacerbating factors to his symptoms.  He did get tested for COVID-19 however did not follow-up the test results.  Shortness of breath has gradually worsened over the last several days and now is severe.     In the emergency department is found to be in respiratory distress and placed on high flow nasal cannula and was  admitted to the hospital service for further management of his symptoms\"    Interval Problem Update  \"12/15: On high flow NC (60L at 100%). On day 7 of 10 Decadron.  Tolerating diet.    12/16: Alert but ongoing respiratory distress.  Acute worsening this am with sinus tachycardia on telemetry and worsening oxygen saturation.  An ABG was requested and showed pH:7.46/pCO2:34.5/pO2:48.  I alerted Dr Mayo for possible need of bipap or intubation.   12/17: Still tenuous from a respiratory standpoint.  He is anxious at times and a bit scared.  Used  via iPad to communicate with him this morning.  He remains on 90% FiO2 with 60 L O2.   12/18: On 60L 100% and rapid shallow breathing.  Tearful at times.  Denied pain.  Able to talk in short sentences.  Discussed with Dr Mayo    12/19: Remains on high amounts of oxygen and very weak.  He states he is scared and nervous.  He remains on 60L and 100% via HFNC. I have called his sister and updated her on her brother's status.   12/20 Less respiratory " distress.  Watching TV.  Some episodes on anxiety and RN states the antivan the other day helped patient.  Eating breakfast.   12/21 Looks better today but still very weak.  Oxygen now down to 50L and 50% via HFNC.  Awake and eating breakfast.  Anxious at times.  Watches TV.   12/22: Patient seen and examined by me today.  He was complaining about chest pain but EKG found Q waves in inferior leads but no significant ST segment changes.  He remains on 50 L, 50% FiO2 of high flow nasal cannula.  Continue incentive spirometer and ambulation to wean off oxygen.  In addition, we will start Lantus to help control blood glucose better.   12/23: Patient continues to complain of chest pain..  NE depressions, ST elevations on EKG indicating pericarditis.  Limited cardiac echo ordered to evaluate for pericardial effusion.  In addition I started him on ibuprofen and Cardizem.  Patient did have increasing oxygen requirement today.   12/24: Overnight patient found to be septic protocol was started.  So far cultures have remained negative.  Likely source is a left-sided pneumonia found on chest x-ray.  This is likely cause of his pleuritic chest pain as well.  IV Unasyn and doxycycline has been started.  Since patient still spiking fevers and has lactic acidosis I will transition him to IV Zosyn.   12/25: Patient continues to complain of left-sided pleuritic pain.  Started lidocaine patch and prescribed ibuprofen.  He is taking shallow breaths and chest x-ray found bilateral atelectasis.  Encouraged incentive spirometry and ambulation.  He is having increased leukocytosis will continue IV antibiotics for now.  Cultures are negative to date.  12/26: Patient does have increased leukocytosis.  We will continue to trend this and get a procalcitonin level.  Patient states that his pain is much better controlled but he still taking shallow breaths.  I prescribed him hydrocodone cough syrup.  Blood cultures remain negative to  "date.   12/27: Improving patient's hypoxia today and was found sitting in chair.  I have encouraged aggressive ISS and ambulation.  Patient states that his pain is improving and I encouraged him to take deep breaths.  Patient's procalcitonin is trending downwards.  improvement in patient's WBC count.  12/28: Patient had no acute issues today.  Transitioning from Zosyn to Augmentin.  Encouraged ISS and ambulation.  12/29: Mr. Hall was evaluated and examined in the ICU. He remains on high flow oxygen 70 liters and 90%. He has been up to side of bed though gets quite SOB with exertion. His appetite is poor. Glucose levels low hundreds.  12/30: patient seen and evaluated in the ICU. Blood sugars remain appropriate in the low to mid hundreds. He remains on high flow oxygen. His anxiety has been helped by Xanax.\"  12/31: Patient transferred from ICU overnight.  Remains on HFNC.  No acute complaints this morning.      1/1: Remains on high flow oxygen.  Reported minor nose bleed from high flow oxygen, but otherwise no complaints.    Consultants/Specialty  Critical care    Code Status  Full Code    Disposition  Medical with high-flow capacity    Review of Systems  Review of Systems   Constitutional: Negative for chills and fever.   Respiratory: Positive for cough (improving), sputum production (improving) and shortness of breath (improving).    Cardiovascular: Negative for chest pain and leg swelling.   Gastrointestinal: Negative for nausea and vomiting.        Improved appetite   Musculoskeletal: Negative for myalgias.   Neurological: Negative for headaches.   All other systems reviewed and are negative.       Physical Exam  Temp:  [36.2 °C (97.2 °F)-36.8 °C (98.2 °F)] 36.2 °C (97.2 °F)  Pulse:  [] 93  Resp:  [18-22] 18  BP: ()/(69-80) 115/69  SpO2:  [83 %-95 %] 91 %    Physical Exam  Vitals signs and nursing note reviewed.   Constitutional:       General: He is not in acute distress.     Appearance: He is " not toxic-appearing.   HENT:      Head: Normocephalic and atraumatic.      Mouth/Throat:      Mouth: Mucous membranes are dry.      Pharynx: Oropharynx is clear.   Eyes:      General: No scleral icterus.     Conjunctiva/sclera: Conjunctivae normal.   Neck:      Musculoskeletal: Normal range of motion and neck supple.   Cardiovascular:      Rate and Rhythm: Normal rate and regular rhythm.      Comments: Distant regular heart sounds  Pulmonary:      Breath sounds: Rales present.      Comments: On HFNC.   Increased work of breathing is improving.    Minimal rales b/l.   Abdominal:      General: There is no distension.      Tenderness: There is no abdominal tenderness.   Musculoskeletal:      Right lower leg: No edema.      Left lower leg: No edema.   Skin:     General: Skin is warm and dry.   Neurological:      General: No focal deficit present.      Mental Status: He is alert and oriented to person, place, and time.   Psychiatric:         Mood and Affect: Mood normal.         Behavior: Behavior normal.         Fluids    Intake/Output Summary (Last 24 hours) at 1/1/2021 1713  Last data filed at 1/1/2021 1500  Gross per 24 hour   Intake 240 ml   Output 1150 ml   Net -910 ml       Laboratory  Recent Labs     01/01/21  0602   WBC 9.0   RBC 4.05*   HEMOGLOBIN 12.1*   HEMATOCRIT 37.5*   MCV 92.6   MCH 29.9   MCHC 32.3*   RDW 45.5   PLATELETCT 714*   MPV 9.0     Recent Labs     01/01/21  0602   SODIUM 135   POTASSIUM 3.9   CHLORIDE 98   CO2 26   GLUCOSE 128*   BUN 12   CREATININE 0.61   CALCIUM 9.1                   Imaging  EC-ECHOCARDIOGRAM LTD W/O CONT   Final Result      DX-CHEST-PORTABLE (1 VIEW)   Final Result      Persistent right greater left, consolidation consistent with Covid 19 pneumonia.      US-EXTREMITY VENOUS LOWER BILAT   Final Result      DX-CHEST-PORTABLE (1 VIEW)   Final Result      No significant change in Covid pneumonia.      US-EXTREMITY VENOUS LOWER BILAT   Final Result      EC-ECHOCARDIOGRAM LTD W/O  CONT   Final Result      DX-CHEST-LIMITED (1 VIEW)   Final Result      No significant change in bilateral pneumonia.      DX-CHEST-LIMITED (1 VIEW)   Final Result         Persistent bilateral pulmonary opacities consistent with Covid 19 pneumonia.      DX-CHEST-PORTABLE (1 VIEW)   Final Result      Bilateral peripheral pulmonary airspace process highly suspicious for Covid pneumonia           Assessment/Plan  * Acute hypoxemic respiratory failure (HCC)- (present on admission)  Assessment & Plan  Secondary to COVID pneumonia superimposed to the patient with pneumonia  Status post course of 10 days Decadron  Encourage incentive spirometer, self proning, titrate oxygen as needed  COVID + as of 12/8    Pneumonia due to COVID-19 virus  Assessment & Plan  Tested COVID-19 positive on 12/8  Remains on high flow oxygen; wean as tolerated  S/p Decadron 6 mg p.o. daily until 12/17  S/p 5 days course of remdesivir with last dose 12/13.  continue incentive spirometer  Encourage proning and mobilization  Patient was treated with empiric IV antibiotics which were stopped  Ultrasound negative for DVT in bilateral lower extremities, echocardiogram reveals normal LVEF    Anxiety  Assessment & Plan  Ordered Xanax 0.25mg TIDprn   On escitalopram 10mg daily    Elevated d-dimer- (present on admission)  Assessment & Plan  12/16 LE US negative for DVT  Echo from 12/11 reviewed with no right heart failure.    Abnormal LFTs  Assessment & Plan  Suspect due to covid 19   Monitoring CMP (last checked 12/13)  12/17 LFTs showing improvement.  Likely viral etiology    Aspiration pneumonia (HCC)  Assessment & Plan  Found to have left sided infiltrate  Started on HCAP antibiotics including (Zosyn and doxycyline) and de-escalated to Augmentin/doxy (procalcitonin had been 5 after admit though went down to 0.4 on 12/26.  Completed abx course on 12/30.   Negative blood cultures from 12/23  Respiratory care protocol   Placed on o2 therapy     Type 2  diabetes mellitus with hyperglycemia, without long-term current use of insulin (HCC)  Assessment & Plan  HbA1c 7.2, continue hypoglycemia protocol, accu-checks AC and HS and diabetic diet  Started lantus 11U for better BG control   Blood glucose low hundreds    Sepsis (HCC)  Assessment & Plan  This is Severe Sepsis Present on admission  SIRS criteria identified on my evaluation include: Tachycardia, with heart rate greater than 90 BPM, Tachypnea, with respirations greater than 20 per minute and Leukocytosis, with WBC greater than 12,000  Source of infection is pulmonary  Clinical indicators of end organ dysfunction include Lactate >2 mmol/L (18.0 mg/dL)  Sepsis protocol initiated  Fluid resuscitation ordered per protocol  IV antibiotics as appropriate for source of sepsis  Reassessment: I have reassessed the patient's hemodynamic status  End organ dysfunction include(s):  Acute respiratory failure     Neuropathic pain- (present on admission)  Assessment & Plan  gabapentin       VTE prophylaxis: lovenox    I have performed a physical exam, and I have reviewed and updated ROS and Plan today (01/01/21).  In review of yesterday's note, changes are noted above.

## 2021-01-03 LAB
ALBUMIN SERPL BCP-MCNC: 3.1 G/DL (ref 3.2–4.9)
ALBUMIN/GLOB SERPL: 0.8 G/DL
ALP SERPL-CCNC: 266 U/L (ref 30–99)
ALT SERPL-CCNC: 80 U/L (ref 2–50)
ANION GAP SERPL CALC-SCNC: 9 MMOL/L (ref 7–16)
AST SERPL-CCNC: 67 U/L (ref 12–45)
BASOPHILS # BLD AUTO: 0.9 % (ref 0–1.8)
BASOPHILS # BLD: 0.1 K/UL (ref 0–0.12)
BILIRUB SERPL-MCNC: 0.2 MG/DL (ref 0.1–1.5)
BUN SERPL-MCNC: 11 MG/DL (ref 8–22)
CALCIUM SERPL-MCNC: 9.3 MG/DL (ref 8.5–10.5)
CHLORIDE SERPL-SCNC: 99 MMOL/L (ref 96–112)
CO2 SERPL-SCNC: 29 MMOL/L (ref 20–33)
CREAT SERPL-MCNC: 0.71 MG/DL (ref 0.5–1.4)
EOSINOPHIL # BLD AUTO: 1.03 K/UL (ref 0–0.51)
EOSINOPHIL NFR BLD: 9.5 % (ref 0–6.9)
ERYTHROCYTE [DISTWIDTH] IN BLOOD BY AUTOMATED COUNT: 46 FL (ref 35.9–50)
GLOBULIN SER CALC-MCNC: 3.9 G/DL (ref 1.9–3.5)
GLUCOSE BLD-MCNC: 104 MG/DL (ref 65–99)
GLUCOSE BLD-MCNC: 114 MG/DL (ref 65–99)
GLUCOSE BLD-MCNC: 150 MG/DL (ref 65–99)
GLUCOSE BLD-MCNC: 159 MG/DL (ref 65–99)
GLUCOSE BLD-MCNC: 176 MG/DL (ref 65–99)
GLUCOSE SERPL-MCNC: 148 MG/DL (ref 65–99)
HCT VFR BLD AUTO: 38.8 % (ref 42–52)
HGB BLD-MCNC: 12.3 G/DL (ref 14–18)
IMM GRANULOCYTES # BLD AUTO: 0.24 K/UL (ref 0–0.11)
IMM GRANULOCYTES NFR BLD AUTO: 2.2 % (ref 0–0.9)
LYMPHOCYTES # BLD AUTO: 3.03 K/UL (ref 1–4.8)
LYMPHOCYTES NFR BLD: 28.1 % (ref 22–41)
MCH RBC QN AUTO: 29.4 PG (ref 27–33)
MCHC RBC AUTO-ENTMCNC: 31.7 G/DL (ref 33.7–35.3)
MCV RBC AUTO: 92.8 FL (ref 81.4–97.8)
MONOCYTES # BLD AUTO: 1.04 K/UL (ref 0–0.85)
MONOCYTES NFR BLD AUTO: 9.6 % (ref 0–13.4)
NEUTROPHILS # BLD AUTO: 5.36 K/UL (ref 1.82–7.42)
NEUTROPHILS NFR BLD: 49.7 % (ref 44–72)
NRBC # BLD AUTO: 0 K/UL
NRBC BLD-RTO: 0 /100 WBC
PLATELET # BLD AUTO: 796 K/UL (ref 164–446)
PMV BLD AUTO: 8.8 FL (ref 9–12.9)
POTASSIUM SERPL-SCNC: 3.9 MMOL/L (ref 3.6–5.5)
PROCALCITONIN SERPL-MCNC: 0.13 NG/ML
PROT SERPL-MCNC: 7 G/DL (ref 6–8.2)
RBC # BLD AUTO: 4.18 M/UL (ref 4.7–6.1)
SODIUM SERPL-SCNC: 137 MMOL/L (ref 135–145)
WBC # BLD AUTO: 10.8 K/UL (ref 4.8–10.8)

## 2021-01-03 PROCEDURE — 700111 HCHG RX REV CODE 636 W/ 250 OVERRIDE (IP): Performed by: EMERGENCY MEDICINE

## 2021-01-03 PROCEDURE — 80053 COMPREHEN METABOLIC PANEL: CPT

## 2021-01-03 PROCEDURE — 36415 COLL VENOUS BLD VENIPUNCTURE: CPT

## 2021-01-03 PROCEDURE — 99232 SBSQ HOSP IP/OBS MODERATE 35: CPT | Performed by: STUDENT IN AN ORGANIZED HEALTH CARE EDUCATION/TRAINING PROGRAM

## 2021-01-03 PROCEDURE — 700101 HCHG RX REV CODE 250: Performed by: HOSPITALIST

## 2021-01-03 PROCEDURE — 93005 ELECTROCARDIOGRAM TRACING: CPT | Performed by: STUDENT IN AN ORGANIZED HEALTH CARE EDUCATION/TRAINING PROGRAM

## 2021-01-03 PROCEDURE — 700102 HCHG RX REV CODE 250 W/ 637 OVERRIDE(OP): Performed by: INTERNAL MEDICINE

## 2021-01-03 PROCEDURE — A9270 NON-COVERED ITEM OR SERVICE: HCPCS | Performed by: INTERNAL MEDICINE

## 2021-01-03 PROCEDURE — 84145 PROCALCITONIN (PCT): CPT

## 2021-01-03 PROCEDURE — 82962 GLUCOSE BLOOD TEST: CPT | Mod: 91

## 2021-01-03 PROCEDURE — 85025 COMPLETE CBC W/AUTO DIFF WBC: CPT

## 2021-01-03 PROCEDURE — A9270 NON-COVERED ITEM OR SERVICE: HCPCS | Performed by: HOSPITALIST

## 2021-01-03 PROCEDURE — 700102 HCHG RX REV CODE 250 W/ 637 OVERRIDE(OP): Performed by: HOSPITALIST

## 2021-01-03 PROCEDURE — 770020 HCHG ROOM/CARE - TELE (206)

## 2021-01-03 RX ADMIN — INSULIN GLARGINE 11 UNITS: 100 INJECTION, SOLUTION SUBCUTANEOUS at 17:19

## 2021-01-03 RX ADMIN — ENOXAPARIN SODIUM 40 MG: 40 INJECTION SUBCUTANEOUS at 06:04

## 2021-01-03 RX ADMIN — INSULIN HUMAN 3 UNITS: 100 INJECTION, SOLUTION PARENTERAL at 12:33

## 2021-01-03 RX ADMIN — GABAPENTIN 400 MG: 400 CAPSULE ORAL at 17:21

## 2021-01-03 RX ADMIN — GABAPENTIN 400 MG: 400 CAPSULE ORAL at 06:05

## 2021-01-03 RX ADMIN — LIDOCAINE 1 PATCH: 50 PATCH TOPICAL at 12:34

## 2021-01-03 RX ADMIN — ESCITALOPRAM OXALATE 10 MG: 10 TABLET ORAL at 06:06

## 2021-01-03 RX ADMIN — OMEPRAZOLE 20 MG: 20 CAPSULE, DELAYED RELEASE ORAL at 06:05

## 2021-01-03 ASSESSMENT — ENCOUNTER SYMPTOMS
ROS GI COMMENTS: IMPROVED APPETITE
MYALGIAS: 0
VOMITING: 0
SPUTUM PRODUCTION: 1
NAUSEA: 0
CHILLS: 0
SHORTNESS OF BREATH: 1
COUGH: 1
HEADACHES: 0
FEVER: 0

## 2021-01-03 NOTE — PROGRESS NOTES
Bedside report received from day RN. Assumed care of pt at 1900. Pt is on  8 LPM NC of O2.Pt sating 92. Assessment completed with intepreter  Pt has tele box in place. No s/s of pain or discomfort. Educated to call light. Bed is in low and locked position. Will continue to monitor.

## 2021-01-03 NOTE — PROGRESS NOTES
Bedside report received from night shift RN. Pt alert sitting up in bed with no complaints of pain at this time. Language line used for assessment. Moderate work of breath with exertion, pivot to chair, and pt's O2 saturation dropped into the 60's. ~5 min to recover back to baseline. Bed in low and locked position, call button within reach and fall precautions are in place

## 2021-01-03 NOTE — PROGRESS NOTES
"Hospital Medicine Daily Progress Note    Date of Service  1/2/2021    Chief Complaint  55 y.o. male admitted 12/8/2020 with shortness of breath.    Hospital Course  \"55 y.o. male who presented 12/8/2020 with past medical history of chronic neuropathy who presents to the hospital for shortness of breath.  This patient states that his symptoms started about 9 days ago.  He initially started with symptoms of diarrhea and vomiting.  Also with some generalized body aches.  Subsequently started developing shortness of breath with cough congestion and sputum production.  Otherwise no known alleviating or exacerbating factors to his symptoms.  He did get tested for COVID-19 however did not follow-up the test results.  Shortness of breath has gradually worsened over the last several days and now is severe.     In the emergency department is found to be in respiratory distress and placed on high flow nasal cannula and was  admitted to the hospital service for further management of his symptoms\"    Interval Problem Update  \"12/15: On high flow NC (60L at 100%). On day 7 of 10 Decadron.  Tolerating diet.    12/16: Alert but ongoing respiratory distress.  Acute worsening this am with sinus tachycardia on telemetry and worsening oxygen saturation.  An ABG was requested and showed pH:7.46/pCO2:34.5/pO2:48.  I alerted Dr Mayo for possible need of bipap or intubation.   12/17: Still tenuous from a respiratory standpoint.  He is anxious at times and a bit scared.  Used  via iPad to communicate with him this morning.  He remains on 90% FiO2 with 60 L O2.   12/18: On 60L 100% and rapid shallow breathing.  Tearful at times.  Denied pain.  Able to talk in short sentences.  Discussed with Dr Mayo    12/19: Remains on high amounts of oxygen and very weak.  He states he is scared and nervous.  He remains on 60L and 100% via HFNC. I have called his sister and updated her on her brother's status.   12/20 Less respiratory " distress.  Watching TV.  Some episodes on anxiety and RN states the antivan the other day helped patient.  Eating breakfast.   12/21 Looks better today but still very weak.  Oxygen now down to 50L and 50% via HFNC.  Awake and eating breakfast.  Anxious at times.  Watches TV.   12/22: Patient seen and examined by me today.  He was complaining about chest pain but EKG found Q waves in inferior leads but no significant ST segment changes.  He remains on 50 L, 50% FiO2 of high flow nasal cannula.  Continue incentive spirometer and ambulation to wean off oxygen.  In addition, we will start Lantus to help control blood glucose better.   12/23: Patient continues to complain of chest pain..  DC depressions, ST elevations on EKG indicating pericarditis.  Limited cardiac echo ordered to evaluate for pericardial effusion.  In addition I started him on ibuprofen and Cardizem.  Patient did have increasing oxygen requirement today.   12/24: Overnight patient found to be septic protocol was started.  So far cultures have remained negative.  Likely source is a left-sided pneumonia found on chest x-ray.  This is likely cause of his pleuritic chest pain as well.  IV Unasyn and doxycycline has been started.  Since patient still spiking fevers and has lactic acidosis I will transition him to IV Zosyn.   12/25: Patient continues to complain of left-sided pleuritic pain.  Started lidocaine patch and prescribed ibuprofen.  He is taking shallow breaths and chest x-ray found bilateral atelectasis.  Encouraged incentive spirometry and ambulation.  He is having increased leukocytosis will continue IV antibiotics for now.  Cultures are negative to date.  12/26: Patient does have increased leukocytosis.  We will continue to trend this and get a procalcitonin level.  Patient states that his pain is much better controlled but he still taking shallow breaths.  I prescribed him hydrocodone cough syrup.  Blood cultures remain negative to  "date.   12/27: Improving patient's hypoxia today and was found sitting in chair.  I have encouraged aggressive ISS and ambulation.  Patient states that his pain is improving and I encouraged him to take deep breaths.  Patient's procalcitonin is trending downwards.  improvement in patient's WBC count.  12/28: Patient had no acute issues today.  Transitioning from Zosyn to Augmentin.  Encouraged ISS and ambulation.  12/29: Mr. Hall was evaluated and examined in the ICU. He remains on high flow oxygen 70 liters and 90%. He has been up to side of bed though gets quite SOB with exertion. His appetite is poor. Glucose levels low hundreds.  12/30: patient seen and evaluated in the ICU. Blood sugars remain appropriate in the low to mid hundreds. He remains on high flow oxygen. His anxiety has been helped by Xanax.\"  12/31: Patient transferred from ICU overnight.  Remains on HFNC.  No acute complaints this morning.    1/1: Remains on high flow oxygen.  Reported minor nose bleed from high flow oxygen, but otherwise no complaints.    1/2: Oxygen requirements improving today to 6 L,  SBPs 90s-110s; patient without symptoms.      Consultants/Specialty  Critical care    Code Status  Full Code    Disposition  Medical with high-flow capacity    Review of Systems  Review of Systems   Constitutional: Negative for chills and fever.   Respiratory: Positive for cough (improving), sputum production (improving) and shortness of breath (improving).    Cardiovascular: Negative for chest pain and leg swelling.   Gastrointestinal: Negative for nausea and vomiting.        Improved appetite   Musculoskeletal: Negative for myalgias.   Neurological: Negative for headaches.   All other systems reviewed and are negative.       Physical Exam  Temp:  [36.4 °C (97.6 °F)-37 °C (98.6 °F)] 36.6 °C (97.8 °F)  Pulse:  [68-99] 86  Resp:  [18-26] 18  BP: ()/(66-74) 94/66  SpO2:  [90 %-97 %] 97 %    Physical Exam  Vitals signs and nursing note " reviewed.   Constitutional:       General: He is not in acute distress.     Appearance: He is not toxic-appearing.   HENT:      Head: Normocephalic and atraumatic.      Mouth/Throat:      Mouth: Mucous membranes are moist.      Pharynx: Oropharynx is clear.   Eyes:      General: No scleral icterus.     Conjunctiva/sclera: Conjunctivae normal.   Neck:      Musculoskeletal: Normal range of motion and neck supple.   Cardiovascular:      Rate and Rhythm: Normal rate and regular rhythm.      Comments: Distant regular heart sounds  Pulmonary:      Breath sounds: Rales present.      Comments: On 6L oxygen.   Increased work of breathing continuing to improve.    Minimal rales b/l.   Abdominal:      General: Bowel sounds are normal.      Palpations: Abdomen is soft.      Tenderness: There is no abdominal tenderness. There is no guarding or rebound.   Musculoskeletal:      Right lower leg: No edema.      Left lower leg: No edema.   Skin:     General: Skin is warm and dry.   Neurological:      General: No focal deficit present.      Mental Status: He is alert and oriented to person, place, and time.   Psychiatric:         Mood and Affect: Mood normal.         Behavior: Behavior normal.         Fluids    Intake/Output Summary (Last 24 hours) at 1/2/2021 1717  Last data filed at 1/2/2021 1700  Gross per 24 hour   Intake 720 ml   Output 1175 ml   Net -455 ml       Laboratory  Recent Labs     01/01/21  0602 01/02/21  0823   WBC 9.0 9.1   RBC 4.05* 4.12*   HEMOGLOBIN 12.1* 12.0*   HEMATOCRIT 37.5* 37.9*   MCV 92.6 92.0   MCH 29.9 29.1   MCHC 32.3* 31.7*   RDW 45.5 44.7   PLATELETCT 714* 813*   MPV 9.0 9.1     Recent Labs     01/01/21  0602 01/02/21  0823   SODIUM 135 135   POTASSIUM 3.9 3.9   CHLORIDE 98 97   CO2 26 28   GLUCOSE 128* 110*   BUN 12 13   CREATININE 0.61 0.63   CALCIUM 9.1 9.2                   Imaging  EC-ECHOCARDIOGRAM LTD W/O CONT   Final Result      DX-CHEST-PORTABLE (1 VIEW)   Final Result      Persistent right  greater left, consolidation consistent with Covid 19 pneumonia.      US-EXTREMITY VENOUS LOWER BILAT   Final Result      DX-CHEST-PORTABLE (1 VIEW)   Final Result      No significant change in Covid pneumonia.      US-EXTREMITY VENOUS LOWER BILAT   Final Result      EC-ECHOCARDIOGRAM LTD W/O CONT   Final Result      DX-CHEST-LIMITED (1 VIEW)   Final Result      No significant change in bilateral pneumonia.      DX-CHEST-LIMITED (1 VIEW)   Final Result         Persistent bilateral pulmonary opacities consistent with Covid 19 pneumonia.      DX-CHEST-PORTABLE (1 VIEW)   Final Result      Bilateral peripheral pulmonary airspace process highly suspicious for Covid pneumonia           Assessment/Plan  * Acute hypoxemic respiratory failure (HCC)- (present on admission)  Assessment & Plan  Secondary to COVID pneumonia.  Oxygen PRN; wean as tolerated.    Pneumonia due to COVID-19 virus  Assessment & Plan  Tested COVID-19 positive on 12/8.  Oxygen requirements starting to improve.  On 6 L now, 1/2/21.  S/p Decadron, remdesivir.  ICS, encourage proning, and mobilization.  Patient was treated with empiric IV antibiotics, which were stopped.  Ultrasound negative for DVT in bilateral lower extremities, echocardiogram reveals normal LVEF.    Anxiety  Assessment & Plan  Ordered Xanax 0.25mg TIDprn   On escitalopram 10mg daily    Elevated d-dimer- (present on admission)  Assessment & Plan  12/16 LE US negative for DVT  Echo from 12/11 reviewed with no right heart failure.    Abnormal LFTs  Assessment & Plan  Suspect due to covid 19   Monitoring CMP (last checked 12/13)  12/17 LFTs showing improvement.  Likely viral etiology    Aspiration pneumonia (HCC)  Assessment & Plan  Found to have left sided infiltrate  Started on HCAP antibiotics including (Zosyn and doxycyline) and de-escalated to Augmentin/doxy (procalcitonin had been 5 after admit though went down to 0.4 on 12/26.  Completed abx course on 12/30.   Negative blood cultures  from 12/23  Respiratory care protocol   Placed on o2 therapy     Type 2 diabetes mellitus with hyperglycemia, without long-term current use of insulin (HCC)  Assessment & Plan  HbA1c 7.2, continue hypoglycemia protocol, accu-checks AC and HS and diabetic diet  Started lantus 11U for better BG control   Blood glucose low hundreds    Sepsis (HCC)  Assessment & Plan  This is Severe Sepsis Present on admission  SIRS criteria identified on my evaluation include: Tachycardia, with heart rate greater than 90 BPM, Tachypnea, with respirations greater than 20 per minute and Leukocytosis, with WBC greater than 12,000  Source of infection is pulmonary  Clinical indicators of end organ dysfunction include Lactate >2 mmol/L (18.0 mg/dL)  Sepsis protocol initiated  Fluid resuscitation ordered per protocol  IV antibiotics as appropriate for source of sepsis  Reassessment: I have reassessed the patient's hemodynamic status  End organ dysfunction include(s):  Acute respiratory failure     Neuropathic pain- (present on admission)  Assessment & Plan  gabapentin       VTE prophylaxis: enoxaparin.

## 2021-01-03 NOTE — PROGRESS NOTES
Bedside report received from day RN. Assumed care of pt at 1900. Pt is on 8 LPM on OXYMask  of O2 and sating 91. Assessment was completed with  service. Pt has tele box in place. No s/s of pain or discomfort. Educated to call light. Bed is in low and locked position. Will continue to monitor.

## 2021-01-04 LAB
EKG IMPRESSION: NORMAL
GLUCOSE BLD-MCNC: 116 MG/DL (ref 65–99)
GLUCOSE BLD-MCNC: 130 MG/DL (ref 65–99)
GLUCOSE BLD-MCNC: 165 MG/DL (ref 65–99)

## 2021-01-04 PROCEDURE — 99233 SBSQ HOSP IP/OBS HIGH 50: CPT | Performed by: STUDENT IN AN ORGANIZED HEALTH CARE EDUCATION/TRAINING PROGRAM

## 2021-01-04 PROCEDURE — 770020 HCHG ROOM/CARE - TELE (206)

## 2021-01-04 PROCEDURE — 82962 GLUCOSE BLOOD TEST: CPT | Mod: 91

## 2021-01-04 PROCEDURE — 700111 HCHG RX REV CODE 636 W/ 250 OVERRIDE (IP): Performed by: EMERGENCY MEDICINE

## 2021-01-04 PROCEDURE — A9270 NON-COVERED ITEM OR SERVICE: HCPCS | Performed by: STUDENT IN AN ORGANIZED HEALTH CARE EDUCATION/TRAINING PROGRAM

## 2021-01-04 PROCEDURE — 700102 HCHG RX REV CODE 250 W/ 637 OVERRIDE(OP): Performed by: INTERNAL MEDICINE

## 2021-01-04 PROCEDURE — 700102 HCHG RX REV CODE 250 W/ 637 OVERRIDE(OP): Performed by: STUDENT IN AN ORGANIZED HEALTH CARE EDUCATION/TRAINING PROGRAM

## 2021-01-04 PROCEDURE — 700101 HCHG RX REV CODE 250: Performed by: HOSPITALIST

## 2021-01-04 PROCEDURE — 93010 ELECTROCARDIOGRAM REPORT: CPT | Performed by: INTERNAL MEDICINE

## 2021-01-04 PROCEDURE — A9270 NON-COVERED ITEM OR SERVICE: HCPCS | Performed by: HOSPITALIST

## 2021-01-04 PROCEDURE — 700102 HCHG RX REV CODE 250 W/ 637 OVERRIDE(OP): Performed by: HOSPITALIST

## 2021-01-04 PROCEDURE — A9270 NON-COVERED ITEM OR SERVICE: HCPCS | Performed by: INTERNAL MEDICINE

## 2021-01-04 RX ORDER — CARBOXYMETHYLCELLULOSE SODIUM 5 MG/ML
1 SOLUTION/ DROPS OPHTHALMIC PRN
Status: DISCONTINUED | OUTPATIENT
Start: 2021-01-04 | End: 2021-01-09 | Stop reason: HOSPADM

## 2021-01-04 RX ADMIN — SALINE NASAL SPRAY 2 SPRAY: 1.5 SOLUTION NASAL at 05:36

## 2021-01-04 RX ADMIN — OMEPRAZOLE 20 MG: 20 CAPSULE, DELAYED RELEASE ORAL at 05:35

## 2021-01-04 RX ADMIN — INSULIN GLARGINE 11 UNITS: 100 INJECTION, SOLUTION SUBCUTANEOUS at 17:34

## 2021-01-04 RX ADMIN — INSULIN HUMAN 3 UNITS: 100 INJECTION, SOLUTION PARENTERAL at 17:34

## 2021-01-04 RX ADMIN — GABAPENTIN 400 MG: 400 CAPSULE ORAL at 05:35

## 2021-01-04 RX ADMIN — LIDOCAINE 1 PATCH: 50 PATCH TOPICAL at 11:41

## 2021-01-04 RX ADMIN — CARBOXYMETHYLCELLULOSE SODIUM 1 DROP: 5 SOLUTION/ DROPS OPHTHALMIC at 20:31

## 2021-01-04 RX ADMIN — ENOXAPARIN SODIUM 40 MG: 40 INJECTION SUBCUTANEOUS at 05:35

## 2021-01-04 RX ADMIN — GABAPENTIN 400 MG: 400 CAPSULE ORAL at 17:35

## 2021-01-04 RX ADMIN — ESCITALOPRAM OXALATE 10 MG: 10 TABLET ORAL at 05:35

## 2021-01-04 ASSESSMENT — ENCOUNTER SYMPTOMS
SHORTNESS OF BREATH: 1
CHILLS: 0
ROS GI COMMENTS: IMPROVED APPETITE
NAUSEA: 0
FEVER: 0
SPUTUM PRODUCTION: 1
HEADACHES: 0
MYALGIAS: 0
COUGH: 1
VOMITING: 0

## 2021-01-04 NOTE — PROGRESS NOTES
Bedside report received and patient care assumed. Pt is lying in bed, AOx4, and is on NC/OM @ 5L with SpO2 99%. Breathing is even and unlabored.Tele box on. Bed in lowest position, bed alarm on, and call light within reach. All fall precautions are in place, belongings at bedside table. Hourly rounding initiated.  required.

## 2021-01-04 NOTE — PROGRESS NOTES
Monitor summary:      Rhythm: SR/ST  Rate:   Ectopy: rPVC  Measurements: .16/.08/.40       12hr chart check

## 2021-01-04 NOTE — PROGRESS NOTES
Bedside report received from nurse. Assumed care of pt. Pt resting comfortably in bed. A/Ox4, VSS on 5L,  All needs met. POC reviewed and white board updated. Tele box on. Call light in reach. Bed locked in lowest position with 2 upper bed rails up. No pain or complaints

## 2021-01-04 NOTE — WOUND TEAM
[facility]  Inpatient Services  Initial Wound and Skin Care Evaluation    Admission Date: 12/8/2020     Last order of IP CONSULT TO WOUND CARE was found on 12/22/2020 from Hospital Encounter on 12/8/2020     HPI, PMH, SH: Reviewed    Unit where seen by Wound Team: [id number]     WOUND CONSULT/FOLLOW UP RELATED TO:  Bilateral ears.      Self Report / Pain Level:  Discomfort with assessment and bright lights.        OBJECTIVE:  In bed, head band to help with offloading.  mepilex sacral padding straps on right side.     WOUND TYPE, LOCATION, CHARACTERISTICS (Pressure Injuries: location, stage, POA or date identified)    Wound 12/22/20 Pressure Injury Ear Right R ear unstageable 12/22 (Active)   Wound Image     01/03/21 1700   Site Assessment Yellow    Periwound Assessment Clean;Dry;Intact    Margins Attached edges;Defined edges    Closure Adhesive bandage    Drainage Amount None    Treatments Cleansed;Site care;Offloading;Autolytic Debridement    Wound Cleansing Normal Saline Irrigation    Periwound Protectant Skin Protectant Wipes to Periwound    Dressing Cleansing/Solutions Not Applicable    Dressing Options Hydrocolloid Thin;Mepilex    Dressing Changed Changed    Dressing Status Clean;Dry;Intact    Dressing Change/Treatment Frequency Every 72 hrs, and As Needed    NEXT Dressing Change/Treatment Date 01/06/21    NEXT Weekly Photo (Inpatient Only) 01/10/21    Pressure Injury Stage U    Wound Length (cm) 0.5 cm    Wound Width (cm) 0.2 cm    Wound Surface Area (cm^2) 0.1 cm^2    Wound Bed Eschar % - (Post-Procedure) 100 %    WOUND NURSE ONLY - Time Spent with Patient (mins) 60                                                                   ^^ LEFT EAR RESOLVED ^^     Vascular:    JUNG:   No results found.    Lab Values:    Lab Results   Component Value Date/Time    WBC 10.8 01/03/2021 10:17 AM    RBC 4.18 (L) 01/03/2021 10:17 AM    HEMOGLOBIN 12.3 (L) 01/03/2021 10:17 AM    HEMATOCRIT 38.8 (L) 01/03/2021  10:17 AM    CREACTPROT 6.39 (H) 12/16/2020 10:00 AM    HBA1C 7.3 (H) 12/09/2020 08:28 AM      Culture Results show:  No results found for this or any previous visit (from the past 720 hour(s)).    INTERVENTIONS BY WOUND TEAM: Chart and images reviewed. Discussed with bedside RN. This RN in to assess patient. Bilateral ears cleansed with NS and gauze, new photos and measurements taken. Covered with cut piece of hydrocolloid thin.    Interdisciplinary consultation: Patient, Bedside RN    EVALUATION / RATIONALE FOR TREATMENT: Left ear now resolved. Right ear has significantly decreased in size and will likely be resolved by next week. Continued with thin hydrocolloid and offloading measures. Pt now on oxy mask     Goals: Steady decrease in wound area and depth weekly.    NURSING PLAN OF CARE ORDERS (X):    Dressing changes: See Dressing Care orders: X  Skin care: See Skin Care orders:   RN Prevention Protocol: X  Rectal tube care: See Rectal Tube Care orders:   Other orders:    WOUND TEAM PLAN OF CARE:   Dressing changes by wound team:                   Follow up 3 times weekly:                NPWT change 3 times weekly:     Follow up 1-2 times weekly:    X  Follow up Bi-Monthly:                   Follow up as needed:       Other (explain):     Anticipated discharge plans: TBA   LTACH:        SNF/Rehab:                  Home Health Care:           Outpatient Wound Center:            Self Care:

## 2021-01-04 NOTE — PROGRESS NOTES
"Hospital Medicine Daily Progress Note    Date of Service  1/3/2021    Chief Complaint  55 y.o. male admitted 12/8/2020 with shortness of breath.    Hospital Course  \"55 y.o. male who presented 12/8/2020 with past medical history of chronic neuropathy who presents to the hospital for shortness of breath.  This patient states that his symptoms started about 9 days ago.  He initially started with symptoms of diarrhea and vomiting.  Also with some generalized body aches.  Subsequently started developing shortness of breath with cough congestion and sputum production.  Otherwise no known alleviating or exacerbating factors to his symptoms.  He did get tested for COVID-19 however did not follow-up the test results.  Shortness of breath has gradually worsened over the last several days and now is severe.     In the emergency department is found to be in respiratory distress and placed on high flow nasal cannula and was  admitted to the hospital service for further management of his symptoms\"    Interval Problem Update  \"12/15: On high flow NC (60L at 100%). On day 7 of 10 Decadron.  Tolerating diet.    12/16: Alert but ongoing respiratory distress.  Acute worsening this am with sinus tachycardia on telemetry and worsening oxygen saturation.  An ABG was requested and showed pH:7.46/pCO2:34.5/pO2:48.  I alerted Dr Mayo for possible need of bipap or intubation.   12/17: Still tenuous from a respiratory standpoint.  He is anxious at times and a bit scared.  Used  via iPad to communicate with him this morning.  He remains on 90% FiO2 with 60 L O2.   12/18: On 60L 100% and rapid shallow breathing.  Tearful at times.  Denied pain.  Able to talk in short sentences.  Discussed with Dr Mayo    12/19: Remains on high amounts of oxygen and very weak.  He states he is scared and nervous.  He remains on 60L and 100% via HFNC. I have called his sister and updated her on her brother's status.   12/20 Less respiratory " distress.  Watching TV.  Some episodes on anxiety and RN states the antivan the other day helped patient.  Eating breakfast.   12/21 Looks better today but still very weak.  Oxygen now down to 50L and 50% via HFNC.  Awake and eating breakfast.  Anxious at times.  Watches TV.   12/22: Patient seen and examined by me today.  He was complaining about chest pain but EKG found Q waves in inferior leads but no significant ST segment changes.  He remains on 50 L, 50% FiO2 of high flow nasal cannula.  Continue incentive spirometer and ambulation to wean off oxygen.  In addition, we will start Lantus to help control blood glucose better.   12/23: Patient continues to complain of chest pain..  MA depressions, ST elevations on EKG indicating pericarditis.  Limited cardiac echo ordered to evaluate for pericardial effusion.  In addition I started him on ibuprofen and Cardizem.  Patient did have increasing oxygen requirement today.   12/24: Overnight patient found to be septic protocol was started.  So far cultures have remained negative.  Likely source is a left-sided pneumonia found on chest x-ray.  This is likely cause of his pleuritic chest pain as well.  IV Unasyn and doxycycline has been started.  Since patient still spiking fevers and has lactic acidosis I will transition him to IV Zosyn.   12/25: Patient continues to complain of left-sided pleuritic pain.  Started lidocaine patch and prescribed ibuprofen.  He is taking shallow breaths and chest x-ray found bilateral atelectasis.  Encouraged incentive spirometry and ambulation.  He is having increased leukocytosis will continue IV antibiotics for now.  Cultures are negative to date.  12/26: Patient does have increased leukocytosis.  We will continue to trend this and get a procalcitonin level.  Patient states that his pain is much better controlled but he still taking shallow breaths.  I prescribed him hydrocodone cough syrup.  Blood cultures remain negative to  "date.   12/27: Improving patient's hypoxia today and was found sitting in chair.  I have encouraged aggressive ISS and ambulation.  Patient states that his pain is improving and I encouraged him to take deep breaths.  Patient's procalcitonin is trending downwards.  improvement in patient's WBC count.  12/28: Patient had no acute issues today.  Transitioning from Zosyn to Augmentin.  Encouraged ISS and ambulation.  12/29: Mr. Hall was evaluated and examined in the ICU. He remains on high flow oxygen 70 liters and 90%. He has been up to side of bed though gets quite SOB with exertion. His appetite is poor. Glucose levels low hundreds.  12/30: patient seen and evaluated in the ICU. Blood sugars remain appropriate in the low to mid hundreds. He remains on high flow oxygen. His anxiety has been helped by Xanax.\"  12/31: Patient transferred from ICU overnight.  Remains on HFNC.  No acute complaints this morning.    1/1: Remains on high flow oxygen.  Reported minor nose bleed from high flow oxygen, but otherwise no complaints.  1/2: Oxygen requirements improving today to 6 L,  SBPs 90s-110s; patient without symptoms.      1/3: Oxygen requirements about 6-8 L today.  SBP is 100s-110s; asymptomatic.  Patient eating well.    Consultants/Specialty  Critical care    Code Status  Full Code    Disposition  Medical with high-flow capacity    Review of Systems  Review of Systems   Constitutional: Negative for chills and fever.   Respiratory: Positive for cough (improving), sputum production (improving) and shortness of breath (improving).    Cardiovascular: Negative for chest pain and leg swelling.   Gastrointestinal: Negative for nausea and vomiting.        Improved appetite   Musculoskeletal: Negative for myalgias.   Neurological: Negative for headaches.   All other systems reviewed and are negative.       Physical Exam  Temp:  [36.8 °C (98.2 °F)-37.2 °C (98.9 °F)] 36.9 °C (98.4 °F)  Pulse:  [69-91] 80  Resp:  [18-20] 18  BP: " (100-116)/(63-78) 108/74  SpO2:  [87 %-97 %] 96 %    Physical Exam  Vitals signs and nursing note reviewed.   Constitutional:       General: He is not in acute distress.     Appearance: He is not toxic-appearing.   HENT:      Head: Normocephalic and atraumatic.      Mouth/Throat:      Mouth: Mucous membranes are moist.      Pharynx: Oropharynx is clear.   Eyes:      General: No scleral icterus.     Conjunctiva/sclera: Conjunctivae normal.   Neck:      Musculoskeletal: Normal range of motion and neck supple.   Cardiovascular:      Rate and Rhythm: Normal rate and regular rhythm.      Comments: Distant regular heart sounds  Pulmonary:      Breath sounds: Rales present.      Comments: On 6L oxygen.   Increased work of breathing continuing to improve.    Minimal rales b/l.   Abdominal:      General: Bowel sounds are normal.      Palpations: Abdomen is soft.      Tenderness: There is no abdominal tenderness. There is no guarding or rebound.   Musculoskeletal:      Right lower leg: No edema.      Left lower leg: No edema.   Skin:     General: Skin is warm and dry.   Neurological:      General: No focal deficit present.      Mental Status: He is alert and oriented to person, place, and time.   Psychiatric:         Mood and Affect: Mood normal.         Behavior: Behavior normal.         Fluids    Intake/Output Summary (Last 24 hours) at 1/3/2021 2026  Last data filed at 1/3/2021 1700  Gross per 24 hour   Intake 480 ml   Output 925 ml   Net -445 ml       Laboratory  Recent Labs     01/01/21  0602 01/02/21  0823 01/03/21  1017   WBC 9.0 9.1 10.8   RBC 4.05* 4.12* 4.18*   HEMOGLOBIN 12.1* 12.0* 12.3*   HEMATOCRIT 37.5* 37.9* 38.8*   MCV 92.6 92.0 92.8   MCH 29.9 29.1 29.4   MCHC 32.3* 31.7* 31.7*   RDW 45.5 44.7 46.0   PLATELETCT 714* 813* 796*   MPV 9.0 9.1 8.8*     Recent Labs     01/01/21  0602 01/02/21  0823 01/03/21  1017   SODIUM 135 135 137   POTASSIUM 3.9 3.9 3.9   CHLORIDE 98 97 99   CO2 26 28 29   GLUCOSE 128* 110*  148*   BUN 12 13 11   CREATININE 0.61 0.63 0.71   CALCIUM 9.1 9.2 9.3                   Imaging  EC-ECHOCARDIOGRAM LTD W/O CONT   Final Result      DX-CHEST-PORTABLE (1 VIEW)   Final Result      Persistent right greater left, consolidation consistent with Covid 19 pneumonia.      US-EXTREMITY VENOUS LOWER BILAT   Final Result      DX-CHEST-PORTABLE (1 VIEW)   Final Result      No significant change in Covid pneumonia.      US-EXTREMITY VENOUS LOWER BILAT   Final Result      EC-ECHOCARDIOGRAM LTD W/O CONT   Final Result      DX-CHEST-LIMITED (1 VIEW)   Final Result      No significant change in bilateral pneumonia.      DX-CHEST-LIMITED (1 VIEW)   Final Result         Persistent bilateral pulmonary opacities consistent with Covid 19 pneumonia.      DX-CHEST-PORTABLE (1 VIEW)   Final Result      Bilateral peripheral pulmonary airspace process highly suspicious for Covid pneumonia           Assessment/Plan  * Acute hypoxemic respiratory failure (HCC)- (present on admission)  Assessment & Plan  Secondary to COVID pneumonia.  Oxygen PRN; wean as tolerated.    Pneumonia due to COVID-19 virus  Assessment & Plan  Tested COVID-19 positive on 12/8.  Oxygen requirements starting to improve.  On 6 L now, 1/2/21.  S/p Decadron, remdesivir.  ICS, encourage proning, and mobilization.  Patient was treated with empiric IV antibiotics, which were stopped.  Ultrasound negative for DVT in bilateral lower extremities, echocardiogram reveals normal LVEF.    Anxiety  Assessment & Plan  Ordered Xanax 0.25mg TIDprn   On escitalopram 10mg daily    Elevated d-dimer- (present on admission)  Assessment & Plan  12/16 LE US negative for DVT  Echo from 12/11 reviewed with no right heart failure.    Abnormal LFTs  Assessment & Plan  Suspect due to covid 19   Monitoring CMP (last checked 12/13)  12/17 LFTs showing improvement.  Likely viral etiology    Aspiration pneumonia (HCC)  Assessment & Plan  Found to have left sided infiltrate  Started on  HCAP antibiotics including (Zosyn and doxycyline) and de-escalated to Augmentin/doxy (procalcitonin had been 5 after admit though went down to 0.4 on 12/26.  Completed abx course on 12/30.   Negative blood cultures from 12/23  Respiratory care protocol   Placed on o2 therapy     Type 2 diabetes mellitus with hyperglycemia, without long-term current use of insulin (HCC)  Assessment & Plan  HbA1c 7.2, continue hypoglycemia protocol, accu-checks AC and HS and diabetic diet  Started lantus 11U for better BG control   Blood glucose low hundreds    Sepsis (HCC)  Assessment & Plan  This is Severe Sepsis Present on admission  SIRS criteria identified on my evaluation include: Tachycardia, with heart rate greater than 90 BPM, Tachypnea, with respirations greater than 20 per minute and Leukocytosis, with WBC greater than 12,000  Source of infection is pulmonary  Clinical indicators of end organ dysfunction include Lactate >2 mmol/L (18.0 mg/dL)  Sepsis protocol initiated  Fluid resuscitation ordered per protocol  IV antibiotics as appropriate for source of sepsis  Reassessment: I have reassessed the patient's hemodynamic status  End organ dysfunction include(s):  Acute respiratory failure     Neuropathic pain- (present on admission)  Assessment & Plan  gabapentin       VTE prophylaxis: enoxaparin.

## 2021-01-05 PROBLEM — A41.9 SEPSIS (HCC): Status: RESOLVED | Noted: 2020-12-08 | Resolved: 2021-01-05

## 2021-01-05 LAB
GLUCOSE BLD-MCNC: 129 MG/DL (ref 65–99)
GLUCOSE BLD-MCNC: 148 MG/DL (ref 65–99)
GLUCOSE BLD-MCNC: 151 MG/DL (ref 65–99)
GLUCOSE BLD-MCNC: 151 MG/DL (ref 65–99)
GLUCOSE BLD-MCNC: 159 MG/DL (ref 65–99)

## 2021-01-05 PROCEDURE — 700102 HCHG RX REV CODE 250 W/ 637 OVERRIDE(OP): Performed by: HOSPITALIST

## 2021-01-05 PROCEDURE — A9270 NON-COVERED ITEM OR SERVICE: HCPCS | Performed by: HOSPITALIST

## 2021-01-05 PROCEDURE — 700111 HCHG RX REV CODE 636 W/ 250 OVERRIDE (IP): Performed by: EMERGENCY MEDICINE

## 2021-01-05 PROCEDURE — A9270 NON-COVERED ITEM OR SERVICE: HCPCS | Performed by: INTERNAL MEDICINE

## 2021-01-05 PROCEDURE — 770020 HCHG ROOM/CARE - TELE (206)

## 2021-01-05 PROCEDURE — 99233 SBSQ HOSP IP/OBS HIGH 50: CPT | Performed by: INTERNAL MEDICINE

## 2021-01-05 PROCEDURE — 82962 GLUCOSE BLOOD TEST: CPT | Mod: 91

## 2021-01-05 PROCEDURE — 97530 THERAPEUTIC ACTIVITIES: CPT

## 2021-01-05 PROCEDURE — 700102 HCHG RX REV CODE 250 W/ 637 OVERRIDE(OP): Performed by: INTERNAL MEDICINE

## 2021-01-05 PROCEDURE — 700101 HCHG RX REV CODE 250: Performed by: HOSPITALIST

## 2021-01-05 RX ADMIN — INSULIN HUMAN 3 UNITS: 100 INJECTION, SOLUTION PARENTERAL at 17:52

## 2021-01-05 RX ADMIN — LIDOCAINE 1 PATCH: 50 PATCH TOPICAL at 12:19

## 2021-01-05 RX ADMIN — OMEPRAZOLE 20 MG: 20 CAPSULE, DELAYED RELEASE ORAL at 05:43

## 2021-01-05 RX ADMIN — CARBOXYMETHYLCELLULOSE SODIUM 1 DROP: 5 SOLUTION/ DROPS OPHTHALMIC at 05:43

## 2021-01-05 RX ADMIN — ESCITALOPRAM OXALATE 10 MG: 10 TABLET ORAL at 05:43

## 2021-01-05 RX ADMIN — INSULIN HUMAN 3 UNITS: 100 INJECTION, SOLUTION PARENTERAL at 12:24

## 2021-01-05 RX ADMIN — ENOXAPARIN SODIUM 40 MG: 40 INJECTION SUBCUTANEOUS at 05:43

## 2021-01-05 RX ADMIN — INSULIN GLARGINE 11 UNITS: 100 INJECTION, SOLUTION SUBCUTANEOUS at 17:52

## 2021-01-05 RX ADMIN — INSULIN HUMAN 3 UNITS: 100 INJECTION, SOLUTION PARENTERAL at 21:38

## 2021-01-05 RX ADMIN — GABAPENTIN 400 MG: 400 CAPSULE ORAL at 05:43

## 2021-01-05 RX ADMIN — CARBOXYMETHYLCELLULOSE SODIUM 1 DROP: 5 SOLUTION/ DROPS OPHTHALMIC at 17:52

## 2021-01-05 RX ADMIN — GABAPENTIN 400 MG: 400 CAPSULE ORAL at 17:49

## 2021-01-05 ASSESSMENT — GAIT ASSESSMENTS
DISTANCE (FEET): 40
GAIT LEVEL OF ASSIST: SUPERVISED

## 2021-01-05 ASSESSMENT — COGNITIVE AND FUNCTIONAL STATUS - GENERAL
MOBILITY SCORE: 22
SUGGESTED CMS G CODE MODIFIER MOBILITY: CJ
CLIMB 3 TO 5 STEPS WITH RAILING: A LOT

## 2021-01-05 ASSESSMENT — ENCOUNTER SYMPTOMS
SPUTUM PRODUCTION: 1
CHILLS: 0
BLURRED VISION: 0
DEPRESSION: 0
FEVER: 0
DIARRHEA: 0
ABDOMINAL PAIN: 0
VOMITING: 0
SHORTNESS OF BREATH: 1
SORE THROAT: 0
COUGH: 1
ROS GI COMMENTS: IMPROVED APPETITE
NAUSEA: 0
DIZZINESS: 0
MYALGIAS: 0
HEADACHES: 0
PALPITATIONS: 0

## 2021-01-05 NOTE — DIETARY
Nutrition Services: Update   Day 28 of admit.  Singh Hall is a 55 y.o. male with admitting DX of COVID-19.    Pt is currently on diabetic diet with Boost glucose at meals. PO intake is ranging <25% to 100% with mostly % at meals documented. Pt ate 50-75% at breakfast today and drank % of Boost glucose per CNA. Wt 12/24: 59.3 kg via bed scale - no weight taken since. Weight was stable from 12/18-12/24 and suspect 1st bed scale weight likely incorrect or due to fluid accumulations as she is currently -3.7L fluids per I/O. Pt is currently on 5 L oxygen.     Malnutrition Risk: per RD on 12/18: Pt meets criteria for severe acute illness related malnutrition in the setting of COVID-19 as evidenced by severe 16.5% weight loss in 10 days and energy intake of </=50% for >/=5 days.    Recommendations/Plan:  1. Continue current diet and supplements.   2. Encourage intake of meals  3. Document intake of all meals as % taken in ADL's to provide interdisciplinary communication across all shifts.   4. Monitor weight.  5. Nutrition rep will continue to see patient for ongoing meal and snack preferences.    RD following

## 2021-01-05 NOTE — CARE PLAN
Problem: Nutritional:  Goal: Achieve adequate nutritional intake  Description: Patient will consume 50% of meals + supplements  Outcome: PROGRESSING AS EXPECTED

## 2021-01-05 NOTE — PROGRESS NOTES
Bedside report received and patient care assumed. Pt is lying in bed, AOx4,NC @ 4L with SpO2 93%. Breathing is shallow and unlabored.Tele box on. Bed in lowest position, bed alarm on, and call light within reach. All fall precautions are in place, belongings at bedside table. Hourly rounding initiated.

## 2021-01-05 NOTE — PROGRESS NOTES
"Hospital Medicine Daily Progress Note    Date of Service  1/4/2021    Chief Complaint  55 y.o. male admitted 12/8/2020 with shortness of breath.    Hospital Course  \"55 y.o. male who presented 12/8/2020 with past medical history of chronic neuropathy who presents to the hospital for shortness of breath.  This patient states that his symptoms started about 9 days ago.  He initially started with symptoms of diarrhea and vomiting.  Also with some generalized body aches.  Subsequently started developing shortness of breath with cough congestion and sputum production.  Otherwise no known alleviating or exacerbating factors to his symptoms.  He did get tested for COVID-19 however did not follow-up the test results.  Shortness of breath has gradually worsened over the last several days and now is severe.     In the emergency department is found to be in respiratory distress and placed on high flow nasal cannula and was  admitted to the hospital service for further management of his symptoms\"    Interval Problem Update  \"12/15: On high flow NC (60L at 100%). On day 7 of 10 Decadron.  Tolerating diet.    12/16: Alert but ongoing respiratory distress.  Acute worsening this am with sinus tachycardia on telemetry and worsening oxygen saturation.  An ABG was requested and showed pH:7.46/pCO2:34.5/pO2:48.  I alerted Dr Mayo for possible need of bipap or intubation.   12/17: Still tenuous from a respiratory standpoint.  He is anxious at times and a bit scared.  Used  via iPad to communicate with him this morning.  He remains on 90% FiO2 with 60 L O2.   12/18: On 60L 100% and rapid shallow breathing.  Tearful at times.  Denied pain.  Able to talk in short sentences.  Discussed with Dr Mayo    12/19: Remains on high amounts of oxygen and very weak.  He states he is scared and nervous.  He remains on 60L and 100% via HFNC. I have called his sister and updated her on her brother's status.   12/20 Less respiratory " distress.  Watching TV.  Some episodes on anxiety and RN states the antivan the other day helped patient.  Eating breakfast.   12/21 Looks better today but still very weak.  Oxygen now down to 50L and 50% via HFNC.  Awake and eating breakfast.  Anxious at times.  Watches TV.   12/22: Patient seen and examined by me today.  He was complaining about chest pain but EKG found Q waves in inferior leads but no significant ST segment changes.  He remains on 50 L, 50% FiO2 of high flow nasal cannula.  Continue incentive spirometer and ambulation to wean off oxygen.  In addition, we will start Lantus to help control blood glucose better.   12/23: Patient continues to complain of chest pain..  AK depressions, ST elevations on EKG indicating pericarditis.  Limited cardiac echo ordered to evaluate for pericardial effusion.  In addition I started him on ibuprofen and Cardizem.  Patient did have increasing oxygen requirement today.   12/24: Overnight patient found to be septic protocol was started.  So far cultures have remained negative.  Likely source is a left-sided pneumonia found on chest x-ray.  This is likely cause of his pleuritic chest pain as well.  IV Unasyn and doxycycline has been started.  Since patient still spiking fevers and has lactic acidosis I will transition him to IV Zosyn.   12/25: Patient continues to complain of left-sided pleuritic pain.  Started lidocaine patch and prescribed ibuprofen.  He is taking shallow breaths and chest x-ray found bilateral atelectasis.  Encouraged incentive spirometry and ambulation.  He is having increased leukocytosis will continue IV antibiotics for now.  Cultures are negative to date.  12/26: Patient does have increased leukocytosis.  We will continue to trend this and get a procalcitonin level.  Patient states that his pain is much better controlled but he still taking shallow breaths.  I prescribed him hydrocodone cough syrup.  Blood cultures remain negative to  "date.   12/27: Improving patient's hypoxia today and was found sitting in chair.  I have encouraged aggressive ISS and ambulation.  Patient states that his pain is improving and I encouraged him to take deep breaths.  Patient's procalcitonin is trending downwards.  improvement in patient's WBC count.  12/28: Patient had no acute issues today.  Transitioning from Zosyn to Augmentin.  Encouraged ISS and ambulation.  12/29: Mr. Hall was evaluated and examined in the ICU. He remains on high flow oxygen 70 liters and 90%. He has been up to side of bed though gets quite SOB with exertion. His appetite is poor. Glucose levels low hundreds.  12/30: patient seen and evaluated in the ICU. Blood sugars remain appropriate in the low to mid hundreds. He remains on high flow oxygen. His anxiety has been helped by Xanax.\"  12/31: Patient transferred from ICU overnight.  Remains on HFNC.  No acute complaints this morning.    1/1: Remains on high flow oxygen.  Reported minor nose bleed from high flow oxygen, but otherwise no complaints.  1/2: Oxygen requirements improving today to 6 L,  SBPs 90s-110s; patient without symptoms.    1/3: Oxygen requirements about 6-8 L today.  SBP is 100s-110s; asymptomatic.  Patient eating well.    1/4: Oxygen requirements down to 5 L today at rest.  SBPs 100; remains asymptomatic.  In the afternoon, patient reporting \"eye twitching/itchiness/fatigue.\"  He was neurologically intact unchanged at the time.    Consultants/Specialty  Critical care    Code Status  Full Code    Disposition  Continue on Covid unit.     Review of Systems  Review of Systems   Constitutional: Negative for chills and fever.   Respiratory: Positive for cough (improving), sputum production (improving) and shortness of breath (improving).    Cardiovascular: Negative for chest pain and leg swelling.   Gastrointestinal: Negative for nausea and vomiting.        Improved appetite   Musculoskeletal: Negative for myalgias. "   Neurological: Negative for headaches.   All other systems reviewed and are negative.       Physical Exam  Temp:  [36.1 °C (97 °F)-37 °C (98.6 °F)] 37 °C (98.6 °F)  Pulse:  [76-89] 88  Resp:  [16-26] 17  BP: (100-105)/(61-69) 103/61  SpO2:  [92 %-98 %] 97 %    Physical Exam  Vitals signs and nursing note reviewed.   Constitutional:       General: He is not in acute distress.     Appearance: He is not toxic-appearing.   HENT:      Head: Normocephalic and atraumatic.      Mouth/Throat:      Mouth: Mucous membranes are moist.      Pharynx: Oropharynx is clear.   Eyes:      General: No scleral icterus.     Conjunctiva/sclera: Conjunctivae normal.   Neck:      Musculoskeletal: Normal range of motion and neck supple.   Cardiovascular:      Rate and Rhythm: Normal rate and regular rhythm.      Comments: Distant regular heart sounds  Pulmonary:      Breath sounds: Rales present.      Comments: On 5L oxygen.   Increased work of breathing, improving.   Minimal rales b/l, improving.  Abdominal:      General: Bowel sounds are normal.      Palpations: Abdomen is soft.      Tenderness: There is no abdominal tenderness. There is no guarding or rebound.   Musculoskeletal:      Right lower leg: No edema.      Left lower leg: No edema.   Skin:     General: Skin is warm and dry.   Neurological:      General: No focal deficit present.      Mental Status: He is alert and oriented to person, place, and time. Mental status is at baseline.      Cranial Nerves: No cranial nerve deficit.   Psychiatric:         Mood and Affect: Mood normal.         Behavior: Behavior normal.         Fluids    Intake/Output Summary (Last 24 hours) at 1/4/2021 1852  Last data filed at 1/4/2021 1800  Gross per 24 hour   Intake 720 ml   Output 1950 ml   Net -1230 ml       Laboratory  Recent Labs     01/02/21  0823 01/03/21  1017   WBC 9.1 10.8   RBC 4.12* 4.18*   HEMOGLOBIN 12.0* 12.3*   HEMATOCRIT 37.9* 38.8*   MCV 92.0 92.8   MCH 29.1 29.4   MCHC 31.7* 31.7*    RDW 44.7 46.0   PLATELETCT 813* 796*   MPV 9.1 8.8*     Recent Labs     01/02/21  0823 01/03/21  1017   SODIUM 135 137   POTASSIUM 3.9 3.9   CHLORIDE 97 99   CO2 28 29   GLUCOSE 110* 148*   BUN 13 11   CREATININE 0.63 0.71   CALCIUM 9.2 9.3                   Imaging  EC-ECHOCARDIOGRAM LTD W/O CONT   Final Result      DX-CHEST-PORTABLE (1 VIEW)   Final Result      Persistent right greater left, consolidation consistent with Covid 19 pneumonia.      US-EXTREMITY VENOUS LOWER BILAT   Final Result      DX-CHEST-PORTABLE (1 VIEW)   Final Result      No significant change in Covid pneumonia.      US-EXTREMITY VENOUS LOWER BILAT   Final Result      EC-ECHOCARDIOGRAM LTD W/O CONT   Final Result      DX-CHEST-LIMITED (1 VIEW)   Final Result      No significant change in bilateral pneumonia.      DX-CHEST-LIMITED (1 VIEW)   Final Result         Persistent bilateral pulmonary opacities consistent with Covid 19 pneumonia.      DX-CHEST-PORTABLE (1 VIEW)   Final Result      Bilateral peripheral pulmonary airspace process highly suspicious for Covid pneumonia           Assessment/Plan  * Acute hypoxemic respiratory failure (HCC)- (present on admission)  Assessment & Plan  Secondary to COVID pneumonia.  Oxygen PRN; wean as tolerated.    Pneumonia due to COVID-19 virus  Assessment & Plan  Tested COVID-19 positive on 12/8.  Oxygen requirements starting to improve.  On 5 L now, 1/4/21.  S/p Decadron, remdesivir.  ICS, encourage proning, and mobilization.  Patient was treated with empiric IV antibiotics, which were stopped.  Ultrasound negative for DVT in bilateral lower extremities, echocardiogram reveals normal LVEF.    Anxiety  Assessment & Plan  Ordered Xanax 0.25mg TIDprn   On escitalopram 10mg daily    Elevated d-dimer- (present on admission)  Assessment & Plan  12/16 LE US negative for DVT  Echo from 12/11 reviewed with no right heart failure.    Abnormal LFTs  Assessment & Plan  Suspect due to covid 19   Monitoring CMP (last  checked 12/13)  12/17 LFTs showing improvement.  Likely viral etiology    Aspiration pneumonia (HCC)  Assessment & Plan  Found to have left sided infiltrate  Started on HCAP antibiotics including (Zosyn and doxycyline) and de-escalated to Augmentin/doxy (procalcitonin had been 5 after admit though went down to 0.4 on 12/26.  Completed abx course on 12/30.   Negative blood cultures from 12/23  Respiratory care protocol   Placed on o2 therapy     Type 2 diabetes mellitus with hyperglycemia, without long-term current use of insulin ([facility])  Assessment & Plan  HbA1c 7.2, continue hypoglycemia protocol, accu-checks AC and HS and diabetic diet  Started lantus 11U for better BG control   Blood glucose low hundreds    Sepsis (HCC)  Assessment & Plan  This is Severe Sepsis Present on admission  SIRS criteria identified on my evaluation include: Tachycardia, with heart rate greater than 90 BPM, Tachypnea, with respirations greater than 20 per minute and Leukocytosis, with WBC greater than 12,000  Source of infection is pulmonary  Clinical indicators of end organ dysfunction include Lactate >2 mmol/L (18.0 mg/dL)  Sepsis protocol initiated  Fluid resuscitation ordered per protocol  IV antibiotics as appropriate for source of sepsis  Reassessment: I have reassessed the patient's hemodynamic status  End organ dysfunction include(s):  Acute respiratory failure     Neuropathic pain- (present on admission)  Assessment & Plan  gabapentin       VTE prophylaxis: enoxaparin.

## 2021-01-05 NOTE — PROGRESS NOTES
Monitor summary:      Rhythm:SR  Rate:76-92  Ectopy:rPAC  Measurements: .16/.08/.36        12hr chart check

## 2021-01-05 NOTE — PROGRESS NOTES
[facility] Medicine Daily Progress Note    Date of Service  1/5/2021    Chief Complaint  55 y.o. male admitted 12/8/2020 with shortness of breath.    Hospital Course  Mr. Singh Hall is a 55 y.o. male with history of chronic neuropathy who presented on 12/8/2020 with shortness of breath, productive cough, congestion x 9 days.  Also initially reported some diarrhea, nausea, vomiting, generalized body aches.  On presentation he did require high flow nasal cannula.  Chest x-ray was concerning for Covid.  He was tested as outpatient for Covid but not follow-up on results.  He completed a course of Decadron and remdesivir.  An echocardiogram showed ejection fraction 70%, normal regional wall motion, normal left ventricular chamber size.  The patient did have a fever on 12/23 and was started on Unasyn and doxycycline.  He was also on Zosyn for 5 days as coverage for left-sided pneumonia.  Lower extremity ultrasound was negative for deep venous thrombosis.      Interval Problem Update  Patient was seen and examined at bedside.  I have personally reviewed vitals, labs, and imaging.    1/5.  Afebrile.  Episodes of tachycardia.  On 4-8 L oxygen mask.  Denies fever, chills, chest pains.  Shortness of breath is improving.  Ordered for ambulatory oximetry.    Consultants/Specialty  Critical care    Code Status  Full Code    Disposition  Continue to wean submental oxygen.    Review of Systems  Review of Systems   Constitutional: Negative for chills and fever.   HENT: Negative for congestion and sore throat.    Eyes: Negative for blurred vision.   Respiratory: Positive for cough (improving), sputum production (improving) and shortness of breath (improving).    Cardiovascular: Negative for chest pain, palpitations and leg swelling.   Gastrointestinal: Negative for abdominal pain, diarrhea, nausea and vomiting.        Improved appetite   Genitourinary: Negative for dysuria, frequency and urgency.   Musculoskeletal: Negative for  myalgias.   Skin: Negative for rash.   Neurological: Negative for dizziness and headaches.   Psychiatric/Behavioral: Negative for depression.   All other systems reviewed and are negative.       Physical Exam  Temp:  [36.8 °C (98.3 °F)-37.3 °C (99.1 °F)] 37.2 °C (99 °F)  Pulse:  [72-88] 72  Resp:  [17-20] 18  BP: (100-115)/(61-68) 100/66  SpO2:  [90 %-98 %] 97 %    Physical Exam  Vitals signs and nursing note reviewed.   Constitutional:       General: He is not in acute distress.     Appearance: He is not toxic-appearing.   HENT:      Head: Normocephalic and atraumatic.      Right Ear: External ear normal.      Left Ear: External ear normal.      Nose: Nose normal.      Mouth/Throat:      Mouth: Mucous membranes are moist.      Pharynx: Oropharynx is clear.   Eyes:      General: No scleral icterus.     Conjunctiva/sclera: Conjunctivae normal.   Neck:      Musculoskeletal: Normal range of motion and neck supple.   Cardiovascular:      Rate and Rhythm: Normal rate and regular rhythm.      Pulses: Normal pulses.      Heart sounds: Normal heart sounds. No murmur.      Comments: Distant regular heart sounds  Pulmonary:      Effort: Pulmonary effort is normal. No respiratory distress.      Breath sounds: Rales present. No wheezing.   Abdominal:      General: Bowel sounds are normal.      Palpations: Abdomen is soft.      Tenderness: There is no abdominal tenderness. There is no guarding or rebound.   Musculoskeletal:      Right lower leg: No edema.      Left lower leg: No edema.   Skin:     General: Skin is warm and dry.   Neurological:      General: No focal deficit present.      Mental Status: He is alert and oriented to person, place, and time. Mental status is at baseline.      Cranial Nerves: No cranial nerve deficit.   Psychiatric:         Mood and Affect: Mood normal.         Behavior: Behavior normal.         Fluids    Intake/Output Summary (Last 24 hours) at 1/5/2021 2842  Last data filed at 1/5/2021 0621  Gross  per 24 hour   Intake 720 ml   Output 1550 ml   Net -830 ml       Laboratory  Recent Labs     01/02/21  0823 01/03/21  1017   WBC 9.1 10.8   RBC 4.12* 4.18*   HEMOGLOBIN 12.0* 12.3*   HEMATOCRIT 37.9* 38.8*   MCV 92.0 92.8   MCH 29.1 29.4   MCHC 31.7* 31.7*   RDW 44.7 46.0   PLATELETCT 813* 796*   MPV 9.1 8.8*     Recent Labs     01/02/21  0823 01/03/21  1017   SODIUM 135 137   POTASSIUM 3.9 3.9   CHLORIDE 97 99   CO2 28 29   GLUCOSE 110* 148*   BUN 13 11   CREATININE 0.63 0.71   CALCIUM 9.2 9.3                   Imaging  EC-ECHOCARDIOGRAM LTD W/O CONT   Final Result      DX-CHEST-PORTABLE (1 VIEW)   Final Result      Persistent right greater left, consolidation consistent with Covid 19 pneumonia.      US-EXTREMITY VENOUS LOWER BILAT   Final Result      DX-CHEST-PORTABLE (1 VIEW)   Final Result      No significant change in Covid pneumonia.      US-EXTREMITY VENOUS LOWER BILAT   Final Result      EC-ECHOCARDIOGRAM LTD W/O CONT   Final Result      DX-CHEST-LIMITED (1 VIEW)   Final Result      No significant change in bilateral pneumonia.      DX-CHEST-LIMITED (1 VIEW)   Final Result         Persistent bilateral pulmonary opacities consistent with Covid 19 pneumonia.      DX-CHEST-PORTABLE (1 VIEW)   Final Result      Bilateral peripheral pulmonary airspace process highly suspicious for Covid pneumonia           Assessment/Plan  * Acute hypoxemic respiratory failure (HCC)- (present on admission)  Assessment & Plan  Secondary to COVID pneumonia.  Oxygen PRN; wean as tolerated.    Pneumonia due to COVID-19 virus  Assessment & Plan  Tested COVID-19 positive on 12/8.  Oxygen requirements starting to improve.   S/p Decadron, remdesivir.  ICS, encourage proning, and mobilization.  Completed 2 courses of antibiotics for pneumonia.  Ultrasound negative for DVT in bilateral lower extremities, echocardiogram reveals normal LVEF.    Anxiety  Assessment & Plan  Ordered Xanax 0.25mg TIDprn   On escitalopram 10mg daily    Elevated  d-dimer- (present on admission)  Assessment & Plan  12/16 LE US negative for DVT  Echo from 12/11 reviewed with no right heart failure.    Abnormal LFTs  Assessment & Plan  Suspect due to covid 19   Monitoring CMP (last checked 12/13)  12/17 LFTs showing improvement.  Likely viral etiology    Aspiration pneumonia (HCC)  Assessment & Plan  Found to have left sided infiltrate  Started on HCAP antibiotics including (Zosyn and doxycyline) and de-escalated to Augmentin/doxy (procalcitonin had been 5 after admit though went down to 0.4 on 12/26.  Completed abx course on 12/30.   Negative blood cultures from 12/23  Respiratory care protocol   Placed on o2 therapy     Type 2 diabetes mellitus with hyperglycemia, without long-term current use of insulin ([facility])  Assessment & Plan  Lab Results   Component Value Date/Time    HBA1C 7.3 (H) 12/09/2020 0828    HBA1C 5.6 12/24/2019 0810    HBA1C 5.7 (H) 09/23/2019 1036     Results from last 7 days   Lab Units 01/05/21  1222 01/05/21  0540 01/04/21  2034 01/04/21  1730 01/04/21  1140 01/04/21  0627 01/03/21  2205 01/03/21  1718   ACCU CHECK GLUCOSE 788 mg/dL 159* 129* 148* 165* 130* 116* 150* 104*     I have ordered insulin sliding scale with D50 and glucagon for hypoglycemia per protocol.  Diabetic diet  Diabetic education    Neuropathic pain- (present on admission)  Assessment & Plan  gabapentin        VTE prophylaxis: enoxaparin.

## 2021-01-05 NOTE — PROGRESS NOTES
With patient’s permission (if able), completed daily phone call to designated support person, denisa Zuniga  Discussed patient condition and plan of care. All questions answered.  Follow up requested: N/a

## 2021-01-05 NOTE — PROGRESS NOTES
Bedside report received from nurse. Assumed care of pt. Pt resting comfortably in chair for breakfast. A/Ox4, VSS,  All needs met. POC reviewed and white board updated. Tele box on. Call light in reach. Bed locked in lowest position with 2 upper bed rails up. No pain or complaints

## 2021-01-06 PROBLEM — D75.839 THROMBOCYTOSIS: Status: ACTIVE | Noted: 2021-01-06

## 2021-01-06 LAB
ALBUMIN SERPL BCP-MCNC: 3.4 G/DL (ref 3.2–4.9)
ALBUMIN/GLOB SERPL: 0.9 G/DL
ALP SERPL-CCNC: 243 U/L (ref 30–99)
ALT SERPL-CCNC: 81 U/L (ref 2–50)
ANION GAP SERPL CALC-SCNC: 5 MMOL/L (ref 7–16)
AST SERPL-CCNC: 63 U/L (ref 12–45)
BASOPHILS # BLD AUTO: 1.1 % (ref 0–1.8)
BASOPHILS # BLD: 0.11 K/UL (ref 0–0.12)
BILIRUB SERPL-MCNC: 0.2 MG/DL (ref 0.1–1.5)
BUN SERPL-MCNC: 10 MG/DL (ref 8–22)
CALCIUM SERPL-MCNC: 9 MG/DL (ref 8.5–10.5)
CHLORIDE SERPL-SCNC: 99 MMOL/L (ref 96–112)
CO2 SERPL-SCNC: 31 MMOL/L (ref 20–33)
CREAT SERPL-MCNC: 0.71 MG/DL (ref 0.5–1.4)
EOSINOPHIL # BLD AUTO: 0.48 K/UL (ref 0–0.51)
EOSINOPHIL NFR BLD: 4.7 % (ref 0–6.9)
ERYTHROCYTE [DISTWIDTH] IN BLOOD BY AUTOMATED COUNT: 46.3 FL (ref 35.9–50)
GLOBULIN SER CALC-MCNC: 3.7 G/DL (ref 1.9–3.5)
GLUCOSE BLD-MCNC: 115 MG/DL (ref 65–99)
GLUCOSE BLD-MCNC: 123 MG/DL (ref 65–99)
GLUCOSE BLD-MCNC: 132 MG/DL (ref 65–99)
GLUCOSE SERPL-MCNC: 116 MG/DL (ref 65–99)
HAV IGM SERPL QL IA: NORMAL
HBV CORE IGM SER QL: NORMAL
HBV SURFACE AG SER QL: NORMAL
HCT VFR BLD AUTO: 40.6 % (ref 42–52)
HCV AB SER QL: NORMAL
HGB BLD-MCNC: 12.8 G/DL (ref 14–18)
IMM GRANULOCYTES # BLD AUTO: 0.19 K/UL (ref 0–0.11)
IMM GRANULOCYTES NFR BLD AUTO: 1.9 % (ref 0–0.9)
LYMPHOCYTES # BLD AUTO: 3.87 K/UL (ref 1–4.8)
LYMPHOCYTES NFR BLD: 38.1 % (ref 22–41)
MAGNESIUM SERPL-MCNC: 2.1 MG/DL (ref 1.5–2.5)
MCH RBC QN AUTO: 29.2 PG (ref 27–33)
MCHC RBC AUTO-ENTMCNC: 31.5 G/DL (ref 33.7–35.3)
MCV RBC AUTO: 92.7 FL (ref 81.4–97.8)
MONOCYTES # BLD AUTO: 0.97 K/UL (ref 0–0.85)
MONOCYTES NFR BLD AUTO: 9.6 % (ref 0–13.4)
NEUTROPHILS # BLD AUTO: 4.53 K/UL (ref 1.82–7.42)
NEUTROPHILS NFR BLD: 44.6 % (ref 44–72)
NRBC # BLD AUTO: 0 K/UL
NRBC BLD-RTO: 0 /100 WBC
PHOSPHATE SERPL-MCNC: 3.4 MG/DL (ref 2.5–4.5)
PLATELET # BLD AUTO: 707 K/UL (ref 164–446)
PMV BLD AUTO: 8.9 FL (ref 9–12.9)
POTASSIUM SERPL-SCNC: 4.2 MMOL/L (ref 3.6–5.5)
PROT SERPL-MCNC: 7.1 G/DL (ref 6–8.2)
RBC # BLD AUTO: 4.38 M/UL (ref 4.7–6.1)
SODIUM SERPL-SCNC: 135 MMOL/L (ref 135–145)
WBC # BLD AUTO: 10.2 K/UL (ref 4.8–10.8)

## 2021-01-06 PROCEDURE — 85025 COMPLETE CBC W/AUTO DIFF WBC: CPT

## 2021-01-06 PROCEDURE — 770014 HCHG ROOM/CARE - WARD (150)

## 2021-01-06 PROCEDURE — 80053 COMPREHEN METABOLIC PANEL: CPT

## 2021-01-06 PROCEDURE — 82962 GLUCOSE BLOOD TEST: CPT | Mod: 91

## 2021-01-06 PROCEDURE — 700111 HCHG RX REV CODE 636 W/ 250 OVERRIDE (IP): Performed by: EMERGENCY MEDICINE

## 2021-01-06 PROCEDURE — 99233 SBSQ HOSP IP/OBS HIGH 50: CPT | Performed by: INTERNAL MEDICINE

## 2021-01-06 PROCEDURE — 97535 SELF CARE MNGMENT TRAINING: CPT

## 2021-01-06 PROCEDURE — 770021 HCHG ROOM/CARE - ISO PRIVATE

## 2021-01-06 PROCEDURE — 700101 HCHG RX REV CODE 250: Performed by: HOSPITALIST

## 2021-01-06 PROCEDURE — 97530 THERAPEUTIC ACTIVITIES: CPT

## 2021-01-06 PROCEDURE — 80074 ACUTE HEPATITIS PANEL: CPT

## 2021-01-06 PROCEDURE — 36415 COLL VENOUS BLD VENIPUNCTURE: CPT

## 2021-01-06 PROCEDURE — 700102 HCHG RX REV CODE 250 W/ 637 OVERRIDE(OP): Performed by: INTERNAL MEDICINE

## 2021-01-06 PROCEDURE — 84100 ASSAY OF PHOSPHORUS: CPT

## 2021-01-06 PROCEDURE — A9270 NON-COVERED ITEM OR SERVICE: HCPCS | Performed by: INTERNAL MEDICINE

## 2021-01-06 PROCEDURE — 83735 ASSAY OF MAGNESIUM: CPT

## 2021-01-06 PROCEDURE — 700102 HCHG RX REV CODE 250 W/ 637 OVERRIDE(OP): Performed by: HOSPITALIST

## 2021-01-06 PROCEDURE — A9270 NON-COVERED ITEM OR SERVICE: HCPCS | Performed by: HOSPITALIST

## 2021-01-06 RX ADMIN — LIDOCAINE 1 PATCH: 50 PATCH TOPICAL at 11:56

## 2021-01-06 RX ADMIN — ESCITALOPRAM OXALATE 10 MG: 10 TABLET ORAL at 05:16

## 2021-01-06 RX ADMIN — CARBOXYMETHYLCELLULOSE SODIUM 1 DROP: 5 SOLUTION/ DROPS OPHTHALMIC at 05:16

## 2021-01-06 RX ADMIN — ENOXAPARIN SODIUM 40 MG: 40 INJECTION SUBCUTANEOUS at 05:15

## 2021-01-06 RX ADMIN — GABAPENTIN 400 MG: 400 CAPSULE ORAL at 05:16

## 2021-01-06 RX ADMIN — OMEPRAZOLE 20 MG: 20 CAPSULE, DELAYED RELEASE ORAL at 05:16

## 2021-01-06 RX ADMIN — GABAPENTIN 400 MG: 400 CAPSULE ORAL at 18:06

## 2021-01-06 RX ADMIN — INSULIN GLARGINE 11 UNITS: 100 INJECTION, SOLUTION SUBCUTANEOUS at 18:08

## 2021-01-06 ASSESSMENT — FIBROSIS 4 INDEX
FIB4 SCORE: 0.52
FIB4 SCORE: 0.52

## 2021-01-06 ASSESSMENT — ENCOUNTER SYMPTOMS
ROS GI COMMENTS: IMPROVED APPETITE
DEPRESSION: 0
DIZZINESS: 0
FEVER: 0
DIARRHEA: 0
VOMITING: 0
CHILLS: 0
ABDOMINAL PAIN: 0
SPUTUM PRODUCTION: 1
SORE THROAT: 0
COUGH: 1
MYALGIAS: 0
PALPITATIONS: 0
SHORTNESS OF BREATH: 1
HEADACHES: 0
NAUSEA: 0
BLURRED VISION: 0

## 2021-01-06 ASSESSMENT — COGNITIVE AND FUNCTIONAL STATUS - GENERAL
HELP NEEDED FOR BATHING: A LITTLE
DAILY ACTIVITIY SCORE: 21
TOILETING: A LITTLE
SUGGESTED CMS G CODE MODIFIER DAILY ACTIVITY: CJ
DRESSING REGULAR LOWER BODY CLOTHING: A LITTLE

## 2021-01-06 NOTE — PROGRESS NOTES
[facility] Medicine Daily Progress Note    Date of Service  1/6/2021    Chief Complaint  55 y.o. male admitted 12/8/2020 with shortness of breath.    Hospital Course  Mr. Singh Hall is a 55 y.o. male with history of chronic neuropathy who presented on 12/8/2020 with shortness of breath, productive cough, congestion x 9 days.  Also initially reported some diarrhea, nausea, vomiting, generalized body aches.  On presentation he did require high flow nasal cannula.  Chest x-ray was concerning for Covid.  He was tested as outpatient for Covid but not follow-up on results.  He completed a course of Decadron and remdesivir.  An echocardiogram showed ejection fraction 70%, normal regional wall motion, normal left ventricular chamber size.  The patient did have a fever on 12/23 and was started on Unasyn and doxycycline.  He was also on Zosyn for 5 days as coverage for left-sided pneumonia.  Lower extremity ultrasound was negative for deep venous thrombosis.      Interval Problem Update  Patient was seen and examined at bedside.  I have personally reviewed vitals, labs, and imaging.    1/5.  Afebrile.  Episodes of tachycardia.  On 4-8 L oxygen mask.  Denies fever, chills, chest pains.  Shortness of breath is improving.  Ordered for ambulatory oximetry.  1/6.  Afebrile.  Episodes of hypotension.  On 4-5 L oxygen mask.  Denies fevers, chills, chest pains.  Shortness of breath is improving.  He is eating and drinking better.  Denies dizziness, lightheadedness.  Ordered for ambulatory oximetry.  Patient still feels weak but shortness of breath is improved.  He does have episodes of hypotension mostly in the morning but is asymptomatic.  Check morning cortisol for adrenal insufficiency.  Consider midodrine if he does start having issues.  He is okay for alternate care site    Consultants/Specialty  Critical care    Code Status  Full Code    Disposition  Continue to wean supplemental oxygen.    Review of Systems  Review of Systems    Constitutional: Negative for chills and fever.   HENT: Negative for congestion and sore throat.    Eyes: Negative for blurred vision.   Respiratory: Positive for cough (improving), sputum production (improving) and shortness of breath (improving).    Cardiovascular: Negative for chest pain, palpitations and leg swelling.   Gastrointestinal: Negative for abdominal pain, diarrhea, nausea and vomiting.        Improved appetite   Genitourinary: Negative for dysuria, frequency and urgency.   Musculoskeletal: Negative for myalgias.   Skin: Negative for rash.   Neurological: Negative for dizziness and headaches.   Psychiatric/Behavioral: Negative for depression.   All other systems reviewed and are negative.       Physical Exam  Temp:  [36.7 °C (98.1 °F)-37.2 °C (99 °F)] 36.9 °C (98.5 °F)  Pulse:  [] 76  Resp:  [18] 18  BP: ()/(59-71) 98/62  SpO2:  [83 %-97 %] 96 %    Physical Exam  Vitals signs and nursing note reviewed.   Constitutional:       General: He is not in acute distress.     Appearance: He is not toxic-appearing.   HENT:      Head: Normocephalic and atraumatic.      Right Ear: External ear normal.      Left Ear: External ear normal.      Nose: Nose normal.      Mouth/Throat:      Mouth: Mucous membranes are moist.      Pharynx: Oropharynx is clear.   Eyes:      General: No scleral icterus.     Conjunctiva/sclera: Conjunctivae normal.   Neck:      Musculoskeletal: Normal range of motion and neck supple.   Cardiovascular:      Rate and Rhythm: Normal rate and regular rhythm.      Pulses: Normal pulses.      Heart sounds: Normal heart sounds. No murmur.      Comments: Distant regular heart sounds  Pulmonary:      Effort: Pulmonary effort is normal. No respiratory distress.      Breath sounds: Rales present. No wheezing.   Abdominal:      General: Bowel sounds are normal.      Palpations: Abdomen is soft.      Tenderness: There is no abdominal tenderness. There is no guarding or rebound.    Musculoskeletal:      Right lower leg: No edema.      Left lower leg: No edema.   Skin:     General: Skin is warm and dry.   Neurological:      General: No focal deficit present.      Mental Status: He is alert and oriented to person, place, and time. Mental status is at baseline.      Cranial Nerves: No cranial nerve deficit.   Psychiatric:         Mood and Affect: Mood normal.         Behavior: Behavior normal.         Fluids    Intake/Output Summary (Last 24 hours) at 1/6/2021 0751  Last data filed at 1/6/2021 0600  Gross per 24 hour   Intake 1680 ml   Output 800 ml   Net 880 ml       Laboratory  Recent Labs     01/03/21  1017   WBC 10.8   RBC 4.18*   HEMOGLOBIN 12.3*   HEMATOCRIT 38.8*   MCV 92.8   MCH 29.4   MCHC 31.7*   RDW 46.0   PLATELETCT 796*   MPV 8.8*     Recent Labs     01/03/21  1017   SODIUM 137   POTASSIUM 3.9   CHLORIDE 99   CO2 29   GLUCOSE 148*   BUN 11   CREATININE 0.71   CALCIUM 9.3                   Imaging  EC-ECHOCARDIOGRAM LTD W/O CONT   Final Result      DX-CHEST-PORTABLE (1 VIEW)   Final Result      Persistent right greater left, consolidation consistent with Covid 19 pneumonia.      US-EXTREMITY VENOUS LOWER BILAT   Final Result      DX-CHEST-PORTABLE (1 VIEW)   Final Result      No significant change in Covid pneumonia.      US-EXTREMITY VENOUS LOWER BILAT   Final Result      EC-ECHOCARDIOGRAM LTD W/O CONT   Final Result      DX-CHEST-LIMITED (1 VIEW)   Final Result      No significant change in bilateral pneumonia.      DX-CHEST-LIMITED (1 VIEW)   Final Result         Persistent bilateral pulmonary opacities consistent with Covid 19 pneumonia.      DX-CHEST-PORTABLE (1 VIEW)   Final Result      Bilateral peripheral pulmonary airspace process highly suspicious for Covid pneumonia           Assessment/Plan  * Acute hypoxemic respiratory failure (HCC)- (present on admission)  Assessment & Plan  Secondary to COVID pneumonia.  Oxygen PRN; wean as tolerated.  Has completed course of  Decadron and remdesivir.  Management as above    Pneumonia due to COVID-19 virus- (present on admission)  Assessment & Plan  Tested COVID-19 positive on 12/8.  Oxygen requirements starting to improve.   S/p Decadron, remdesivir.  ICS, encourage proning, and mobilization.  Completed 2 courses of antibiotics for pneumonia.  Ultrasound negative for DVT in bilateral lower extremities, echocardiogram reveals normal LVEF.    Anxiety  Assessment & Plan  Ordered Xanax 0.25mg TIDprn   On escitalopram 10mg daily    Elevated d-dimer- (present on admission)  Assessment & Plan  12/16 LE US negative for DVT  Echo from 12/11 reviewed with no right heart failure.    Abnormal LFTs  Assessment & Plan  Suspect due to covid 19   Monitoring CMP (last checked 12/13)  12/17 LFTs showing improvement.  Likely viral etiology    Thrombocytosis (HCC)  Assessment & Plan  Trending down.  Likely reactive.  No active signs of bleeding or bruising  Continue to monitor all cell lines with CBC.    Aspiration pneumonia (HCC)  Assessment & Plan  Found to have left sided infiltrate  Started on HCAP antibiotics including (Zosyn and doxycyline) and de-escalated to Augmentin/doxy (procalcitonin had been 5 after admit though went down to 0.4 on 12/26.  Completed abx course on 12/30.   Negative blood cultures from 12/23  Respiratory care protocol   Placed on o2 therapy     Type 2 diabetes mellitus with hyperglycemia, without long-term current use of insulin (HCC)  Assessment & Plan  Lab Results   Component Value Date/Time    HBA1C 7.3 (H) 12/09/2020 0828    HBA1C 5.6 12/24/2019 0810    HBA1C 5.7 (H) 09/23/2019 1036     Results from last 7 days   Lab Units 01/06/21  0513 01/05/21  2138 01/05/21  1751 01/05/21  1222 01/05/21  0540 01/04/21  2034 01/04/21  1730 01/04/21  1140   ACCU CHECK GLUCOSE 788 mg/dL 115* 151* 151* 159* 129* 148* 165* 130*     I have ordered insulin sliding scale with D50 and glucagon for hypoglycemia per protocol.  Diabetic  diet  Diabetic education    Neuropathic pain- (present on admission)  Assessment & Plan  gabapentin        VTE prophylaxis: enoxaparin.

## 2021-01-06 NOTE — THERAPY
"Occupational Therapy  Daily Treatment     Patient Name: Singh Hall  Age:  55 y.o., Sex:  male  Medical Record #: [id number]  Today's Date: 1/6/2021     Precautions  Precautions: Fall Risk  Comments: anxious, desats with activity, with  pt able to understand cues for breathing    Assessment    Pt seen for follow up OT tx session, pt making gains with therapy but continues to be limited by tachypneic breathing, anxiety with SOB but overall doing better with activity tolerance and strength. Pt able to participate in multiple bouts of ambulation as well as bathroom mobility, steady on feet without an AD. Will continue to see for skilled therapy as well as recommend HH.    Plan    Continue current treatment plan.    DC Equipment Recommendations: Unable to determine at this time  Discharge Recommendations: Recommend home health for continued occupational therapy services    Subjective    \"I'm tired very quick\"     Objective       01/06/21 0821   Precautions   Precautions Fall Risk   Comments anxious, desats with activity, with  pt able tunderstand cues for breathing   Cognition    Cognition / Consciousness WDL   Level of Consciousness Alert   New Learning Other (See Comments)   Attention Other (See Comments)   Comments anxious, starts having tachypneic breathing patterns   Active ROM Upper Body   Active ROM Upper Body  WDL   Strength Upper Body   Upper Body Strength  WDL   Other Treatments   Other Treatments Provided cues for breathing to increase oxygen saturation levels, oxygen demand increased to 5L after ambulation due to O2 sats 78-82%   Balance   Sitting Balance (Static) Fair +   Sitting Balance (Dynamic) Fair +   Standing Balance (Static) Fair +   Standing Balance (Dynamic) Fair   Weight Shift Sitting Fair   Weight Shift Standing Fair   Skilled Intervention Verbal Cuing;Postural Facilitation   Comments no AD   Bed Mobility    Supine to Sit Supervised   Sit to Supine Supervised   Scooting " Supervised   Rolling Supervised   Activities of Daily Living   Grooming Supervision;Standing   Upper Body Dressing Supervision   Lower Body Dressing Supervision   Toileting Supervision   Skilled Intervention Postural Facilitation   How much help from another person does the patient currently need...   Putting on and taking off regular lower body clothing? 3   Bathing (including washing, rinsing, and drying)? 3   Toileting, which includes using a toilet, bedpan, or urinal? 3   Putting on and taking off regular upper body clothing? 4   Taking care of personal grooming such as brushing teeth? 4   Eating meals? 4   6 Clicks Daily Activity Score 21   Functional Mobility   Sit to Stand Supervised   Bed, Chair, Wheelchair Transfer Supervised   Toilet Transfers Supervised   Transfer Method Stand Step   Mobility bed mobility, ambulation within room, bathroom mobility, ambulation in room transfer to chair then back to bed  (rest breaks between activity)   Activity Tolerance   Sitting in Chair 10   Sitting Edge of Bed 10   Standing 10   Comments limited by O2 desat/anxiety   Patient / Family Goals   Patient / Family Goal #1 breath better   Goal #1 Outcome Progressing as expected   Short Term Goals   Short Term Goal # 1 Pt will maintain O2 sats >90% during ADL's   Goal Outcome # 1 Progressing as expected   Short Term Goal # 2 Pt will amb to bathroom with supervision   Goal Outcome # 2 Progressing as expected   Short Term Goal # 3 Pt will dress LB with supervision   Goal Outcome # 3 Progressing as expected   Short Term Goal # 4 Pt will demonstrate good energy conservation techniques during ADL's   Goal Outcome # 4 Progressing slower than expected   Education Group   Education Provided Role of Occupational Therapist   Role of Occupational Therapist Patient Response Patient;Acceptance;Explanation   Interdisciplinary Plan of Care Collaboration   IDT Collaboration with  Nursing   Patient Position at End of Therapy Seated;Call Light  within Reach;Tray Table within Reach;Phone within Reach   Collaboration Comments RN updated

## 2021-01-06 NOTE — ASSESSMENT & PLAN NOTE
Trending down.  Likely reactive.  No active signs of bleeding or bruising  Continue to monitor all cell lines with CBC.

## 2021-01-06 NOTE — THERAPY
Physical Therapy   Daily Treatment     Patient Name: Singh Hall  Age:  55 y.o., Sex:  male  Medical Record #: [id number]  Today's Date: 1/5/2021     Precautions: Fall Risk    Assessment    Pt remains limited by respiratory status and anxiety, taking rapid shallow breaths during ambulation, desaturating to 83% with HR of 125 after walking 40 feet on 4L oxymask. Pt is unable to follow cues to slow his breathing and to try to take deeper breaths. Patient reports that he lives with his Father who is gone to adult day care most days and is fragile and unable to help. PT to follow.     Plan    Continue current treatment plan.    DC Equipment Recommendations: Unable to determine at this time  Discharge Recommendations: Other -Pt remains strong enough to ambulate, but desaturates with activity and will need to be more tolerant of activity before he can d/c home, therefore, will need post acute placement.         Objective       01/05/21 1515   Vitals   Pulse (!) 125   Pulse Oximetry (!) 83 %   O2 (LPM) 4   O2 Delivery Device Oxymask   Vitals Comments shallow, rapid breathing with ambulation, unable to follow cues to slow and deeper breaths.    Cognition    Level of Consciousness Alert   New Learning Impaired   Attention Impaired   Comments anxious, poor understanding of education.   Gait Analysis   Gait Level Of Assist Supervised   Assistive Device None   Distance (Feet) 40   Bed Mobility    Scooting Supervised   Functional Mobility   Sit to Stand Supervised   Bed, Chair, Wheelchair Transfer Supervised   Transfer Method Stand Pivot   Comments needed extra time to recover   Short Term Goals    Short Term Goal # 1 Pt will amb x50ft with SPV while negotiating supplemental O2 tank (if needed for DC) within 6 visits to [facility] after DC.   Goal Outcome # 1 goal not met   Short Term Goal # 2 Pt will ascend/descend 1 step with SPV within 6 visits to enter/exit home at DC.   Goal Outcome # 2 Goal not met   Anticipated  Discharge Equipment and Recommendations   DC Equipment Recommendations Unable to determine at this time   Discharge Recommendations Other -

## 2021-01-06 NOTE — PROGRESS NOTES
Monitor summary:      Rhythm: SR  Rate: 74-89  Ectopy: none  Measurements: .16/.08/ .38      12hr chart check

## 2021-01-06 NOTE — PROGRESS NOTES
Bedside report received and patient care assumed. Pt is lying in bed, AOx4,  and is on NC @ 4L with SpO2 96%. Breathing is even and unlabored.Tele box on. Bed in lowest position, bed alarm on, and call light within reach. All fall precautions are in place, belongings at bedside table. Hourly rounding initiated.

## 2021-01-07 LAB
ANION GAP SERPL CALC-SCNC: 10 MMOL/L (ref 7–16)
BASE EXCESS BLDV CALC-SCNC: 4 MMOL/L
BASOPHILS # BLD AUTO: 0.6 % (ref 0–1.8)
BASOPHILS # BLD: 0.07 K/UL (ref 0–0.12)
BODY TEMPERATURE: ABNORMAL CENTIGRADE
BUN SERPL-MCNC: 13 MG/DL (ref 8–22)
CALCIUM SERPL-MCNC: 9.1 MG/DL (ref 8.5–10.5)
CHLORIDE SERPL-SCNC: 100 MMOL/L (ref 96–112)
CO2 SERPL-SCNC: 28 MMOL/L (ref 20–33)
CORTIS SERPL-MCNC: 10.6 UG/DL (ref 0–23)
CREAT SERPL-MCNC: 0.69 MG/DL (ref 0.5–1.4)
EOSINOPHIL # BLD AUTO: 0.32 K/UL (ref 0–0.51)
EOSINOPHIL NFR BLD: 2.9 % (ref 0–6.9)
ERYTHROCYTE [DISTWIDTH] IN BLOOD BY AUTOMATED COUNT: 45.7 FL (ref 35.9–50)
GLUCOSE BLD-MCNC: 118 MG/DL (ref 65–99)
GLUCOSE BLD-MCNC: 126 MG/DL (ref 65–99)
GLUCOSE BLD-MCNC: 127 MG/DL (ref 65–99)
GLUCOSE BLD-MCNC: 135 MG/DL (ref 65–99)
GLUCOSE BLD-MCNC: 146 MG/DL (ref 65–99)
GLUCOSE SERPL-MCNC: 127 MG/DL (ref 65–99)
HCO3 BLDV-SCNC: 29 MMOL/L (ref 24–28)
HCT VFR BLD AUTO: 40 % (ref 42–52)
HGB BLD-MCNC: 12.6 G/DL (ref 14–18)
IMM GRANULOCYTES # BLD AUTO: 0.21 K/UL (ref 0–0.11)
IMM GRANULOCYTES NFR BLD AUTO: 1.9 % (ref 0–0.9)
LYMPHOCYTES # BLD AUTO: 4.28 K/UL (ref 1–4.8)
LYMPHOCYTES NFR BLD: 38.4 % (ref 22–41)
MAGNESIUM SERPL-MCNC: 2.1 MG/DL (ref 1.5–2.5)
MCH RBC QN AUTO: 29.2 PG (ref 27–33)
MCHC RBC AUTO-ENTMCNC: 31.5 G/DL (ref 33.7–35.3)
MCV RBC AUTO: 92.8 FL (ref 81.4–97.8)
MONOCYTES # BLD AUTO: 1.09 K/UL (ref 0–0.85)
MONOCYTES NFR BLD AUTO: 9.8 % (ref 0–13.4)
NEUTROPHILS # BLD AUTO: 5.19 K/UL (ref 1.82–7.42)
NEUTROPHILS NFR BLD: 46.4 % (ref 44–72)
NRBC # BLD AUTO: 0 K/UL
NRBC BLD-RTO: 0 /100 WBC
PCO2 BLDV: 48.7 MMHG (ref 41–51)
PH BLDV: 7.4 [PH] (ref 7.31–7.45)
PHOSPHATE SERPL-MCNC: 3.4 MG/DL (ref 2.5–4.5)
PLATELET # BLD AUTO: 597 K/UL (ref 164–446)
PMV BLD AUTO: 8.9 FL (ref 9–12.9)
PO2 BLDV: 38.5 MMHG (ref 25–40)
POTASSIUM SERPL-SCNC: 4.1 MMOL/L (ref 3.6–5.5)
RBC # BLD AUTO: 4.31 M/UL (ref 4.7–6.1)
SAO2 % BLDV: 70.5 %
SODIUM SERPL-SCNC: 138 MMOL/L (ref 135–145)
WBC # BLD AUTO: 11.2 K/UL (ref 4.8–10.8)

## 2021-01-07 PROCEDURE — 700111 HCHG RX REV CODE 636 W/ 250 OVERRIDE (IP): Performed by: EMERGENCY MEDICINE

## 2021-01-07 PROCEDURE — 82803 BLOOD GASES ANY COMBINATION: CPT

## 2021-01-07 PROCEDURE — 94760 N-INVAS EAR/PLS OXIMETRY 1: CPT

## 2021-01-07 PROCEDURE — 84100 ASSAY OF PHOSPHORUS: CPT

## 2021-01-07 PROCEDURE — 99233 SBSQ HOSP IP/OBS HIGH 50: CPT | Performed by: STUDENT IN AN ORGANIZED HEALTH CARE EDUCATION/TRAINING PROGRAM

## 2021-01-07 PROCEDURE — 83735 ASSAY OF MAGNESIUM: CPT

## 2021-01-07 PROCEDURE — 700101 HCHG RX REV CODE 250: Performed by: HOSPITALIST

## 2021-01-07 PROCEDURE — 770014 HCHG ROOM/CARE - WARD (150)

## 2021-01-07 PROCEDURE — A9270 NON-COVERED ITEM OR SERVICE: HCPCS | Performed by: HOSPITALIST

## 2021-01-07 PROCEDURE — 36415 COLL VENOUS BLD VENIPUNCTURE: CPT

## 2021-01-07 PROCEDURE — A9270 NON-COVERED ITEM OR SERVICE: HCPCS | Performed by: INTERNAL MEDICINE

## 2021-01-07 PROCEDURE — 80048 BASIC METABOLIC PNL TOTAL CA: CPT

## 2021-01-07 PROCEDURE — 700102 HCHG RX REV CODE 250 W/ 637 OVERRIDE(OP): Performed by: INTERNAL MEDICINE

## 2021-01-07 PROCEDURE — 85025 COMPLETE CBC W/AUTO DIFF WBC: CPT

## 2021-01-07 PROCEDURE — 700102 HCHG RX REV CODE 250 W/ 637 OVERRIDE(OP): Performed by: HOSPITALIST

## 2021-01-07 PROCEDURE — 82533 TOTAL CORTISOL: CPT

## 2021-01-07 PROCEDURE — 82962 GLUCOSE BLOOD TEST: CPT

## 2021-01-07 RX ADMIN — INSULIN GLARGINE 11 UNITS: 100 INJECTION, SOLUTION SUBCUTANEOUS at 17:03

## 2021-01-07 RX ADMIN — GABAPENTIN 400 MG: 400 CAPSULE ORAL at 05:17

## 2021-01-07 RX ADMIN — ESCITALOPRAM OXALATE 10 MG: 10 TABLET ORAL at 05:17

## 2021-01-07 RX ADMIN — OMEPRAZOLE 20 MG: 20 CAPSULE, DELAYED RELEASE ORAL at 05:17

## 2021-01-07 RX ADMIN — ENOXAPARIN SODIUM 40 MG: 40 INJECTION SUBCUTANEOUS at 05:17

## 2021-01-07 RX ADMIN — LIDOCAINE 1 PATCH: 50 PATCH TOPICAL at 10:23

## 2021-01-07 ASSESSMENT — ENCOUNTER SYMPTOMS
SPUTUM PRODUCTION: 1
DIARRHEA: 0
BLURRED VISION: 0
NAUSEA: 0
MYALGIAS: 0
PALPITATIONS: 0
CHILLS: 0
DEPRESSION: 0
VOMITING: 0
FEVER: 0
SHORTNESS OF BREATH: 1
SORE THROAT: 0
DIZZINESS: 0
COUGH: 1
ROS GI COMMENTS: IMPROVED APPETITE
HEADACHES: 0
ABDOMINAL PAIN: 0

## 2021-01-07 ASSESSMENT — PAIN DESCRIPTION - PAIN TYPE: TYPE: ACUTE PAIN

## 2021-01-07 NOTE — PROGRESS NOTES
Pharmacy Pharmacotherapy Consult for LOS >30 days    Admit Date: 12/8/2020      Medications were reviewed for appropriateness and ongoing need.     Current Facility-Administered Medications   Medication Dose Route Frequency Provider Last Rate Last Admin   • carboxymethylcellulose (REFRESH TEARS) 0.5 % ophthalmic drops 1 Drop  1 Drop Both Eyes PRN Yuli Yip M.D.   1 Drop at 01/06/21 0516   • sodium chloride (OCEAN) 0.65 % nasal spray 2 Spray  2 Spray Nasal Q2HRS PRN Deana Hardin M.D.   2 Spray at 01/04/21 0536   • lidocaine (LIDODERM) 5 % 1 Patch  1 Patch Transdermal Q24HR Deana Hardin M.D.   1 Patch at 01/07/21 1023   • insulin glargine (Lantus) injection  0.2 Units/kg/day Subcutaneous Q EVENING Deana Hardin M.D.   11 Units at 01/06/21 1808   • insulin regular (HumuLIN R,NovoLIN R) injection  3-14 Units Subcutaneous 4X/DAY ACHS Alexys Mayo M.D.   Stopped at 01/06/21 0700    And   • glucose 4 g chewable tablet 16 g  16 g Oral Q15 MIN PRN Alexys Mayo M.D.        And   • dextrose 50% (D50W) injection 50 mL  50 mL Intravenous Q15 MIN PRN Alexys Mayo M.D.       • enoxaparin (LOVENOX) inj 40 mg  40 mg Subcutaneous DAILY Dg Grant M.D.   40 mg at 01/07/21 0517   • omeprazole (PRILOSEC) capsule 20 mg  20 mg Oral DAILY Jax Schroeder Jr., D.O.   20 mg at 01/07/21 0517   • escitalopram (Lexapro) tablet 10 mg  10 mg Oral Daily-0600 Mireille Wyatt M.D.   10 mg at 01/07/21 0517   • gabapentin (NEURONTIN) capsule 400 mg  400 mg Oral BID Mireille Wyatt M.D.   400 mg at 01/07/21 0517   • acetaminophen (Tylenol) tablet 650 mg  650 mg Oral Q6HRS PRN Asif Villafuerte M.D.   650 mg at 12/23/20 1718   • Respiratory Therapy Consult   Nebulization Continuous RT Asif Villafuerte M.D.           Recommendations:    No recommendations at this time.     Arpit Kraus, Pharmacy Intern

## 2021-01-07 NOTE — PROGRESS NOTES
[facility] Medicine Daily Progress Note    Date of Service  1/7/2021    Chief Complaint  55 y.o. male admitted 12/8/2020 with shortness of breath.    Hospital Course  Mr. Singh Hall is a 55 y.o. male with history of chronic neuropathy who presented on 12/8/2020 with shortness of breath, productive cough, congestion x 9 days.  Also initially reported some diarrhea, nausea, vomiting, generalized body aches.  On presentation he did require high flow nasal cannula.  Chest x-ray was concerning for Covid.  He was tested as outpatient for Covid but not follow-up on results.  He completed a course of Decadron and Remdesivir.  An echocardiogram showed ejection fraction 70%, normal regional wall motion, normal left ventricular chamber size.  The patient did have a fever on 12/23 and was started on Unasyn and doxycycline.  He was also on Zosyn for 5 days as coverage for left-sided pneumonia.  Lower extremity ultrasound was negative for deep venous thrombosis.    Interval Problem Update  Patient evaluated bedside and in no acute distress  Requiring 4 L nasal cannula to maintain saturations  Acutely desaturates with mild exertion and walking to the bathroom  Titrate oxygen as tolerated  Frequent pronation incentive spirometer  Up to chair for all meals    Consultants/Specialty  Critical care    Code Status  Full Code    Disposition  Continue to wean submental oxygen.    Review of Systems  Review of Systems   Constitutional: Negative for chills and fever.   HENT: Negative for congestion and sore throat.    Eyes: Negative for blurred vision.   Respiratory: Positive for cough (improving), sputum production (improving) and shortness of breath (improving).    Cardiovascular: Negative for chest pain, palpitations and leg swelling.   Gastrointestinal: Negative for abdominal pain, diarrhea, nausea and vomiting.        Improved appetite   Genitourinary: Negative for dysuria, frequency and urgency.   Musculoskeletal: Negative for  myalgias.   Skin: Negative for rash.   Neurological: Negative for dizziness and headaches.   Psychiatric/Behavioral: Negative for depression.   All other systems reviewed and are negative.       Physical Exam  Temp:  [36.5 °C (97.7 °F)-37.1 °C (98.8 °F)] 36.5 °C (97.7 °F)  Pulse:  [59-94] 79  Resp:  [15-20] 18  BP: ()/(59-72) 104/71  SpO2:  [91 %-100 %] 98 %    Physical Exam  Vitals signs and nursing note reviewed.   Constitutional:       General: He is not in acute distress.     Appearance: He is not toxic-appearing.   HENT:      Head: Normocephalic and atraumatic.      Right Ear: External ear normal.      Left Ear: External ear normal.      Nose: Nose normal.      Mouth/Throat:      Mouth: Mucous membranes are moist.      Pharynx: Oropharynx is clear.   Eyes:      General: No scleral icterus.     Conjunctiva/sclera: Conjunctivae normal.   Neck:      Musculoskeletal: Normal range of motion and neck supple.   Cardiovascular:      Rate and Rhythm: Normal rate and regular rhythm.      Pulses: Normal pulses.      Heart sounds: Normal heart sounds. No murmur.   Pulmonary:      Effort: Pulmonary effort is normal. No respiratory distress.      Breath sounds: Rales present. No wheezing.   Abdominal:      General: Bowel sounds are normal.      Palpations: Abdomen is soft.      Tenderness: There is no abdominal tenderness. There is no guarding or rebound.   Musculoskeletal:      Right lower leg: No edema.      Left lower leg: No edema.   Skin:     General: Skin is warm and dry.   Neurological:      General: No focal deficit present.      Mental Status: He is alert and oriented to person, place, and time. Mental status is at baseline.      Cranial Nerves: No cranial nerve deficit.   Psychiatric:         Mood and Affect: Mood normal.         Behavior: Behavior normal.         Fluids    Intake/Output Summary (Last 24 hours) at 1/7/2021 2824  Last data filed at 1/7/2021 0514  Gross per 24 hour   Intake 240 ml   Output 1300  ml   Net -1060 ml       Laboratory  Recent Labs     01/06/21  0802   WBC 10.2   RBC 4.38*   HEMOGLOBIN 12.8*   HEMATOCRIT 40.6*   MCV 92.7   MCH 29.2   MCHC 31.5*   RDW 46.3   PLATELETCT 707*   MPV 8.9*     Recent Labs     01/06/21  0802   SODIUM 135   POTASSIUM 4.2   CHLORIDE 99   CO2 31   GLUCOSE 116*   BUN 10   CREATININE 0.71   CALCIUM 9.0                   Imaging  EC-ECHOCARDIOGRAM LTD W/O CONT   Final Result      DX-CHEST-PORTABLE (1 VIEW)   Final Result      Persistent right greater left, consolidation consistent with Covid 19 pneumonia.      US-EXTREMITY VENOUS LOWER BILAT   Final Result      DX-CHEST-PORTABLE (1 VIEW)   Final Result      No significant change in Covid pneumonia.      US-EXTREMITY VENOUS LOWER BILAT   Final Result      EC-ECHOCARDIOGRAM LTD W/O CONT   Final Result      DX-CHEST-LIMITED (1 VIEW)   Final Result      No significant change in bilateral pneumonia.      DX-CHEST-LIMITED (1 VIEW)   Final Result         Persistent bilateral pulmonary opacities consistent with Covid 19 pneumonia.      DX-CHEST-PORTABLE (1 VIEW)   Final Result      Bilateral peripheral pulmonary airspace process highly suspicious for Covid pneumonia           Assessment/Plan  * Acute hypoxemic respiratory failure (HCC)- (present on admission)  Assessment & Plan  Secondary to COVID pneumonia.  Oxygen PRN; wean as tolerated.  Has completed course of Decadron and remdesivir.  Titrate oxygen as tolerated  Frequent pronation incentive spirometer  Up to chair for all meals    Pneumonia due to COVID-19 virus- (present on admission)  Assessment & Plan  Tested COVID-19 positive on 12/8.  Oxygen requirements starting to improve.   S/p Decadron, remdesivir.  ICS, encourage proning, and mobilization.   Completed 2 courses of antibiotics for pneumonia.   Ultrasound negative for DVT in bilateral lower extremities, echocardiogram reveals normal LVEF.    Anxiety  Assessment & Plan  Ordered Xanax 0.25mg TIDprn   On escitalopram 10mg  daily    Elevated d-dimer- (present on admission)  Assessment & Plan  12/16 LE US negative for DVT  Echo from 12/11 reviewed with no right heart failure.    Abnormal LFTs  Assessment & Plan  Suspect due to covid 19   Monitoring CMP (last checked 12/13)  12/17 LFTs showing improvement.  Likely viral etiology    Thrombocytosis (HCC)  Assessment & Plan  Trending down.  Likely reactive.  No active signs of bleeding or bruising  Continue to monitor all cell lines with CBC.    Aspiration pneumonia (HCC)  Assessment & Plan  Found to have left sided infiltrate  Started on HCAP antibiotics including (Zosyn and doxycyline) and de-escalated to Augmentin/doxy (procalcitonin had been 5 after admit though went down to 0.4 on 12/26.  Completed abx course on 12/30.   Negative blood cultures from 12/23  Respiratory care protocol   Placed on o2 therapy   Titrate O2 as tolerable    Type 2 diabetes mellitus with hyperglycemia, without long-term current use of insulin ([facility])  Assessment & Plan  Lab Results   Component Value Date/Time    HBA1C 7.3 (H) 12/09/2020 0828    HBA1C 5.6 12/24/2019 0810    HBA1C 5.7 (H) 09/23/2019 1036     Results from last 7 days   Lab Units 01/06/21  0513 01/05/21  2138 01/05/21  1751 01/05/21  1222 01/05/21  0540 01/04/21  2034 01/04/21  1730 01/04/21  1140   ACCU CHECK GLUCOSE 788 mg/dL 115* 151* 151* 159* 129* 148* 165* 130*     I have ordered insulin sliding scale with D50 and glucagon for hypoglycemia per protocol.  Diabetic diet  Diabetic education    Neuropathic pain- (present on admission)  Assessment & Plan  gabapentin        VTE prophylaxis: enoxaparin.

## 2021-01-07 NOTE — PROGRESS NOTES
Transfer report given to GIOVANA Bartlett. Pt informed of transfer to [facility] and all personal belongings at bedside.

## 2021-01-07 NOTE — PROGRESS NOTES
Received report from NOC shift assumed care of patient. Very sob when returning from bathroom and fatigue dr at bedside takes a few min to recover pO2 turned up to 10 l

## 2021-01-07 NOTE — PROGRESS NOTES
Bedside report received and patient care assumed. Pt is resting in bed, AOx4 and is on OM @ 5L with SpO2 96%. Breathing is shallow and unlabored. Pt is medical. Bed in lowest position, bed alarm on, and call light within reach. All fall precautions are in place, belongings at bedside table. Hourly rounding initiated.

## 2021-01-07 NOTE — PROGRESS NOTES
Nurse extender ambulated with PT the bathroom. PT needed 10 liters of 02, Pt desated to low 80's while ambulating. PT was not placed on RA due to safety reasons.

## 2021-01-08 LAB
ALBUMIN SERPL BCP-MCNC: 3.5 G/DL (ref 3.2–4.9)
ALBUMIN/GLOB SERPL: 0.9 G/DL
ALP SERPL-CCNC: 207 U/L (ref 30–99)
ALT SERPL-CCNC: 70 U/L (ref 2–50)
ANION GAP SERPL CALC-SCNC: 9 MMOL/L (ref 7–16)
AST SERPL-CCNC: 51 U/L (ref 12–45)
BASOPHILS # BLD AUTO: 0.8 % (ref 0–1.8)
BASOPHILS # BLD: 0.09 K/UL (ref 0–0.12)
BILIRUB SERPL-MCNC: 0.2 MG/DL (ref 0.1–1.5)
BUN SERPL-MCNC: 12 MG/DL (ref 8–22)
CALCIUM SERPL-MCNC: 9.2 MG/DL (ref 8.5–10.5)
CHLORIDE SERPL-SCNC: 98 MMOL/L (ref 96–112)
CO2 SERPL-SCNC: 30 MMOL/L (ref 20–33)
CREAT SERPL-MCNC: 0.76 MG/DL (ref 0.5–1.4)
EOSINOPHIL # BLD AUTO: 0.31 K/UL (ref 0–0.51)
EOSINOPHIL NFR BLD: 2.6 % (ref 0–6.9)
ERYTHROCYTE [DISTWIDTH] IN BLOOD BY AUTOMATED COUNT: 45.6 FL (ref 35.9–50)
GLOBULIN SER CALC-MCNC: 3.8 G/DL (ref 1.9–3.5)
GLUCOSE BLD-MCNC: 119 MG/DL (ref 65–99)
GLUCOSE BLD-MCNC: 127 MG/DL (ref 65–99)
GLUCOSE BLD-MCNC: 132 MG/DL (ref 65–99)
GLUCOSE BLD-MCNC: 93 MG/DL (ref 65–99)
GLUCOSE SERPL-MCNC: 148 MG/DL (ref 65–99)
HCT VFR BLD AUTO: 40 % (ref 42–52)
HGB BLD-MCNC: 12.5 G/DL (ref 14–18)
IMM GRANULOCYTES # BLD AUTO: 0.16 K/UL (ref 0–0.11)
IMM GRANULOCYTES NFR BLD AUTO: 1.3 % (ref 0–0.9)
LYMPHOCYTES # BLD AUTO: 4.76 K/UL (ref 1–4.8)
LYMPHOCYTES NFR BLD: 39.8 % (ref 22–41)
MCH RBC QN AUTO: 29.1 PG (ref 27–33)
MCHC RBC AUTO-ENTMCNC: 31.3 G/DL (ref 33.7–35.3)
MCV RBC AUTO: 93 FL (ref 81.4–97.8)
MONOCYTES # BLD AUTO: 0.91 K/UL (ref 0–0.85)
MONOCYTES NFR BLD AUTO: 7.6 % (ref 0–13.4)
NEUTROPHILS # BLD AUTO: 5.72 K/UL (ref 1.82–7.42)
NEUTROPHILS NFR BLD: 47.9 % (ref 44–72)
NRBC # BLD AUTO: 0 K/UL
NRBC BLD-RTO: 0 /100 WBC
PLATELET # BLD AUTO: 583 K/UL (ref 164–446)
PMV BLD AUTO: 8.9 FL (ref 9–12.9)
POTASSIUM SERPL-SCNC: 4 MMOL/L (ref 3.6–5.5)
PROT SERPL-MCNC: 7.3 G/DL (ref 6–8.2)
RBC # BLD AUTO: 4.3 M/UL (ref 4.7–6.1)
SODIUM SERPL-SCNC: 137 MMOL/L (ref 135–145)
WBC # BLD AUTO: 12 K/UL (ref 4.8–10.8)

## 2021-01-08 PROCEDURE — 80053 COMPREHEN METABOLIC PANEL: CPT

## 2021-01-08 PROCEDURE — A9270 NON-COVERED ITEM OR SERVICE: HCPCS | Performed by: INTERNAL MEDICINE

## 2021-01-08 PROCEDURE — 94760 N-INVAS EAR/PLS OXIMETRY 1: CPT

## 2021-01-08 PROCEDURE — 85025 COMPLETE CBC W/AUTO DIFF WBC: CPT

## 2021-01-08 PROCEDURE — 700102 HCHG RX REV CODE 250 W/ 637 OVERRIDE(OP): Performed by: HOSPITALIST

## 2021-01-08 PROCEDURE — 770014 HCHG ROOM/CARE - WARD (150)

## 2021-01-08 PROCEDURE — 700102 HCHG RX REV CODE 250 W/ 637 OVERRIDE(OP): Performed by: INTERNAL MEDICINE

## 2021-01-08 PROCEDURE — 700111 HCHG RX REV CODE 636 W/ 250 OVERRIDE (IP): Performed by: EMERGENCY MEDICINE

## 2021-01-08 PROCEDURE — 36415 COLL VENOUS BLD VENIPUNCTURE: CPT

## 2021-01-08 PROCEDURE — 82962 GLUCOSE BLOOD TEST: CPT | Mod: 91

## 2021-01-08 PROCEDURE — 99233 SBSQ HOSP IP/OBS HIGH 50: CPT | Performed by: STUDENT IN AN ORGANIZED HEALTH CARE EDUCATION/TRAINING PROGRAM

## 2021-01-08 PROCEDURE — A9270 NON-COVERED ITEM OR SERVICE: HCPCS | Performed by: HOSPITALIST

## 2021-01-08 PROCEDURE — 700101 HCHG RX REV CODE 250: Performed by: HOSPITALIST

## 2021-01-08 RX ORDER — GLUCOSAMINE HCL/CHONDROITIN SU 500-400 MG
CAPSULE ORAL
Qty: 100 EACH | Refills: 0 | Status: SHIPPED | OUTPATIENT
Start: 2021-01-08

## 2021-01-08 RX ORDER — INSULIN GLARGINE 100 [IU]/ML
10 INJECTION, SOLUTION SUBCUTANEOUS EVERY EVENING
Qty: 10 ML | Refills: 0 | Status: SHIPPED | OUTPATIENT
Start: 2021-01-08 | End: 2021-01-09

## 2021-01-08 RX ORDER — LANCETS 30 GAUGE
EACH MISCELLANEOUS
Qty: 100 EACH | Refills: 0 | Status: SHIPPED | OUTPATIENT
Start: 2021-01-08

## 2021-01-08 RX ORDER — ESCITALOPRAM OXALATE 10 MG/1
10 TABLET ORAL DAILY
Qty: 30 TAB | Refills: 0 | Status: SHIPPED | OUTPATIENT
Start: 2021-01-08

## 2021-01-08 RX ADMIN — ESCITALOPRAM OXALATE 10 MG: 10 TABLET ORAL at 05:09

## 2021-01-08 RX ADMIN — GABAPENTIN 400 MG: 400 CAPSULE ORAL at 17:54

## 2021-01-08 RX ADMIN — LIDOCAINE 1 PATCH: 50 PATCH TOPICAL at 13:13

## 2021-01-08 RX ADMIN — ENOXAPARIN SODIUM 40 MG: 40 INJECTION SUBCUTANEOUS at 05:09

## 2021-01-08 RX ADMIN — INSULIN GLARGINE 11 UNITS: 100 INJECTION, SOLUTION SUBCUTANEOUS at 17:54

## 2021-01-08 RX ADMIN — INSULIN HUMAN 3 UNITS: 100 INJECTION, SOLUTION PARENTERAL at 06:30

## 2021-01-08 RX ADMIN — OMEPRAZOLE 20 MG: 20 CAPSULE, DELAYED RELEASE ORAL at 05:09

## 2021-01-08 RX ADMIN — GABAPENTIN 400 MG: 400 CAPSULE ORAL at 05:09

## 2021-01-08 ASSESSMENT — PAIN DESCRIPTION - PAIN TYPE: TYPE: ACUTE PAIN

## 2021-01-08 NOTE — PROGRESS NOTES
Pt resting in bed, no s/s of distress, rr even and unlabored, pt denies pain, will continue to monitor.

## 2021-01-08 NOTE — DISCHARGE PLANNING
Received Choice form at 1157  Agency/Facility Name: [facility]  Referral sent per Choice form @ 1204     @1224  Agency/Facility Name: [facility]  Spoke To: Afua  Outcome: Informed Afua that sister will be paying and gave Afua her cell phone number: [phone]

## 2021-01-08 NOTE — CARE PLAN
Problem: Nutritional:  Goal: Achieve adequate nutritional intake  Description: Patient will consume 50% of meals + supplements  Outcome: MET   Per flowsheets pt PO intake % of documented meals and % of supplements.   Pt with adequate nutrition at this time.     RD to monitor per department guidelines.

## 2021-01-08 NOTE — DISCHARGE PLANNING
Spoke with patient at bedside with Interpretor. Patient has no insurance and can not afford $ 120.00. Patients sister Nadia will pay for home O2 and has credit card. Choice form filled out and faxed to Claudia with sisters information on it to be contacted.

## 2021-01-08 NOTE — PROGRESS NOTES
Received report from [facility] shift assumed care of patient with assist  #[id number] Bassem discussed plan of care patient states fatigue after walking to restroom

## 2021-01-08 NOTE — DISCHARGE PLANNING
Agency/Facility Name: [facility]  Spoke To: Carla  Outcome: O2 will be delivered within the hour.

## 2021-01-08 NOTE — DISCHARGE SUMMARY
Discharge Summary    CHIEF COMPLAINT ON ADMISSION  Chief Complaint   Patient presents with   • Shortness of Breath     pt has diarrhea, vomiting, sob, cough the last two weeks.        Reason for Admission  SOB     Admission Date  12/8/2020    CODE STATUS  Full Code    HPI & HOSPITAL COURSE  55 y.o. male with history of chronic neuropathy who presented on 12/8/2020 with shortness of breath, productive cough, congestion x 9 days.  Also initially reported some diarrhea, nausea, vomiting, generalized body aches.  On presentation he did require high flow nasal cannula.  Chest x-ray was concerning for Covid.  He was tested as outpatient for Covid but not follow-up on results.    He was admitted to the ICU for acute hypoxic respiratory failure secondary to Covid pneumonia.  Patient initially requiring high flow nasal cannula. He completed a course of Decadron and Remdesivir.  An echocardiogram showed ejection fraction 70%, normal regional wall motion, normal left ventricular chamber size.  The patient did have a fever on 12/23 and was started on Unasyn and doxycycline.  He was also on Zosyn for 5 days as coverage for left-sided pneumonia.  Lower extremity ultrasound was negative for deep venous thrombosis.  Ultimately, his respiratory function improved gradually and he was titrated down to 4 L nasal cannula.  As per home oxygen evaluation patient is to be discharged on home oxygen which was provided.  Stable patient with chronic condition is to be discharged home and to follow-up with primary care provider in 2 weeks.  Patient was instructed to quarantine at home for 2 weeks.  All results and plan of action were discussed with the patient for which she voiced understanding and agreed with the primary care team.  Patient was instructed to return to emergency department if symptoms were to worsen.    Therefore, he is discharged in good and stable condition to home with close outpatient follow-up.    The patient met  2-midnight criteria for an inpatient stay at the time of discharge.    Discharge Date  1/8/2021    FOLLOW UP ITEMS POST DISCHARGE  Primary care provider to follow oxygen requirements in 2 weeks    DISCHARGE DIAGNOSES  Principal Problem:    Acute hypoxemic respiratory failure (HCC) POA: Yes  Active Problems:    Pneumonia due to COVID-19 virus POA: Yes    Abnormal LFTs POA: Unknown    Elevated d-dimer POA: Yes    Melena POA: Yes    Anxiety POA: Unknown    Type 2 diabetes mellitus with hyperglycemia, without long-term current use of insulin (HCC) POA: Unknown    Aspiration pneumonia (HCC) POA: Unknown    Thrombocytosis (HCC) POA: Unknown    Neuropathic pain POA: Yes  Resolved Problems:    Lactic acidosis POA: Yes    Sepsis (HCC) POA: Unknown    Hyponatremia POA: Yes    Agitation POA: Yes    Acute pericarditis POA: Unknown      FOLLOW UP  No future appointments.  No follow-up provider specified.    MEDICATIONS ON DISCHARGE     Medication List      ASK your doctor about these medications      Instructions   escitalopram 5 MG tablet  Commonly known as: Lexapro   Take 10 mg by mouth every day.  Dose: 10 mg     gabapentin 100 MG Caps  Commonly known as: NEURONTIN  Ask about: Which instructions should I use?   Take 400 mg by mouth 2 Times a Day.  Dose: 400 mg     OLANZapine 2.5 MG Tabs  Commonly known as: ZYPREXA   Take 5 mg by mouth every day.  Dose: 5 mg            Allergies  No Known Allergies    DIET  Orders Placed This Encounter   Procedures   • Diet Order Diet: Consistent CHO (Diabetic)     Standing Status:   Standing     Number of Occurrences:   1     Order Specific Question:   Diet:     Answer:   Consistent CHO (Diabetic) [4]       ACTIVITY  As tolerated.  Weight bearing as tolerated    CONSULTATIONS  Pulmonary    PROCEDURES  None    LABORATORY  Lab Results   Component Value Date    SODIUM 137 01/08/2021    POTASSIUM 4.0 01/08/2021    CHLORIDE 98 01/08/2021    CO2 30 01/08/2021    GLUCOSE 148 (H) 01/08/2021    BUN 12  01/08/2021    CREATININE 0.76 01/08/2021        Lab Results   Component Value Date    WBC 12.0 (H) 01/08/2021    HEMOGLOBIN 12.5 (L) 01/08/2021    HEMATOCRIT 40.0 (L) 01/08/2021    PLATELETCT 583 (H) 01/08/2021        Total time of the discharge process exceeds 45 minutes.

## 2021-01-08 NOTE — FACE TO FACE
"Face to Face Note  -  Durable Medical Equipment    Clifford Ngo M.D. - NPI: [id number]  I certify that this patient is under my care and that they had a durable medical equipment(DME)face to face encounter by myself that meets the physician DME face-to-face encounter requirements with this patient on:    Date of encounter:   Patient:                    MRN:                       YOB: 2019  Singh Hall  [id number]  1965     The encounter with the patient was in whole, or in part, for the following medical condition, which is the primary reason for durable medical equipment:  Other - COVID    I certify that, based on my findings, the following durable medical equipment is medically necessary:  Oxygen.    HOME O2 Saturation Measurements:(Values must be present for Home Oxygen orders)  Room air sat at rest: 91  Room air sat with amb: 77  With liters of O2: 4, O2 sat at rest with O2: 95  With Liters of O2: 4, O2 sat with amb with O2 : 92  Is the patient mobile?: Yes    My Clinical findings support the need for the above equipment due to:  Hypoxia    Supporting Symptoms: The patient requires supplemental oxygen, as the following interventions have been tried with limited or no improvement: \"Oral and/or IV steroids    If patient feels more short of breath, they can go up to 6 liters per minute and contact healthcare provider.  "

## 2021-01-09 VITALS
DIASTOLIC BLOOD PRESSURE: 76 MMHG | HEIGHT: 65 IN | OXYGEN SATURATION: 94 % | BODY MASS INDEX: 19.17 KG/M2 | HEART RATE: 92 BPM | TEMPERATURE: 97 F | SYSTOLIC BLOOD PRESSURE: 124 MMHG | WEIGHT: 115.08 LBS | RESPIRATION RATE: 20 BRPM

## 2021-01-09 LAB — GLUCOSE BLD-MCNC: 116 MG/DL (ref 65–99)

## 2021-01-09 PROCEDURE — 94760 N-INVAS EAR/PLS OXIMETRY 1: CPT

## 2021-01-09 PROCEDURE — 700102 HCHG RX REV CODE 250 W/ 637 OVERRIDE(OP): Performed by: INTERNAL MEDICINE

## 2021-01-09 PROCEDURE — 700111 HCHG RX REV CODE 636 W/ 250 OVERRIDE (IP): Performed by: EMERGENCY MEDICINE

## 2021-01-09 PROCEDURE — 700102 HCHG RX REV CODE 250 W/ 637 OVERRIDE(OP): Performed by: HOSPITALIST

## 2021-01-09 PROCEDURE — A9270 NON-COVERED ITEM OR SERVICE: HCPCS | Performed by: HOSPITALIST

## 2021-01-09 PROCEDURE — A9270 NON-COVERED ITEM OR SERVICE: HCPCS | Performed by: INTERNAL MEDICINE

## 2021-01-09 PROCEDURE — 82962 GLUCOSE BLOOD TEST: CPT

## 2021-01-09 PROCEDURE — 99239 HOSP IP/OBS DSCHRG MGMT >30: CPT | Performed by: STUDENT IN AN ORGANIZED HEALTH CARE EDUCATION/TRAINING PROGRAM

## 2021-01-09 RX ADMIN — ESCITALOPRAM OXALATE 10 MG: 10 TABLET ORAL at 05:22

## 2021-01-09 RX ADMIN — OMEPRAZOLE 20 MG: 20 CAPSULE, DELAYED RELEASE ORAL at 05:22

## 2021-01-09 RX ADMIN — ENOXAPARIN SODIUM 40 MG: 40 INJECTION SUBCUTANEOUS at 05:22

## 2021-01-09 RX ADMIN — GABAPENTIN 400 MG: 400 CAPSULE ORAL at 05:22

## 2021-01-09 ASSESSMENT — ENCOUNTER SYMPTOMS
SPUTUM PRODUCTION: 1
MYALGIAS: 0
BLURRED VISION: 0
NAUSEA: 0
DEPRESSION: 0
DIARRHEA: 0
ABDOMINAL PAIN: 0
PALPITATIONS: 0
COUGH: 1
CHILLS: 0
SHORTNESS OF BREATH: 1
FEVER: 0
ROS GI COMMENTS: IMPROVED APPETITE
SORE THROAT: 0
VOMITING: 0
DIZZINESS: 0
HEADACHES: 0

## 2021-01-09 NOTE — PROGRESS NOTES
Report received from GIOVANA Judd.  Patient resting in bed.  Cayden Brower translated for assessment.  POC gone over with pt and all questions answered.  Patient A & O  X 4.  No apparent signs of distress.  Safety precautions in place.  Patient educated to call for assistance.  Will continue to monitor.

## 2021-01-09 NOTE — PROGRESS NOTES
Bedside report received at 1900. Assessment done. VSS. AOx4. Unlabored and even breathing,3LNC. No pain at this time. Skin intact. IV saline locked. 100% dinner finished. Hourly patient rounding done. Droplet/contact precaution in place. Reviewed POC. All questions answered at this time.

## 2021-01-09 NOTE — PROGRESS NOTES
[facility] Medicine Daily Progress Note    Date of Service  1/8/2021    Chief Complaint  55 y.o. male admitted 12/8/2020 with shortness of breath.    Hospital Course  Mr. Singh Hall is a 55 y.o. male with history of chronic neuropathy who presented on 12/8/2020 with shortness of breath, productive cough, congestion x 9 days.  Also initially reported some diarrhea, nausea, vomiting, generalized body aches.  On presentation he did require high flow nasal cannula.  Chest x-ray was concerning for Covid.  He was tested as outpatient for Covid but not follow-up on results.  He completed a course of Decadron and Remdesivir.  An echocardiogram showed ejection fraction 70%, normal regional wall motion, normal left ventricular chamber size.  The patient did have a fever on 12/23 and was started on Unasyn and doxycycline.  He was also on Zosyn for 5 days as coverage for left-sided pneumonia.  Lower extremity ultrasound was negative for deep venous thrombosis.    Interval Problem Update  Patient evaluated bedside and in no acute distress  Requiring 4L nasal cannula during ambulation  Titrate oxygen as tolerated  Frequent pronation incentive spirometer  Up to chair for all meals  Home O2 set up today    Consultants/Specialty  Critical care    Code Status  Full Code    Disposition  TBD  Review of Systems  Review of Systems   Constitutional: Negative for chills and fever.   HENT: Negative for congestion and sore throat.    Eyes: Negative for blurred vision.   Respiratory: Positive for cough (improving), sputum production (improving) and shortness of breath (improving).    Cardiovascular: Negative for chest pain, palpitations and leg swelling.   Gastrointestinal: Negative for abdominal pain, diarrhea, nausea and vomiting.        Improved appetite   Genitourinary: Negative for dysuria, frequency and urgency.   Musculoskeletal: Negative for myalgias.   Skin: Negative for rash.   Neurological: Negative for dizziness and headaches.    Psychiatric/Behavioral: Negative for depression.   All other systems reviewed and are negative.       Physical Exam  Temp:  [36.4 °C (97.5 °F)-37.2 °C (99 °F)] 36.4 °C (97.5 °F)  Pulse:  [] 75  Resp:  [17-22] 17  BP: ()/(69-70) 108/70  SpO2:  [87 %-100 %] 95 %    Physical Exam  Vitals signs and nursing note reviewed.   Constitutional:       General: He is not in acute distress.     Appearance: He is not toxic-appearing.   HENT:      Head: Normocephalic and atraumatic.      Right Ear: External ear normal.      Left Ear: External ear normal.      Nose: Nose normal.      Mouth/Throat:      Mouth: Mucous membranes are moist.      Pharynx: Oropharynx is clear.   Eyes:      General: No scleral icterus.     Conjunctiva/sclera: Conjunctivae normal.   Neck:      Musculoskeletal: Normal range of motion and neck supple.   Cardiovascular:      Rate and Rhythm: Normal rate and regular rhythm.      Pulses: Normal pulses.      Heart sounds: Normal heart sounds. No murmur.   Pulmonary:      Effort: Pulmonary effort is normal. No respiratory distress.      Breath sounds: Rales present. No wheezing.   Abdominal:      General: Bowel sounds are normal.      Palpations: Abdomen is soft.      Tenderness: There is no abdominal tenderness. There is no guarding or rebound.   Musculoskeletal:      Right lower leg: No edema.      Left lower leg: No edema.   Skin:     General: Skin is warm and dry.   Neurological:      General: No focal deficit present.      Mental Status: He is alert and oriented to person, place, and time. Mental status is at baseline.      Cranial Nerves: No cranial nerve deficit.   Psychiatric:         Mood and Affect: Mood normal.         Behavior: Behavior normal.         Fluids  No intake or output data in the 24 hours ending 01/09/21 0731    Laboratory  Recent Labs     01/06/21  0802 01/07/21  1120 01/08/21  0915   WBC 10.2 11.2* 12.0*   RBC 4.38* 4.31* 4.30*   HEMOGLOBIN 12.8* 12.6* 12.5*   HEMATOCRIT  40.6* 40.0* 40.0*   MCV 92.7 92.8 93.0   MCH 29.2 29.2 29.1   MCHC 31.5* 31.5* 31.3*   RDW 46.3 45.7 45.6   PLATELETCT 707* 597* 583*   MPV 8.9* 8.9* 8.9*     Recent Labs     01/06/21  0802 01/07/21  1120 01/08/21  0915   SODIUM 135 138 137   POTASSIUM 4.2 4.1 4.0   CHLORIDE 99 100 98   CO2 31 28 30   GLUCOSE 116* 127* 148*   BUN 10 13 12   CREATININE 0.71 0.69 0.76   CALCIUM 9.0 9.1 9.2                   Imaging  EC-ECHOCARDIOGRAM LTD W/O CONT   Final Result      DX-CHEST-PORTABLE (1 VIEW)   Final Result      Persistent right greater left, consolidation consistent with Covid 19 pneumonia.      US-EXTREMITY VENOUS LOWER BILAT   Final Result      DX-CHEST-PORTABLE (1 VIEW)   Final Result      No significant change in Covid pneumonia.      US-EXTREMITY VENOUS LOWER BILAT   Final Result      EC-ECHOCARDIOGRAM LTD W/O CONT   Final Result      DX-CHEST-LIMITED (1 VIEW)   Final Result      No significant change in bilateral pneumonia.      DX-CHEST-LIMITED (1 VIEW)   Final Result         Persistent bilateral pulmonary opacities consistent with Covid 19 pneumonia.      DX-CHEST-PORTABLE (1 VIEW)   Final Result      Bilateral peripheral pulmonary airspace process highly suspicious for Covid pneumonia           Assessment/Plan  * Acute hypoxemic respiratory failure (HCC)- (present on admission)  Assessment & Plan  Secondary to COVID pneumonia.  Oxygen PRN; wean as tolerated.  Has completed course of Decadron and remdesivir.  Titrate oxygen as tolerated  Frequent pronation incentive spirometer  Up to chair for all meals    Pneumonia due to COVID-19 virus- (present on admission)  Assessment & Plan  Tested COVID-19 positive on 12/8.  Oxygen requirements starting to improve.   S/p Decadron, remdesivir.  ICS, encourage proning, and mobilization.   Completed 2 courses of antibiotics for pneumonia.   Ultrasound negative for DVT in bilateral lower extremities, echocardiogram reveals normal LVEF.    Anxiety  Assessment &  Plan  Ordered Xanax 0.25mg TIDprn   On escitalopram 10mg daily    Elevated d-dimer- (present on admission)  Assessment & Plan  12/16 LE US negative for DVT  Echo from 12/11 reviewed with no right heart failure.    Abnormal LFTs  Assessment & Plan  Suspect due to covid 19   Monitoring CMP (last checked 12/13)  12/17 LFTs showing improvement.  Likely viral etiology    Thrombocytosis (HCC)  Assessment & Plan  Trending down.  Likely reactive.  No active signs of bleeding or bruising  Continue to monitor all cell lines with CBC.    Aspiration pneumonia (HCC)  Assessment & Plan  Found to have left sided infiltrate  Started on HCAP antibiotics including (Zosyn and doxycyline) and de-escalated to Augmentin/doxy (procalcitonin had been 5 after admit though went down to 0.4 on 12/26.  Completed abx course on 12/30.   Negative blood cultures from 12/23  Respiratory care protocol   Placed on o2 therapy   Titrate O2 as tolerable    Type 2 diabetes mellitus with hyperglycemia, without long-term current use of insulin ([facility])  Assessment & Plan  Lab Results   Component Value Date/Time    HBA1C 7.3 (H) 12/09/2020 0828    HBA1C 5.6 12/24/2019 0810    HBA1C 5.7 (H) 09/23/2019 1036     Results from last 7 days   Lab Units 01/06/21  0513 01/05/21  2138 01/05/21  1751 01/05/21  1222 01/05/21  0540 01/04/21  2034 01/04/21  1730 01/04/21  1140   ACCU CHECK GLUCOSE 788 mg/dL 115* 151* 151* 159* 129* 148* 165* 130*     I have ordered insulin sliding scale with D50 and glucagon for hypoglycemia per protocol.  Diabetic diet  Diabetic education    Neuropathic pain- (present on admission)  Assessment & Plan  gabapentin        VTE prophylaxis: enoxaparin.

## 2021-01-09 NOTE — DISCHARGE SUMMARY
Discharge Summary    CHIEF COMPLAINT ON ADMISSION  Chief Complaint   Patient presents with   • Shortness of Breath     pt has diarrhea, vomiting, sob, cough the last two weeks.        Reason for Admission  SOB     Admission Date  12/8/2020    CODE STATUS  Full Code    HPI & HOSPITAL COURSE  55 y.o. male with history of chronic neuropathy who presented on 12/8/2020 with shortness of breath, productive cough, congestion x 9 days.  Also initially reported some diarrhea, nausea, vomiting, generalized body aches.  On presentation he did require high flow nasal cannula.  Chest x-ray was concerning for Covid.  He was tested as outpatient for Covid but not follow-up on results.    He was admitted to the ICU for acute hypoxic respiratory failure secondary to Covid pneumonia.  Patient initially requiring high flow nasal cannula. He completed a course of Decadron and Remdesivir.  An echocardiogram showed ejection fraction 70%, normal regional wall motion, normal left ventricular chamber size.  The patient did have a fever on 12/23 and was started on Unasyn and doxycycline.  He was also on Zosyn for 5 days as coverage for left-sided pneumonia.  Lower extremity ultrasound was negative for deep venous thrombosis.  Ultimately, his respiratory function improved gradually and he was titrated down to 4 L nasal cannula. As per home oxygen evaluation patient is to be discharged on home oxygen which was provided. Additionally, patient needing Lantus nightly to maintain blood sugars and A1c at 7.3.  Patient was discharged on Metformin regimen and was instructed to follow-up with primary care provider. Stable patient within chronic condition is to be discharged home and to follow-up with primary care provider in 2 weeks.  Patient was instructed to quarantine at home for 2 weeks.  All results and plan of action were discussed with the patient for which she voiced understanding and agreed with the primary care team.  Patient was instructed  to return to emergency department if symptoms were to worsen.     Therefore, he is discharged in good and stable condition to home with close outpatient follow-up.    The patient met 2-midnight criteria for an inpatient stay at the time of discharge.    Discharge Date  1/9/2021    FOLLOW UP ITEMS POST DISCHARGE  Primary care provider to follow oxygen requirements in 2 weeks  Primary care provider follow glycemic control and diabetic regimen as an outpatient    DISCHARGE DIAGNOSES  Principal Problem:    Acute hypoxemic respiratory failure (HCC) POA: Yes  Active Problems:    Pneumonia due to COVID-19 virus POA: Yes    Abnormal LFTs POA: Unknown    Elevated d-dimer POA: Yes    Melena POA: Yes    Anxiety POA: Unknown    Type 2 diabetes mellitus with hyperglycemia, without long-term current use of insulin (HCC) POA: Unknown    Aspiration pneumonia (HCC) POA: Unknown    Thrombocytosis (HCC) POA: Unknown    Neuropathic pain POA: Yes  Resolved Problems:    Lactic acidosis POA: Yes    Sepsis (HCC) POA: Unknown    Hyponatremia POA: Yes    Agitation POA: Yes    Acute pericarditis POA: Unknown      FOLLOW UP  No future appointments.  No follow-up provider specified.    MEDICATIONS ON DISCHARGE     Medication List      START taking these medications      Instructions   Alcohol Swabs   Doctor's comments: Per formulary preference. ICD-10 code: E11.9 Controlled type 2 Diabetes Mellitus  Wipe site with prep pad prior to injection.     * Blood Glucose Meter Kit   Doctor's comments: Or per formulary preference. ICD-10 code: E11.9 Controlled type 2 Diabetes Mellitus  Test blood sugar as recommended by provider. [employer] Freedom Lite blood glucose monitoring kit.     * Blood Glucose Test Strips   Doctor's comments: Or per formulary preference. ICD-10 code: E11.9 Controlled type 2 Diabetes Mellitus  Use one Abbott Freedom Lite strip to test blood sugar once daily early morning before first meal.     insulin glargine 100 UNIT/ML  Soln  Commonly known as: Lantus   Inject 10 Units under the skin every evening.  Dose: 10 Units     Insulin Syringes U-100 0.3 mL   Doctor's comments: Per formulary preference. ICD-10 code: E11.9 Controlled type 2 Diabetes Mellitus  Use one syringe to inject insulin once daily.     Lancets   Doctor's comments: Or per formulary preference. ICD-10 code: E11.9 Controlled type 2 Diabetes Mellitus  Use one Abbott [place] Lite lancet to test blood sugar once daily early morning before first meal.         * This list has 2 medication(s) that are the same as other medications prescribed for you. Read the directions carefully, and ask your doctor or other care provider to review them with you.            CHANGE how you take these medications      Instructions   escitalopram 10 MG Tabs  What changed: medication strength  Commonly known as: Lexapro   Take 1 Tab by mouth every day.  Dose: 10 mg        CONTINUE taking these medications      Instructions   gabapentin 100 MG Caps  Commonly known as: NEURONTIN   Take 400 mg by mouth 2 Times a Day.  Dose: 400 mg        STOP taking these medications    OLANZapine 2.5 MG Tabs  Commonly known as: ZYPREXA            Allergies  No Known Allergies    DIET  Orders Placed This Encounter   Procedures   • Diet Order Diet: Consistent CHO (Diabetic)     Standing Status:   Standing     Number of Occurrences:   1     Order Specific Question:   Diet:     Answer:   Consistent CHO (Diabetic) [4]       ACTIVITY  As tolerated.  Weight bearing as tolerated    CONSULTATIONS  Pulmonary    PROCEDURES  None    LABORATORY  Lab Results   Component Value Date    SODIUM 137 01/08/2021    POTASSIUM 4.0 01/08/2021    CHLORIDE 98 01/08/2021    CO2 30 01/08/2021    GLUCOSE 148 (H) 01/08/2021    BUN 12 01/08/2021    CREATININE 0.76 01/08/2021        Lab Results   Component Value Date    WBC 12.0 (H) 01/08/2021    HEMOGLOBIN 12.5 (L) 01/08/2021    HEMATOCRIT 40.0 (L) 01/08/2021    PLATELETCT 583 (H) 01/08/2021         Total time of the discharge process exceeds 45 minutes.

## 2021-01-09 NOTE — DISCHARGE INSTRUCTIONS
COVID-19  COVID-19  La COVID-19 es maryuri infección respiratoria causada por un virus llamado coronavirus tipo 2 causante del síndrome respiratorio sulma grave (SARS-CoV-2). La enfermedad también se conoce joshua enfermedad por coronavirus o nuevo coronavirus. En algunas personas, el virus puede no ocasionar síntomas. En otras, puede producir maryuri infección grave. La infección puede empeorar rápidamente y causar complicaciones, joshua:  · Neumonía o infección en los pulmones.  · Síndrome de dificultad respiratoria aguda o SDRA. Se trata de la acumulación de líquido en los pulmones.  · Insuficiencia respiratoria aguda. Se trata de maryuri afección en la que no pasa suficiente oxígeno de los pulmones al cuerpo.  · Sepsis o choque séptico. Se trata de maryuri reacción grave del cuerpo ante maryuri infección.  · Problemas de coagulación.  · Infecciones secundarias debido a bacterias u hongos.  El virus que causa la COVID-19 es contagioso. Larose significa que puede transmitirse de maryuri persona a otra a través de las gotitas de saliva de la tos y de los estornudos (secreciones respiratorias).  ¿Cuáles son las causas?  Esta enfermedad es causada por un virus. Usted puede contagiarse con tracy virus:  · Al aspirar las gotitas que maryuri persona infectada elimina al toser o estornudar.  · Al tocar algo, joshua maryuri green o el picaportes de maryuri marika, que estuvo expuesto al virus (contaminado) y luego tocarse la boca, nariz o los ojos.  ¿Qué incrementa el riesgo?  Riesgo de infección  Es más probable que se infecte con tracy virus si:  · Vive o viaja a maryuri jamie donde hay un brote de COVID-19.  · Entra en contacto con maryuri persona enferma que recientemente viajó a maryuri jamie con un brote de COVID-19.  · Cuida o vive con maryuri persona infectada con COVID-19.  Riesgo de enfermedad grave  Es más probable que se enferme gravemente por el virus si:  · Tiene 65 años o más.  · Tiene maryuri enfermedad crónica que disminuye la capacidad del cuerpo para combatir las  infecciones (immunocomprometido).  · Vive en un hogar de ancianos o centro de atención a hitesh plazo.  · Tiene maryuri enfermedad prolongada (crónica), joshua las siguientes:  ? Enfermedad pulmonar crónica, que incluye la enfermedad pulmonar obstructiva crónica o asma.  ? Enfermedad cardíaca.  ? Diabetes.  ? Enfermedad renal crónica.  ? Enfermedad hepática.  · Es daisy.  ¿Cuáles son los signos o síntomas?  Los síntomas de esta afección pueden ser de leves a graves. Los síntomas pueden aparecer en el término de 2 a 14 días después de cuco estado expuesto al virus. Incluyen los siguientes:  · Fiebre.  · Tos.  · Dificultad para respirar.  · Escalofríos.  · Mira musculares.  · Dolor de garganta.  · Pérdida del gusto o el olfato.  Algunas personas también pueden tener problemas estomacales, joshua náuseas, vómitos o diarrea.  Es posible que otras personas no tengan síntomas de COVID-19.  ¿Cómo se diagnostica?  Esta afección se puede diagnosticar en función de lo siguiente:  · Kayla signos y síntomas, especialmente si:  ? Vive en maryuri jamie donde hay un brote de COVID-19.  ? Viajó recientemente a maryuri jamie donde el virus es frecuente.  ? Cuida o vive con maryuri persona a quien se le diagnosticó COVID-19.  · Un examen físico.  · Análisis de laboratorio que pueden incluir:  ? Un hisopado nasal para son maryuri muestra de líquido de la nariz.  ? Un hisopado de garganta para son maryuri muestra de líquido de la garganta.  ? Maryuri muestra de mucosidad de los pulmones (esputo).  ? Análisis de elizabeth.  · Los estudios de diagnóstico por imágenes pueden incluir radiografías, exploración por tomografía computarizada (TC) o ecografía.  ¿Cómo se trata?  En tracy momento, no hay ningún medicamento para tratar la COVID-19. Los medicamentos para tratar otras enfermedades se usan a modo de ensayo para comprobar si son eficaces contra la COVID-19.  El médico le informará sobre las maneras de tratar los síntomas. En la mayoría de las personas, la infección  es leve y puede controlarse en el hogar con reposo, líquidos y medicamentos de venta denzel.  El tratamiento para maryuri infección grave suele realizarse en la unidad de cuidados intensivos (UCI) de un hospital. Puede incluir maria d o más de los siguientes. Estos tratamientos se administran hasta que los síntomas mejoran.  · Recibir líquidos y medicamentos a través de maryuri vía intravenosa.  · Oxígeno complementario. Para administrar oxígeno extra, se utiliza un tubo en la nariz, maryuri mascarilla o maryuri sophie de oxígeno.  · Colocarlo para que se recueste boca abajo (decúbito prono). [place] facilita el ingreso de oxígeno a los pulmones.  · Uso continuo de maryuri máquina de presión positiva de las vías aéreas (CPAP) o de presión positiva de las vías aéreas de dos niveles (BPAP). Tracy tratamiento utiliza maryuri presión de aire leve para mantener las vías respiratorias abiertas. Un tubo conectado a un motor administra oxígeno al cuerpo.  · Respirador. Tracy tratamiento mueve el aire dentro y fuera de los pulmones mediante el uso de un tubo que se coloca en la tráquea.  · Traqueostomía. En tracy procedimiento se hace un orificio en el claudia para insertar un tubo de respiración.  · Oxigenación por membrana extracorpórea (OMEC). En tracy procedimiento, los pulmones tienen la posibilidad de recuperarse al asumir las funciones del corazón y los pulmones. Suministra oxígeno al cuerpo y elimina el dióxido de carbono.  Siga estas instrucciones en lopez casa:  Estilo de tati  · Si está enfermo, quédese en lopez casa, excepto para obtener atención médica. El médico le indicará cuánto tiempo debe quedarse en casa. Llame al médico antes de buscar atención médica.  · Karishma reposo en lopez casa joshua se lo haya indicado el médico.  · No consuma ningún producto que contenga nicotina o tabaco, joshua cigarrillos, cigarrillos electrónicos y tabaco de mascar. Si necesita ayuda para dejar de fumar, consulte al médico.  · Retome lowell actividades normales joshua se lo haya  indicado el médico. Pregúntele al médico qué actividades son seguras para usted.  Instrucciones generales  · Use los medicamentos de venta denzel y los recetados solamente joshau se lo haya indicado el médico.  · Ariana suficiente líquido joshua para mantener la orina de color amarillo pálido.  · Concurra a todas las visitas de seguimiento joshua se lo haya indicado el médico. [place] es importante.  ¿Cómo se david?    No hay ninguna vacuna que ayude a prevenir la infección por la COVID-19. Sin embargo, hay medidas que puede son para protegerse y proteger a otras personas de tracy virus.  Para protegerse:   · No viaje a zonas donde la COVID-19 sea un riesgo. Las zonas donde se informa la presencia de la COVID-19 cambian con frecuencia. Para identificar las zonas de alto riesgo y las restricciones de viaje, consulte el sitio web de viajes de los Centers for Disease Control and Prevention (CDC) (Centros para el Control y la Prevención de Enfermedades): wwwnc.cdc.gov/travel/notices  · Si vive o debe viajar a maryuri jamie donde COVID-19 es un riesgo, tome precauciones para evitar infecciones.  ? Aléjese de las personas enfermas.  ? Lávese las avani frecuentemente con agua y jabón roseanne 20 segundos. Use desinfectante para avani con alcohol si no dispone de agua y jabón.  ? Evite tocarse la boca, la danielle, los ojos o la nariz.  ? Evite salir de lopez casa, siga las indicaciones de lopez estado y de las autoridades sanitarias locales.  ? Si debe salir de lopez casa, use un barbijo de naty o maryuri mascarilla facial.  ? Desinfecte los objetos y las superficies que se tocan con frecuencia todos los días. Pueden incluir:  § Encimeras y mesas.  § Picaportes e interruptores de gunjan.  § Lavabos, fregaderos y grifos.  § Aparatos electrónicos tales joshua teléfonos, controles remotos, teclados, computadoras y tabletas.  Cómo proteger a los demás:  Si tiene síntomas de la COVID-19, tome medidas para evitar que el virus se propague a otras personas.  · Si tennille  que tiene maryuri infección por la COVID-19, comuníquese de inmediato con lopez médico. Informe al equipo de atención médica que tennille que puede tener maryuri infección por la COVID-19.  · Quédese en lopez casa. Salga de lopez casa solo para buscar atención médica. No utilice el transporte público.  · No viaje mientras esté enfermo.  · Lávese las avani frecuentemente con agua y jabón roseanne 20 segundos. Usar desinfectante para avani con alcohol si no dispone de agua y jabón.  · Manténgase alejado de quienes vivan con usted. Permita que los miembros de la christiano sanos cuiden a los niños y las mascotas, si es posible. Si tiene que cuidar a los niños o las mascotas, lávese las avani con frecuencia y use un barbijo. Si es posible, permanezca en lopez habitación, separado de los demás. Utilice un baño diferente.  · Asegúrese de que todas las personas que viven en lopez casa se laven paris las avani y con frecuencia.  · Tosa o estornude en un pañuelo de papel o sobre lopez manga o codo. No tosa o estornude al aire ni se cubra la boca o la nariz con la mano.  · Use un barbijo de nayt o maryuri mascarilla facial.  Dónde buscar más información  · Centers for Disease Control and Prevention (Centros para el Control y la Prevención de Enfermedades): www.cdc.gov/coronavirus/2019-ncov/index.html  · World Health Organization (Organización Mundial de la Rosa): www.who.int/health-topics/coronavirus  Comuníquese con un médico si:  · Vive o ha viajado a maryuri jamie donde la COVID-19 es un riesgo y tiene síntomas de infección.  · Ha tenido contacto con alguien que tiene COVID-19 y usted tiene síntomas de infección.  Solicite ayuda de inmediato si:  · Tiene dificultad para respirar.  · Siente dolor u opresión en el pecho.  · Experimenta confusión.  · Tiene las uñas de los dedos y los labios de color azulado.  · Tiene dificultad para despertarse.  · Los síntomas empeoran.  Estos síntomas pueden representar un problema grave que constituye maryuri emergencia. No espere a  harvey si los síntomas desaparecen. Solicite atención médica de inmediato. Comuníquese con el servicio de emergencias de lopez localidad (911 en los [place]). No conduzca por lowell propios medios hasta el hospital. Informe al personal médico de emergencias si tennille que tiene COVID-19.  Resumen  · La COVID-19 es maryuri infección respiratoria causada por un virus. También se conoce joshua enfermedad por coronavirus o nuevo coronavirus. Puede causar infecciones graves, joshua neumonía, síndrome de dificultad respiratoria aguda, insuficiencia respiratoria aguda o sepsis.  · El virus que causa la COVID-19 es contagioso. [place] significa que puede transmitirse de maryuri persona a otra a través de las gotitas de saliva de la tos y de los estornudos.  · Es más probable que desarrolle maryuri enfermedad grave si tiene 65 años o más, tiene un sistema inmunitario débil, vive en un hogar de ancianos o tiene enfermedad crónica.  · No hay ningún medicamento para tratar la COVID-19. El médico le informará sobre las maneras de tratar los síntomas.  · Edon medidas para protegerse y proteger a los demás contra las infecciones. Lávese las avani con frecuencia y desinfecte los objetos y las superficies que se tocan con frecuencia todos los días. Manténgase alejado de las personas que estén enfermas y use un barbijo si está enfermo.  Esta información no tiene joshua fin reemplazar el consejo del médico. Asegúrese de hacerle al médico cualquier pregunta que tenga.  Document Released: 02/15/2020 Document Revised: 05/19/2020 Document Reviewed: 02/15/2020  Elsevier Patient Education © 2020 Elsevier Inc.  COVID-19: Cómo protegerse y proteger a los demás  COVID-19: How to Protect Yourself and Others  Sepa cómo se propaga  · Actualmente, no existe ninguna vacuna para prevenir la enfermedad por coronavirus 2019 (COVID-19).  · La mejor forma de prevenir la enfermedad es evitar exponerse a tracy virus.  · Se tennille que el virus se transmite principalmente de maryuri  persona a otra.  ? Entre las personas que están en contacto directo entre sí (a maryuri distancia inferior a 6 pies [1.80 m]).  ? A través de las gotitas respiratorias producidas cuando maryuri persona infectada tose, estornuda o habla.  ? Estas gotitas pueden caer en la boca o en la nariz de las personas que están cerca o pueden ser inhaladas hacia los pulmones.  ? Algunos estudios recientes sugieren que la COVID-19 puede ser transmitida por personas que no presentan síntomas.  Lo que todos deben hacer  Límpiese las avani con frecuencia  · Lávese las avani con frecuencia con agua y jabón roseanne al menos 20 segundos, especialmente después de cuco estado en un lugar público o después de sonarse la nariz, toser o estornudar.  · Si no dispone de agua y jabón, use un desinfectante de avani que contenga al menos un 60 % de alcohol. Cubra todas las superficies de las avani y frótelas hasta que se sientan secas.  · No se toque los ojos, la nariz y la boca sin antes lavarse las avani.  Evite el contacto cercano  · Quédese en casa si está enfermo.  · Evite el contacto cercano con personas que estén enfermas.  · Establezca distancia entre usted y otras personas.  ? Recuerde que algunas personas que no tienen síntomas pueden transmitir el virus.  ? [place] es especialmente importante para las personas que tienen más riesgo de enfermarse.www.cdc.gov/coronavirus/2019-ncov/need-extra-precautions/people-at-higher-risk.html  Cúbrase la boca y la nariz con un barbijo de naty cuando esté cerca de otras personas  · Puede transmitir la COVID-19 a otras personas aunque no se sienta enfermo.  · Todas las personas deben usar un barbijo de naty cuando tengan que ir a un lugar público, por ejemplo, al supermercado o a buscar otros productos necesarios.  ? Los barbijos de naty no deben colocarse a niños menores de 2 años de edad, a las personas que tienen problemas respiratorios o que estén inconscientes, incapacitadas o que por algún motivo no  puedan quitarse la mascarilla sin ayuda.  · El propósito del barbijo de naty es proteger a otras personas en blanca de que usted esté infectado.  · NO utilice las mascarillas destinadas a los trabajadores de la jagdish.  · Continúe manteniendo maryuri distancia aproximada de 6 pies (1.80 m) entre usted y otras personas. El barbijo de naty no reemplaza el distanciamiento social.  Cúbrase al toser y estornudar  · Si está en un ambiente privado y no tiene el barbijo de naty, recuerde siempre cubrirse la boca y la nariz con un pañuelo descartable al toser o estornudar, o usar el pliegue del codo.  · Deseche los pañuelos descartables usados en la basura.  · Inmediatamente, lávese las avani con agua y jabón roseanne al menos 20 segundos. Si no dispone de agua y jabón, límpiese las avani con un desinfectante de avani que contenga al menos un 60 % de alcohol.  Limpie y desinfecte  · Limpie Y desinfecte las superficies que se tocan con frecuencia todos los días. [place] incluye mesas, picaportes, interruptores de gunjan, encimeras, mangos, escritorios, teléfonos, teclados, inodoros, grifos y lavabos. www.cdc.gov/coronavirus/2019-ncov/prevent-getting-sick/disinfecting-your-home.html  · Si las superficies están sucias, límpielas: Use detergente o jabón y agua antes de la desinfección.  · Luego, use un desinfectante doméstico. Puede consultar maryuri lista de los desinfectantes domésticos registrados en la Environmental Protection Agency (EPA) (Agencia de Protección Ambiental) aquí.  cdc.gov/coronavirus  05/05/2020  Esta información no tiene joshua fin reemplazar el consejo del médico. Asegúrese de hacerle al médico cualquier pregunta que tenga.  Document Released: 04/23/2020 Document Revised: 05/19/2020 Document Reviewed: 04/14/2020  Elsevier Patient Education © 2020 Elsevier Inc.  Preguntas frecuentes sobre el COVID-19  COVID-19 Frequently Asked Questions  El COVID-19 (enfermedad por coronavirus) es maryuri infección causada por maryuri gran christiano de  virus. Algunos virus causan enfermedades en las personas y otros causan enfermedades en animales tales joshua los camellos, los gatos y los murciélagos. En algunos casos, los virus que causan enfermedades en los animales pueden transmitirse a los seres humanos.  ¿De dónde provino el coronavirus?  En diciembre de 2019, [place] le informó a la Organización Mundial de la Rosa (OMS) acerca de varios casos de enfermedad pulmonar (enfermedad respiratoria humana). Estos casos estaban vinculados con un paz abierto de natasha de mar y ganado en la ciudad de [facility]. El vínculo con el paz de ganado y mariscos sugiere que el virus puede haberse propagado de los animales a los humanos. Sin embargo, desde bhavesh primer brote en diciembre, también se ha demostrado que el virus se contagia de maryuri persona a otra.  ¿Cuál es el nombre de la enfermedad y del virus?  Nombre de la enfermedad  Al principio, esta enfermedad se llamó nuevo coronavirus. [place] se debe a que los científicos determinaron que la enfermedad era causada por un nuevo virus respiratorio. La Organización Mundial de la Rosa (OMS) ahora ha dado a la enfermedad el nombre de COVID-19, o enfermedad por coronavirus.  Nombre del virus  El virus causante de la enfermedad se conoce joshua coronavirus de tipo 2 causante del síndrome respiratorio sulma grave (SARS-CoV-2).  Más información sobre el nombre de la enfermedad y el virus  Organización Mundial de la Rosa (OMS): www.who.int/emergencies/diseases/novel-coronavirus-2019/technical-guidance/naming-the-coronavirus-disease-(covid-2019)-and-the-virus-that-causes-it  ¿Quiénes están en riesgo de sufrir complicaciones debido a la enfermedad por coronavirus?  Algunas personas pueden tener un riesgo más alto de tener complicaciones debido a la enfermedad por coronavirus. Entre ellas se encuentran los adultos mayores y las personas que tienen enfermedades crónicas, joshua enfermedad cardíaca, diabetes y enfermedad pulmonar.  Si tiene  un riesgo más alto de tener complicaciones, tome estas precauciones adicionales:  · Evitar el contacto cercano con personas que estén enfermas o que tengan fiebre o tos. Permanecer al menos a maryuri distancia de 3 a 6 pies (1-2 m) de las otras personas, si es posible.  · Lavarse las avani regularmente con agua y jabón roseanne al menos 20 segundos.  · Evitar tocarse la danielle, la boca, la nariz y los ojos.  · Tener a mano los suministros en lopez casa, joshua alimentos, medicamentos y productos de limpieza.  · Permanecer en lopez casa todo lo que sea posible.  · Evitar las reuniones sociales y los viajes.  ¿Cómo se transmite la enfermedad causada por el coronavirus?  El virus que causa la enfermedad por coronavirus se transmite fácilmente de maryuri persona a otra (es contagioso). También hay casos de enfermedad de transmisión comunitaria. [place] significa que la enfermedad se ha propagado a:  · Personas que no tienen contacto conocido con otras personas infectadas.  · Personas que no harp viajado a zonas donde hay casos conocidos.  Aparentemente, se transmite de maryuri persona a otra a través de las gotitas que se despiden al toser o al estornudar.  ¿Puedo contraer al virus al tocar superficies u objetos?  Todavía hay mucho que no se conoce acerca del virus que causa la enfermedad por coronavirus. Los científicos basan gran parte de la información en lo que saben sobre virus similares, por ejemplo:  · En general, los virus no sobreviven en superficies roseanne mucho tiempo. Necesitan un cuerpo humano (huésped) para sobrevivir.  · Es más probable que el virus se contagie por contacto cercano con personas que están enfermas (contacto directo), por ejemplo:  ? Al estrechar las avani o abrazarse.  ? Al inhalar las gotitas respiratorias que se desplazan por el aire. [place] puede ocurrir cuando maryuri persona infectada tose o estornuda sobre o cerca de otras personas.  · Es menos probable que el virus se propague cuando maryuri persona toca maryuri  superficie o un objeto sobre el que está el virus (contacto indirecto). El virus puede ingresar al cuerpo si la persona toca maryuri superficie o un objeto y luego se toca la danielle, los ojos, la nariz o la boca.  ¿Maryuri persona puede contagiar el virus sin tener síntomas de la enfermedad?  Puede ser posible que el virus se contagie antes de que la persona tenga síntomas de la enfermedad, viky muy probablemente esta no sea la principal forma en que el virus se está propagando. Es más probable que el virus se propague al estar en contacto directo con personas que están enfermas e inhalar las gotas respiratorias que maryuri persona enferma despide al toser o estornudar.  ¿Cuáles son los síntomas de la enfermedad causada por el coronavirus?  Los síntomas varían de maryuri persona a otra y pueden variar de leves a graves. Entre los síntomas, se pueden incluir los siguientes:  · Fiebre.  · Tos.  · Cansancio, debilidad o fatiga.  · Respiración rápida o sensación de falta el aire.  Estos síntomas pueden aparecer en el término de 2 a 14 días después de cuco estado expuesto al virus. Si presenta síntomas, llame al médico. Las personas con síntomas graves pueden necesitar atención hospitalaria.  Si estoy expuesto al virus, ¿cuánto tiempo tardan en aparecer los síntomas?  Los síntomas de la enfermedad por coronavirus pueden aparecer en cualquier momento en el término de 2 a 14 días después de que maruyri persona haya estado expuesta al virus. Si presenta síntomas, llame al médico.  ¿Nazia hacerme un análisis de detección del virus?  El médico decidirá si debe realizarse un análisis en función de [place] síntomas, antecedentes de exposición y factores de riesgo.  ¿Cómo realiza el médico el análisis para detectar tracy virus?  Los médicos obtienen muestras para enviar a analizar. Estas muestras pueden incluir lo siguiente:  · Lonny con un hisopo líquido de la nariz.  · Pedirle que tosa mucosidad (esputo) para extraer líquido de los pulmones en un  recipiente estéril.  · Lonny maryuri muestra de elizabeth.  · Lonny maryuri muestra de heces u orina.  ¿Hay algún tratamiento o vacuna para tracy virus?  Actualmente, no existe ninguna vacuna para prevenir la enfermedad por coronavirus. Además, no existen medicamentos joshua los antibióticos o los antivirales para tratar el virus. Maryuri persona que se enferma recibe tratamiento de apoyo, lo que significa reposo y líquidos. Maryuri persona también puede aliviar [place]ell síntomas con medicamentos de venta denzel para tratar los estornudos, la tos y el goteo nasal. Son los mismos medicamentos que se bibi para el resfrío común.  Si presenta síntomas, llame al médico. Las personas con síntomas graves pueden necesitar atención hospitalaria.  ¿Qué puedo hacer para protegerme y proteger a mi christiano de tracy virus?         Puede protegerse y proteger a lopez christiano tomando las mismas medidas que tomaría para prevenir el contagio de otros virus. [place] las siguientes medidas:  · Lavarse las avani regularmente con agua y jabón roseanne al menos 20 segundos. Usar desinfectante para avani con alcohol si no dispone de agua y jabón.  · Evitar tocarse la danielle, la boca, la nariz y los ojos.  · Toser o estornudar en un pañuelo descartable, sobre lopez manga o codo. No toser o estornudar al aire ni cubrirse con la mano.  ? Si tose o estornuda en un pañuelo de papel, deséchelo inmediatamente y lávese las avani.  · Desinfectar los objetos y las superficies que se tocan con frecuencia todos los días.  · Evitar el contacto cercano con personas que estén enfermas o que tengan fiebre o tos. Permanecer al menos a maryuri distancia de 3 a 6 pies (1-2 m) de las otras personas, si es posible.  · Quedarse en lopez casa si está enfermo, excepto para obtener atención médica. Llame al médico antes de buscar atención médica.  · Asegúrese de tener las vacunas al día. Pregúntele al médico qué vacunas necesita.  ¿Qué jourdan hacer si tengo que viajar?  Siga las recomendaciones relacionadas con  los viajes de la autoridad de jagd[place] local, los CDC y la OMS.  Información y consejos para viajeros  · Centers for Disease Control and Prevention (CDC) (Centros para el Control y la Prevención de Enfermedades): www.cdc.gov/coronavirus/2019-ncov/travelers/index.html  · Organización Mundial de la Jagdish (OMS): www.who.int/emergencies/diseases/novel-coronavirus-2019/travel-advice  Conozca los riesgos y tome medidas para proteger lopez jagdish  · El riesgo de contraer la enfermedad por coronavirus es más alto si viaja a zonas con un brote o si está en contacto con viajeros que provienen de zonas donde hay un brote.  · Lávese las avani con frecuencia y mantenga maryuri higiene adecuada para reducir el riesgo de contagiarse o transmitir el virus.  ¿Qué jourdan hacer si estoy enfermo?  Instrucciones generales para detener la propagación de la infección  · Lavarse las avani regularmente con agua y jabón roseanne al menos 20 segundos. Usar desinfectante para avani con alcohol si no dispone de agua y jabón.  · Toser o estornudar en un pañuelo descartable, sobre lopez manga o codo. No toser o estornudar al aire ni cubrirse con la mano.  · Si tose o estornuda en un pañuelo de papel, deséchelo inmediatamente y lávese las avani.  · Quédese en lopez casa a menos que deba recibir atención médica. Llame al médico o a la autoridad de jagdish local antes de buscar atención médica.  · Evite las zonas públicas. No viaje en transporte público, de ser posible.  · Si puede, use un barbijo si debe salir de la casa o si está en contacto cercano con alguien que no está enfermo.  Mantenga lopez casa limpia  · Desinfecte los objetos y las superficies que se tocan con frecuencia todos los días. Pueden incluir:  ? Encimeras y mesas.  ? Picaportes e interruptores de gunjan.  ? Lavabos, fregaderos y grifos.  ? Aparatos electrónicos tales joshua teléfonos, controles remotos, teclados, computadoras y tabletas.  · Lave los platos con agua jabonosa caliente o en el lavavajillas.  Deje los platos para que se sequen al aire.  · Lave la ropa con [place].  Evite infectar a otros miembros de la christiano  · Permita que los miembros de la christiano sanos cuiden a los niños y las mascotas, si es posible. Si tiene que cuidar a los niños o las mascotas, lávese las avani con frecuencia y use un barbijo.  · Duerma en maryuri habitación o cama diferentes, si es posible.  · No comparta elementos personales, joshua afeitadoras, cepillos de dientes, desodorantes, peines, cepillos, toallas y toallitas de mano.  Dónde buscar más información  Centers for Disease Control and Prevention (CDC)  · Actualizaciones de información y novedades: www.cdc.gov/coronavirus/2019-ncov  Organización Mundial de la Jagdish (OMS)  · Actualizaciones de información y novedades: www.who.int/emergencies/diseases/novel-coronavirus-2019  · Thelma de jagdish relacionado con el coronavirus: www.who.int/health-topics/coronavirus  · Preguntas y respuestas sobre COVID-19: www.who.int/news-room/q-a-detail/z-e-ebyhrtjuptwho  · Registro mundial: who.sprinklr.com  American Academy of Pediatrics (AAP) (Academia Estadounidense de Pediatría)  · Información para familias: www.healthychildren.org/English/health-issues/conditions/chest-lungs/Pages/2019-Novel-Coronavirus.aspx  La situación del coronavirus cambia rápidamente. Consulte el sitio web de lopez autoridad de jagdish local o los sitios web de los CDC y la OMS para enterarse de las novedades y noticias.  ¿Cuándo jourdan comunicarme con un médico?  · Comuníquese con lopez médico si tiene síntomas de infección, joshua fiebre o tos, y:  ? Ha estado cerca de alguien que sabe que tiene la enfermedad por coronavirus.  ? Ha estado en contacto con maryuri persona que presuntamente sufra de la enfermedad por coronavirus.  ? Ha viajado fuera del país.  ¿Cuándo jourdan buscar asistencia médica inmediata?  · Busque ayuda de inmediato llamando al servicio de emergencias de lopez localidad (911 en los [place]) si tiene lo  siguiente:  ? Dificultad para respirar.  ? Dolor u opresión en el pecho.  ? Confusión.  ? Labios y uñas de color azulado.  ? Dificultad para despertarse.  ? Síntomas que empeoran.  Informe al personal médico de emergencias si tennille que tiene la enfermedad por coronavirus.  Resumen  · Un nuevo virus respiratorio se propaga de maryuri persona a otra y causa COVID-19 (enfermedad por coronavirus).  · El virus que causa el COVID-19 parece diseminarse fácilmente. Se transmite de maryuri persona a otra a través de las gotitas que se despiden al toser o al estornudar.  · Los adultos mayores y las personas que tienen enfermedades crónicas tienen mayor riesgo de contraer la enfermedad. Si tiene un riesgo más alto de tener complicaciones, tome precauciones adicionales.  · Actualmente, no existe ninguna vacuna para prevenir la enfermedad por coronavirus. No existen medicamentos, joshua los antibióticos o los antivirales, para tratar el virus.  · Puede protegerse y proteger a lopez christiano al lavarse las avani con frecuencia, evitar tocarse la danielle y cubrirse al toser y estornudar.  Esta información no tiene joshua fin reemplazar el consejo del médico. Asegúrese de hacerle al médico cualquier pregunta que tenga.  Document Released: 04/27/2020 Document Revised: 04/27/2020 Document Reviewed: 04/27/2020  Elsevier Patient Education © 2020 [employer]  Discharge Instructions    Discharged to home by car with relative. Discharged via wheelchair, hospital escort: Yes.  Special equipment needed: Oxygen    Be sure to schedule a follow-up appointment with your primary care doctor or any specialists as instructed.     Discharge Plan:   Diet Plan: Discussed  Activity Level: Discussed  Confirmed Follow up Appointment: Patient to Call and Schedule Appointment  Confirmed Symptoms Management: Discussed  Medication Reconciliation Updated: Yes  Influenza Vaccine Indication: Patient Refuses    I understand that a diet low in cholesterol, fat, and sodium is  recommended for good health. Unless I have been given specific instructions below for another diet, I accept this instruction as my diet prescription.   Other diet: Heart Healthy    Special Instructions:  Please call to set up a follow up appointment with your doctor within 1 week.     · Is patient discharged on Warfarin / Coumadin?   No     Depression / Suicide Risk    As you are discharged from this [facility] facility, it is important to learn how to keep safe from harming yourself.    Recognize the warning signs:  · Abrupt changes in personality, positive or negative- including increase in energy   · Giving away possessions  · Change in eating patterns- significant weight changes-  positive or negative  · Change in sleeping patterns- unable to sleep or sleeping all the time   · Unwillingness or inability to communicate  · Depression  · Unusual sadness, discouragement and loneliness  · Talk of wanting to die  · Neglect of personal appearance   · Rebelliousness- reckless behavior  · Withdrawal from people/activities they love  · Confusion- inability to concentrate     If you or a loved one observes any of these behaviors or has concerns about self-harm, here's what you can do:  · Talk about it- your feelings and reasons for harming yourself  · Remove any means that you might use to hurt yourself (examples: pills, rope, extension cords, firearm)  · Get professional help from the community (Mental Health, Substance Abuse, psychological counseling)  · Do not be alone:Call your Safe Contact- someone whom you trust who will be there for you.  · Call your local CRISIS HOTLINE 745-0787 or 114-866-5761  · Call your local Children's Mobile Crisis Response Team [place] (754) 721-2924 or www.Recombine  · Call the toll free National Suicide Prevention Hotlines   · National Suicide Prevention Lifeline 058-970-FXHB (8650)  · National Hope Line Network 800-SUICIDE (265-6017)

## 2021-01-09 NOTE — PROGRESS NOTES
Discharge planning noted at 1400 but patient is still here, spoke with AM RN Ethan states that Dr. Gutierrez wanted the patient to stay another night although this is not charted. Spoke with patient states he has no ride home until the morning, home O2 is at bedside. Updated patient on POC will update AM team.

## 2021-01-09 NOTE — RESPIRATORY CARE
REMOTE MONITORING PROGRAM by RESPIRATORY THERAPY  1/9/2021 at 10:13 AM by ROS Bruce     Patient interviewed by RT. Patient does not meet requirements for Remote Monitoring program. Patient doesn't own a smart phone.

## 2021-01-10 ENCOUNTER — TELEMEDICINE (OUTPATIENT)
Dept: URGENT CARE | Facility: CLINIC | Age: 56
End: 2021-01-10
Payer: OTHER GOVERNMENT

## 2021-01-30 NOTE — DOCUMENTATION QUERY
[facility]                                                                       Query Response Note      PATIENT:               WARD EUBANKS  ACCT #:                  [id number]  MRN:                     [id number]  :                      1965  ADMIT DATE:       2020 3:25 PM  DISCH DATE:        2021 12:22 PM  RESPONDING  PROVIDER #:        [id number]           QUERY TEXT:    This patient presented with COVID-19 PNA with acute respiratory failure requiring a nonrebreather at 15L of oxygen.  It is later documented the patient has COVID-19 PNA with ARDS with no change to oxygenation requirements.   With the current provider documentation it is unclear to the  whether ARDS was present on admission.    Your help is needed to clarify the POA status of ARDS.    Daina Mcclelland CCS  Coding   [email or web address]      The patient's Clinical Indicators include:  PER H&P:  --* Pneumonia due to COVID-19 virus  Assessment & Plan  Admit to telemetry on high flow nasal cannula   Attempt to keep patient net negative to reduce risk for ards  --Acute hypoxemic respiratory failure (HCC)    per  CONSULT NOTE:  Acute hypoxemic respiratory failure (HCC)- (present on admission)  Assessment & Plan  Secondary to COVID-19 ARDS  HFNC  RT/O2 Protocols    PER D/C SUMMARY:  DISCHARGE DIAGNOSES  Principal Problem:    Acute hypoxemic respiratory failure (HCC) POA: Yes  Active Problems:    Pneumonia due to COVID-19 virus POA: Yes     PN:  Patient does have superimposed bacterial pneumonia, will continue Unasyn plus azithromycin     PN:  Aspiration pneumonia (HCC)  Assessment & Plan  Found to have left sided infiltrate    TREATMENT:  15L nonrebreather in started ED; Remdesivir for 5 days IV; dexamethasone 6 mg PO daily; Zosyn for 5 days as coverage for left-sided pneumonia    RELATED CONDITIONS:  COVID-19 PNA (POA) with  superimposed bacterial pneumonia (12/9) and pneumonitis (12/24); DM; patient requiring supplemental home oxygen at time of discharge  Options provided:   -- ARDS was present on admission   -- ARDS was not present on admission   -- Unable to determine      Query created by: Daina Mcclelland on 1/15/2021 7:23 AM    RESPONSE TEXT:    ARDS was not present on admission          Electronically signed by:  LOUIS HART MD 1/30/2021 10:57 AM

## 2022-05-24 NOTE — PROGRESS NOTES
1. Keep all weightbearing to a minimum, with CAM boot on with any weightbearing  2. Perform range of motion exercises at least twice daily  3. Apply betadine paint to incision once daily, gauze, and ACE wrap overlying   4. Call with any questions or concerns  5. Follow up in two weeks, sooner as needed   Hospital Medicine Daily Progress Note    Date of Service  12/18/2020    Chief Complaint  Short of breath with diarrhea, vomiting x 2 weeks.    Hospital Course  French speaking 55 y.o. male with DMII, neuropathy, admitted 12/8/2020 with COVID-19 infection requiring ICU hospitalization due to O2 demand.    Interval Problem Update  12/15: On high flow NC (60L at 100%). On day 7 of 10 Decadron.  Tolerating diet.     12/16: Alert but ongoing respiratory distress.  Acute worsening this am with sinus tachycardia on telemetry and worsening oxygen saturation.  An ABG was requested and showed pH:7.46/pCO2:34.5/pO2:48.  I alerted Dr Mayo for possible need of bipap or intubation.    12/17: Still tenuous from a respiratory standpoint.  He is anxious at times and a bit scared.  Used  via iPad to communicate with him this morning.  He remains on 90% FiO2 with 60 L O2.    12/18: On 60L 100% and rapid shallow breathing.  Tearful at times.  Denied pain.  Able to talk in short sentences.  Discussed with Dr Mayo     Consultants/Specialty  Pulmonary    Code Status  Full Code    Disposition  Transfer to [facility] floor.    Review of Systems  Review of Systems   Unable to perform ROS: Severe respiratory distress        Physical Exam  Temp:  [36.6 °C (97.8 °F)-37.2 °C (99 °F)] 36.6 °C (97.8 °F)  Pulse:  [] 107  Resp:  [15-40] 28  BP: ()/(58-88) 110/73  SpO2:  [81 %-95 %] 90 %    Physical Exam  Vitals signs reviewed.   Constitutional:       General: He is in acute distress.      Appearance: He is ill-appearing. He is not diaphoretic.   HENT:      Head: Normocephalic and atraumatic.      Nose: Nose normal.      Mouth/Throat:      Mouth: Mucous membranes are moist.      Pharynx: No oropharyngeal exudate.   Eyes:      General:         Right eye: No discharge.         Left eye: No discharge.      Extraocular Movements: Extraocular movements intact.      Conjunctiva/sclera:      Right eye: Hemorrhage present.      Left  eye: Hemorrhage present.   Neck:      Musculoskeletal: No neck rigidity.   Cardiovascular:      Rate and Rhythm: Regular rhythm. Tachycardia present.      Pulses:           Radial pulses are 2+ on the right side and 2+ on the left side.        Dorsalis pedis pulses are 2+ on the right side and 2+ on the left side.      Heart sounds: No murmur.   Pulmonary:      Effort: Tachypnea and respiratory distress present.      Breath sounds: Rhonchi present. No wheezing.   Abdominal:      General: Bowel sounds are normal. There is no distension.      Palpations: Abdomen is soft.      Tenderness: There is no abdominal tenderness.   Musculoskeletal:         General: No swelling or tenderness.      Right lower leg: No edema.      Left lower leg: No edema.   Skin:     Coloration: Skin is not jaundiced.      Findings: No bruising.   Neurological:      General: No focal deficit present.      Mental Status: He is alert and oriented to person, place, and time. Mental status is at baseline.      Cranial Nerves: No cranial nerve deficit.   Psychiatric:         Mood and Affect: Mood normal.         Fluids    Intake/Output Summary (Last 24 hours) at 12/18/2020 1613  Last data filed at 12/18/2020 1400  Gross per 24 hour   Intake 340 ml   Output 1220 ml   Net -880 ml       Laboratory  Recent Labs     12/16/20  0538 12/17/20  0430 12/18/20  0245   WBC 20.5* 17.6* 22.5*   RBC 5.05 4.84 4.88   HEMOGLOBIN 15.2 14.6 14.7   HEMATOCRIT 45.7 43.5 45.0   MCV 90.5 89.9 92.2   MCH 30.1 30.2 30.1   MCHC 33.3* 33.6* 32.7*   RDW 43.8 43.3 43.8   PLATELETCT 347 307 310   MPV 9.5 9.2 9.3     Recent Labs     12/16/20  1000 12/17/20  0430 12/18/20  0245   SODIUM 133* 137 135   POTASSIUM 3.8 3.9 4.0   CHLORIDE 93* 98 97   CO2 25 27 26   GLUCOSE 290* 194* 199*   BUN 28* 30* 36*   CREATININE 0.79 0.80 0.79   CALCIUM 8.3* 8.7 8.9                   Imaging  US-EXTREMITY VENOUS LOWER BILAT   Final Result      DX-CHEST-PORTABLE (1 VIEW)   Final Result      No  significant change in Covid pneumonia.      US-EXTREMITY VENOUS LOWER BILAT   Final Result      EC-ECHOCARDIOGRAM LTD W/O CONT   Final Result      DX-CHEST-LIMITED (1 VIEW)   Final Result      No significant change in bilateral pneumonia.      DX-CHEST-LIMITED (1 VIEW)   Final Result         Persistent bilateral pulmonary opacities consistent with Covid 19 pneumonia.      DX-CHEST-PORTABLE (1 VIEW)   Final Result      Bilateral peripheral pulmonary airspace process highly suspicious for Covid pneumonia           Assessment/Plan  * Pneumonia due to COVID-19 virus  Assessment & Plan  Tested COVID-19 positive on 12/8  Patient is on high flow,  Decadron 6 mg p.o. daily until 12/17  S/p 5 days course of remdesivir with last dose 12/13.  continue incentive spirometer, provide Zn, vitamin D and ascorbic acid  Encourage proning and mobilization  Patient was treated with empiric IV antibiotics.  WBC and procalcitonin have been downtrending  Ultrasound negative for DVT in bilateral lower extremities, echocardiogram reveals normal LVEF    Elevated d-dimer- (present on admission)  Assessment & Plan  LE US negative for DVT  Echo from 12/11 reviewed with no right heart failure.    Leukocytosis  Assessment & Plan  12/17 mild decrease in WBC  monitor vitals.  Continue to monitor    Abnormal LFTs  Assessment & Plan  Suspect due to covid 19   Monitoring CMP (last checked 12/13)  12/17 LFTs showing improvement.  Likely viral etiology    Sepsis (HCC)  Assessment & Plan  This is Severe Sepsis Present on admission  SIRS criteria identified on my evaluation include: Tachycardia, with heart rate greater than 90 BPM, Tachypnea, with respirations greater than 20 per minute and Leukocytosis, with WBC greater than 12,000  Source of infection is pulmonary  Clinical indicators of end organ dysfunction include Lactate >2 mmol/L (18.0 mg/dL)  Sepsis protocol initiated  Fluid resuscitation ordered per protocol  IV antibiotics as appropriate for  source of sepsis  Reassessment: I have reassessed the patient's hemodynamic status  End organ dysfunction include(s):  Acute respiratory failure           Acute hypoxemic respiratory failure (HCC)- (present on admission)  Assessment & Plan  Likely  secondary to Covid pneumonia superimposed to the patient with pneumonia  Status post course of 10 days Decadron  12/18 WBC:22.5  Encourage incentive spirometer, self proning, titrate oxygen as needed  Currently on high flow nasal canula 90% FiO2 at 60 L  COVID + as of 12/8    Neuropathic pain- (present on admission)  Assessment & Plan  gabapentin       VTE prophylaxis: Enoxaparin 40mg daily